# Patient Record
Sex: MALE | Race: WHITE | NOT HISPANIC OR LATINO | Employment: FULL TIME | URBAN - METROPOLITAN AREA
[De-identification: names, ages, dates, MRNs, and addresses within clinical notes are randomized per-mention and may not be internally consistent; named-entity substitution may affect disease eponyms.]

---

## 2017-06-15 ENCOUNTER — GENERIC CONVERSION - ENCOUNTER (OUTPATIENT)
Dept: OTHER | Facility: OTHER | Age: 57
End: 2017-06-15

## 2017-07-21 ENCOUNTER — TRANSCRIBE ORDERS (OUTPATIENT)
Dept: LAB | Facility: CLINIC | Age: 57
End: 2017-07-21

## 2017-07-21 ENCOUNTER — APPOINTMENT (OUTPATIENT)
Dept: LAB | Facility: CLINIC | Age: 57
End: 2017-07-21
Payer: COMMERCIAL

## 2017-07-21 ENCOUNTER — GENERIC CONVERSION - ENCOUNTER (OUTPATIENT)
Dept: OTHER | Facility: OTHER | Age: 57
End: 2017-07-21

## 2017-07-21 ENCOUNTER — ALLSCRIPTS OFFICE VISIT (OUTPATIENT)
Dept: OTHER | Facility: OTHER | Age: 57
End: 2017-07-21

## 2017-07-21 DIAGNOSIS — E66.01 MORBID (SEVERE) OBESITY DUE TO EXCESS CALORIES (HCC): ICD-10-CM

## 2017-07-21 DIAGNOSIS — I10 ESSENTIAL (PRIMARY) HYPERTENSION: ICD-10-CM

## 2017-07-21 DIAGNOSIS — R73.9 HYPERGLYCEMIA: ICD-10-CM

## 2017-07-21 LAB
ALBUMIN SERPL BCP-MCNC: 3.7 G/DL (ref 3.5–5)
ALP SERPL-CCNC: 67 U/L (ref 46–116)
ALT SERPL W P-5'-P-CCNC: 29 U/L (ref 12–78)
ANION GAP SERPL CALCULATED.3IONS-SCNC: 4 MMOL/L (ref 4–13)
AST SERPL W P-5'-P-CCNC: 12 U/L (ref 5–45)
BASOPHILS # BLD AUTO: 0.02 THOUSANDS/ΜL (ref 0–0.1)
BASOPHILS NFR BLD AUTO: 0 % (ref 0–1)
BILIRUB SERPL-MCNC: 1.32 MG/DL (ref 0.2–1)
BUN SERPL-MCNC: 15 MG/DL (ref 5–25)
CALCIUM SERPL-MCNC: 9.4 MG/DL (ref 8.3–10.1)
CHLORIDE SERPL-SCNC: 100 MMOL/L (ref 100–108)
CHOLEST SERPL-MCNC: 215 MG/DL (ref 50–200)
CO2 SERPL-SCNC: 34 MMOL/L (ref 21–32)
CREAT SERPL-MCNC: 0.93 MG/DL (ref 0.6–1.3)
EOSINOPHIL # BLD AUTO: 0.35 THOUSAND/ΜL (ref 0–0.61)
EOSINOPHIL NFR BLD AUTO: 4 % (ref 0–6)
ERYTHROCYTE [DISTWIDTH] IN BLOOD BY AUTOMATED COUNT: 13.2 % (ref 11.6–15.1)
EST. AVERAGE GLUCOSE BLD GHB EST-MCNC: 160 MG/DL
GFR SERPL CREATININE-BSD FRML MDRD: >60 ML/MIN/1.73SQ M
GLUCOSE P FAST SERPL-MCNC: 160 MG/DL (ref 65–99)
HBA1C MFR BLD: 7.2 % (ref 4.2–6.3)
HCT VFR BLD AUTO: 45.7 % (ref 36.5–49.3)
HDLC SERPL-MCNC: 31 MG/DL (ref 40–60)
HGB BLD-MCNC: 15.5 G/DL (ref 12–17)
LDLC SERPL CALC-MCNC: 138 MG/DL (ref 0–100)
LYMPHOCYTES # BLD AUTO: 2.02 THOUSANDS/ΜL (ref 0.6–4.47)
LYMPHOCYTES NFR BLD AUTO: 23 % (ref 14–44)
MCH RBC QN AUTO: 30.5 PG (ref 26.8–34.3)
MCHC RBC AUTO-ENTMCNC: 33.9 G/DL (ref 31.4–37.4)
MCV RBC AUTO: 90 FL (ref 82–98)
MONOCYTES # BLD AUTO: 0.39 THOUSAND/ΜL (ref 0.17–1.22)
MONOCYTES NFR BLD AUTO: 5 % (ref 4–12)
NEUTROPHILS # BLD AUTO: 5.92 THOUSANDS/ΜL (ref 1.85–7.62)
NEUTS SEG NFR BLD AUTO: 68 % (ref 43–75)
NRBC BLD AUTO-RTO: 0 /100 WBCS
PLATELET # BLD AUTO: 199 THOUSANDS/UL (ref 149–390)
PMV BLD AUTO: 11.8 FL (ref 8.9–12.7)
POTASSIUM SERPL-SCNC: 4.8 MMOL/L (ref 3.5–5.3)
PROT SERPL-MCNC: 7.3 G/DL (ref 6.4–8.2)
RBC # BLD AUTO: 5.09 MILLION/UL (ref 3.88–5.62)
SODIUM SERPL-SCNC: 138 MMOL/L (ref 136–145)
TRIGL SERPL-MCNC: 230 MG/DL
TSH SERPL DL<=0.05 MIU/L-ACNC: 1.81 UIU/ML (ref 0.36–3.74)
WBC # BLD AUTO: 8.75 THOUSAND/UL (ref 4.31–10.16)

## 2017-07-21 PROCEDURE — 84443 ASSAY THYROID STIM HORMONE: CPT

## 2017-07-21 PROCEDURE — 85025 COMPLETE CBC W/AUTO DIFF WBC: CPT

## 2017-07-21 PROCEDURE — 80061 LIPID PANEL: CPT

## 2017-07-21 PROCEDURE — 36415 COLL VENOUS BLD VENIPUNCTURE: CPT

## 2017-07-21 PROCEDURE — 80053 COMPREHEN METABOLIC PANEL: CPT

## 2017-07-21 PROCEDURE — 83036 HEMOGLOBIN GLYCOSYLATED A1C: CPT

## 2017-11-29 ENCOUNTER — ALLSCRIPTS OFFICE VISIT (OUTPATIENT)
Dept: OTHER | Facility: OTHER | Age: 57
End: 2017-11-29

## 2017-12-06 ENCOUNTER — ALLSCRIPTS OFFICE VISIT (OUTPATIENT)
Dept: OTHER | Facility: OTHER | Age: 57
End: 2017-12-06

## 2017-12-06 DIAGNOSIS — I10 ESSENTIAL (PRIMARY) HYPERTENSION: ICD-10-CM

## 2017-12-06 DIAGNOSIS — R73.9 HYPERGLYCEMIA: ICD-10-CM

## 2017-12-07 NOTE — PROGRESS NOTES
Assessment    1  Current every day smoker (305 1) (F17 200)   · FEW CIG /DAILY   2  Hypertension (401 9) (I10)   · 10/05   3  Obesity, morbid, BMI 40 0-49 9 (278 01) (E66 01)   4  Blood glucose elevated (790 29) (R73 9)   5  Dry skin (701 1) (L85 3)   6  Abnormal LFTs (790 6) (R79 89)    Plan  Blood glucose elevated    · (1) COMPREHENSIVE METABOLIC PANEL; Status:Active; Requested for:01Gwp4509;    · (1) HEMOGLOBIN A1C; Status:Active; Requested for:97Rcb6489;   Dry skin    · Ammonium Lactate 12 % External Cream; APPLY  AND RUB  IN A THIN FILM TOAFFECTED AREAS TWICE DAILY  (AM AND PM)  Flu vaccine need    · Stop: Fluzone Quadrivalent 0 5 ML Intramuscular Suspension PrefilledSyringe  Hypertension    · (1) LIPID PANEL, FASTING; Status:Active; Requested for:83Kwf3882;    · (1) TSH WITH FT4 REFLEX; Status:Active; Requested for:41Ydf9193;   SocHx: Current every day smoker    · You need to quit smoking ; Status:Complete;   Done: 43SDB5522    Discussion/Summary    Patient presents today for follow-up for several issues  Hypertension is much improved  He also has no further lower extremity swelling  He has done an excellent job and continues to lose weight  I would like her to have some labs redone to check up on his history of elevated cholesterol as well as elevated glucose  and anxiety have improved significantly  Continue with Lexapro daily and follow up with his   he does have 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  I prescribed him some Lac-Hydrin to apply as needed to his arms  He does continue to smoke a few cigarettes daily and I encouraged him to quit smoking as soon as possible  Chief Complaint  2M Follow up - HTNFlu shot      History of Present Illness  Patient presents today for follow-up for his chronic health issues  He has done quite well since his last visit  Depression anxiety much improved Lexapro   He does continue to speak routinely with his  which has been quite helpful as well  He denies any suicidal or homicidal ideation  has history of elevated glucose and is morbidly obese  He denies polyuria or polydipsia  his lower extremity swelling has improved with significant weight loss  He denies any current problems chest pain, shortness of breath or palpitations  Hypertension has been well controlled have dryness on bilateral arms  He notes that he has to utilize some very strong agents to get oil off of his arms from work sometimes which he thinks cause the problem  Review of Systems   Constitutional: recent weight loss, but-- no fever,-- not feeling poorly,-- no recent weight gain-- and-- not feeling tired  Cardiovascular: no chest pain-- and-- no palpitations  Respiratory: no shortness of breath,-- no cough,-- no wheezing-- and-- no shortness of breath during exertion  Gastrointestinal: no abdominal pain-- and-- no constipation  Genitourinary: no dysuria  Musculoskeletal: no arthralgias-- and-- no myalgias  Integumentary: dry skin, but-- no rashes--   The patient presents with complaints of no skin lesions  Neurological: no headache  Psychiatric: anxiety,-- depression-- and-- much improved, but-- not suicidal-- and-- no sleep disturbances  Endocrine: no muscle weakness  Hematologic/Lymphatic: no tendency for easy bleeding  Active Problems  1  Abnormal LFTs (790 6) (R79 89)   2  Basal cell carcinoma of nose (173 31) (C44 311)   3  Blood glucose elevated (790 29) (R73 9)   4  Carpal tunnel syndrome of right wrist (354 0) (G56 01)   5  Depression with anxiety (300 4) (F41 8)   6  Flu vaccine need (V04 81) (Z23)   7  Hypertension (401 9) (I10)   8  Obesity, morbid, BMI 40 0-49 9 (278 01) (E66 01)   9  Special screening examination for neoplasm of prostate (V76 44) (Z12 5)   10  Vasovagal syncope (780 2) (R55)    Past Medical History  1  History of Anxiety (300 00) (F41 9)   2  History of Hemangioma of liver (228 04) (D18 03)   3   History of depression (V11 8) (Z86 59) 4  History of hyperlipidemia (V12 29) (Z86 39)   5  History of Polyp of sigmoid colon (211 3) (D12 5)    Surgical History    1  History of Cholecystectomy Laparoscopic   2  History of Hernia Repair   3  History of Transcatheter Embolization For Tumor Destruction    Family History  Mother    1  Family history of Depression  Maternal Uncle    2  Family history of Hodgkin Disease    Social History     · Current every day smoker (305 1) (F17 200)   · Never Drank Alcohol    Current Meds   1  Escitalopram Oxalate 20 MG Oral Tablet; TAKE 1 TABLET DAILY; Therapy: 22FKH9289 to  Requested for: 03TPX4757 Recorded   2  HydroCHLOROthiazide 25 MG Oral Tablet; take 1 tablet by mouth every day; Therapy: 99PXR1904 to (Evaluate:28Jun2018)  Requested for: 44FPG5570; Last Rx:98Goz0338 Ordered   3  LORazepam 0 5 MG Oral Tablet; Take 1 tablet 3 times daily as needed; Therapy: 71OSX2493 to (Evaluate:10Aug2017); Last Rx:00Xqh5325 Ordered    Allergies  1  No Known Drug Allergies    Vitals  Vital Signs    Recorded: 18OTY7824 11:44AM   Heart Rate 68   Respiration 18   Systolic 935   Diastolic 78   Height 6 ft 2 in   Weight 348 lb    BMI Calculated 44 68   BSA Calculated 2 75   O2 Saturation 95, RA       Physical Exam   Constitutional  General appearance: Abnormal   morbidly obese  Eyes  Conjunctiva and lids: No swelling, erythema, or discharge  Pupils and irises: Equal, round and reactive to light  Ears, Nose, Mouth, and Throat  External inspection of ears and nose: Normal    Otoscopic examination: Tympanic membrance translucent with normal light reflex  Canals patent without erythema  Oropharynx: Normal with no erythema, edema, exudate or lesions  Pulmonary  Respiratory effort: No increased work of breathing or signs of respiratory distress  Auscultation of lungs: Clear to auscultation, equal breath sounds bilaterally, no wheezes, no rales, no rhonci     Cardiovascular  Auscultation of heart: Normal rate and rhythm, normal S1 and S2, without murmurs  Examination of extremities for edema and/or varicosities: Abnormal   bilateral ankle trace+ pitting edema  Carotid pulses: Normal    Abdomen  Abdomen: Abnormal   The abdomen was obese  Lymphatic  Palpation of lymph nodes in neck: No lymphadenopathy  Musculoskeletal  Gait and station: Normal    Skin  Skin and subcutaneous tissue: Abnormal  -- some stasis changes b/l LE    Psychiatric  Orientation to person, place and time: Normal    Mood and affect: Normal          Signatures   Electronically signed by : MICHAEL Hinton ; Dec  6 2017 12:15PM EST                       (Author)

## 2018-01-13 VITALS
SYSTOLIC BLOOD PRESSURE: 138 MMHG | HEIGHT: 74 IN | HEART RATE: 85 BPM | DIASTOLIC BLOOD PRESSURE: 90 MMHG | RESPIRATION RATE: 19 BRPM | BODY MASS INDEX: 40.43 KG/M2 | WEIGHT: 315 LBS

## 2018-01-15 NOTE — MISCELLANEOUS
Message  Return to work or school:   Wesley Vitale is under my professional care  He was seen in my office on 11 29 17   He is able to return to work on  11 30 17      Please excuse from absence prior to being seen by physician for acute illness     Piotr Yousif MD       Future Appointments    Signatures   Electronically signed by : Piotr Yousif MD; Nov 29 2017  3:10PM EST                       (Author)    Electronically signed by : Piotr Yousif MD; Nov 29 2017  3:11PM EST                       (Author)

## 2018-01-16 NOTE — RESULT NOTES
Verified Results  (1) HEMOGLOBIN A1C 72Bpg8881 10:10AM Aisha Eisenmenger Order Number: [id number]     Test Name Result Flag Reference   HEMOGLOBIN A1C 7 2 % H 4 2-6 3   EST  AVG  GLUCOSE 160 mg/dl       (1) COMPREHENSIVE METABOLIC PANEL 14CEO9388 17:55QQ Aisha Eisenmenger Order Number: [id number]     Test Name Result Flag Reference   SODIUM 138 mmol/L  136-145   POTASSIUM 4 8 mmol/L  3 5-5 3   CHLORIDE 100 mmol/L  100-108   CARBON DIOXIDE 34 mmol/L H 21-32   ANION GAP (CALC) 4 mmol/L  4-13   BLOOD UREA NITROGEN 15 mg/dL  5-25   CREATININE 0 93 mg/dL  0 60-1 30   Standardized to IDMS reference method   CALCIUM 9 4 mg/dL  8 3-10 1   BILI, TOTAL 1 32 mg/dL H 0 20-1 00   ALK PHOSPHATAS 67 U/L     ALT (SGPT) 29 U/L  12-78   AST(SGOT) 12 U/L  5-45   ALBUMIN 3 7 g/dL  3 5-5 0   TOTAL PROTEIN 7 3 g/dL  6 4-8 2   eGFR Non-African American      >60 0 ml/min/1 73sq m   [facility] Disease Education Program recommendations are as follows:  GFR calculation is accurate only with a steady state creatinine  Chronic Kidney disease less than 60 ml/min/1 73 sq  meters  Kidney failure less than 15 ml/min/1 73 sq  meters  GLUCOSE FASTING 160 mg/dL H 65-99     (1) LIPID PANEL, FASTING 21Jul2017 10:10AM Aisha Eisenmenger Order Number: [id number]     Test Name Result Flag Reference   CHOLESTEROL 215 mg/dL H    HDL,DIRECT 31 mg/dL L 40-60   Specimen collection should occur prior to Metamizole administration due to the potential for falsely depressed results     LDL CHOLESTEROL CALCULATED 138 mg/dL H 0-100   Triglyceride:         Normal              <150 mg/dl       Borderline High    150-199 mg/dl       High               200-499 mg/dl       Very High          >499 mg/dl  Cholesterol:         Desirable        <200 mg/dl      Borderline High  200-239 mg/dl      High             >239 mg/dl  HDL Cholesterol:        High    >59 mg/dL      Low     <41 mg/dL  LDL CALCULATED:    This screening LDL is a calculated result  It does not have the accuracy of the Direct Measured LDL in the monitoring of patients with hyperlipidemia and/or statin therapy  Direct Measure LDL (MLN350) must be ordered separately in these patients  TRIGLYCERIDES 230 mg/dL H <=150   Specimen collection should occur prior to N-Acetylcysteine or Metamizole administration due to the potential for falsely depressed results  (1) TSH WITH FT4 REFLEX 21Jul2017 10:10AM Felicita Jose Emike Order Number: [id number]     Test Name Result Flag Reference   TSH 1 810 uIU/mL  0 358-3 740   Patients undergoing fluorescein dye angiography may retain small amounts of fluorescein in the body for 48-72 hours post procedure  Samples containing fluorescein can produce falsely depressed TSH values  If the patient had this procedure,a specimen should be resubmitted post fluorescein clearance  (1) CBC/PLT/DIFF 21Jul2017 10:10AM Felicita Loaiza Order Number: [id number]     Test Name Result Flag Reference   WBC COUNT 8 75 Thousand/uL  4 31-10 16   RBC COUNT 5 09 Million/uL  3 88-5 62   HEMOGLOBIN 15 5 g/dL  12 0-17 0   HEMATOCRIT 45 7 %  36 5-49 3   MCV 90 fL  82-98   MCH 30 5 pg  26 8-34 3   MCHC 33 9 g/dL  31 4-37 4   RDW 13 2 %  11 6-15 1   MPV 11 8 fL  8 9-12 7   PLATELET COUNT 270 Thousands/uL  149-390   nRBC AUTOMATED 0 /100 WBCs     NEUTROPHILS RELATIVE PERCENT 68 %  43-75   LYMPHOCYTES RELATIVE PERCENT 23 %  14-44   MONOCYTES RELATIVE PERCENT 5 %  4-12   EOSINOPHILS RELATIVE PERCENT 4 %  0-6   BASOPHILS RELATIVE PERCENT 0 %  0-1   NEUTROPHILS ABSOLUTE COUNT 5 92 Thousands/? ??L  1 85-7 62   LYMPHOCYTES ABSOLUTE COUNT 2 02 Thousands/? ??L  0 60-4 47   MONOCYTES ABSOLUTE COUNT 0 39 Thousand/? ??L  0 17-1 22   EOSINOPHILS ABSOLUTE COUNT 0 35 Thousand/? ??L  0 00-0 61   BASOPHILS ABSOLUTE COUNT 0 02 Thousands/? ??L  0 00-0 10

## 2018-01-22 VITALS
WEIGHT: 315 LBS | HEIGHT: 74 IN | SYSTOLIC BLOOD PRESSURE: 136 MMHG | RESPIRATION RATE: 20 BRPM | TEMPERATURE: 97.7 F | BODY MASS INDEX: 40.43 KG/M2 | DIASTOLIC BLOOD PRESSURE: 78 MMHG | HEART RATE: 90 BPM

## 2018-01-23 VITALS
RESPIRATION RATE: 18 BRPM | DIASTOLIC BLOOD PRESSURE: 78 MMHG | OXYGEN SATURATION: 95 % | SYSTOLIC BLOOD PRESSURE: 120 MMHG | BODY MASS INDEX: 40.43 KG/M2 | HEART RATE: 68 BPM | HEIGHT: 74 IN | WEIGHT: 315 LBS

## 2018-07-27 DIAGNOSIS — I10 ESSENTIAL HYPERTENSION: Primary | ICD-10-CM

## 2018-07-31 RX ORDER — HYDROCHLOROTHIAZIDE 25 MG/1
TABLET ORAL
Qty: 90 TABLET | Refills: 3 | Status: SHIPPED | OUTPATIENT
Start: 2018-07-31 | End: 2019-10-08 | Stop reason: SDUPTHER

## 2019-10-08 DIAGNOSIS — I10 ESSENTIAL HYPERTENSION: ICD-10-CM

## 2019-10-09 RX ORDER — HYDROCHLOROTHIAZIDE 25 MG/1
TABLET ORAL
Qty: 90 TABLET | Refills: 3 | Status: SHIPPED | OUTPATIENT
Start: 2019-10-09 | End: 2020-05-18 | Stop reason: SDUPTHER

## 2020-01-20 ENCOUNTER — APPOINTMENT (OUTPATIENT)
Dept: LAB | Facility: CLINIC | Age: 60
End: 2020-01-20
Payer: COMMERCIAL

## 2020-01-20 ENCOUNTER — OFFICE VISIT (OUTPATIENT)
Dept: FAMILY MEDICINE CLINIC | Facility: CLINIC | Age: 60
End: 2020-01-20
Payer: COMMERCIAL

## 2020-01-20 VITALS
HEART RATE: 98 BPM | BODY MASS INDEX: 40.43 KG/M2 | WEIGHT: 315 LBS | TEMPERATURE: 100.4 F | HEIGHT: 74 IN | SYSTOLIC BLOOD PRESSURE: 110 MMHG | OXYGEN SATURATION: 94 % | DIASTOLIC BLOOD PRESSURE: 80 MMHG

## 2020-01-20 DIAGNOSIS — I10 ESSENTIAL HYPERTENSION: ICD-10-CM

## 2020-01-20 DIAGNOSIS — Z11.4 SCREENING FOR HIV (HUMAN IMMUNODEFICIENCY VIRUS): ICD-10-CM

## 2020-01-20 DIAGNOSIS — R79.89 ABNORMAL LFTS: ICD-10-CM

## 2020-01-20 DIAGNOSIS — R73.9 BLOOD GLUCOSE ELEVATED: ICD-10-CM

## 2020-01-20 DIAGNOSIS — E66.01 OBESITY, MORBID, BMI 40.0-49.9 (HCC): Primary | ICD-10-CM

## 2020-01-20 DIAGNOSIS — K52.9 GASTROENTERITIS: ICD-10-CM

## 2020-01-20 DIAGNOSIS — F33.1 MODERATE EPISODE OF RECURRENT MAJOR DEPRESSIVE DISORDER (HCC): ICD-10-CM

## 2020-01-20 DIAGNOSIS — E66.01 OBESITY, MORBID, BMI 40.0-49.9 (HCC): ICD-10-CM

## 2020-01-20 DIAGNOSIS — E66.01 OBESITY, CLASS III, BMI 40-49.9 (MORBID OBESITY) (HCC): ICD-10-CM

## 2020-01-20 PROBLEM — F41.8 DEPRESSION WITH ANXIETY: Status: ACTIVE | Noted: 2017-07-21

## 2020-01-20 PROBLEM — G56.01 CARPAL TUNNEL SYNDROME OF RIGHT WRIST: Status: ACTIVE | Noted: 2017-07-21

## 2020-01-20 LAB
ALBUMIN SERPL BCP-MCNC: 3.5 G/DL (ref 3.5–5)
ALP SERPL-CCNC: 61 U/L (ref 46–116)
ALT SERPL W P-5'-P-CCNC: 27 U/L (ref 12–78)
ANION GAP SERPL CALCULATED.3IONS-SCNC: 5 MMOL/L (ref 4–13)
AST SERPL W P-5'-P-CCNC: 9 U/L (ref 5–45)
BASOPHILS # BLD AUTO: 0.03 THOUSANDS/ΜL (ref 0–0.1)
BASOPHILS NFR BLD AUTO: 0 % (ref 0–1)
BILIRUB SERPL-MCNC: 2.03 MG/DL (ref 0.2–1)
BUN SERPL-MCNC: 14 MG/DL (ref 5–25)
CALCIUM SERPL-MCNC: 9.2 MG/DL (ref 8.3–10.1)
CHLORIDE SERPL-SCNC: 102 MMOL/L (ref 100–108)
CHOLEST SERPL-MCNC: 198 MG/DL (ref 50–200)
CO2 SERPL-SCNC: 32 MMOL/L (ref 21–32)
CREAT SERPL-MCNC: 1.06 MG/DL (ref 0.6–1.3)
CREAT UR-MCNC: 196 MG/DL
EOSINOPHIL # BLD AUTO: 0.11 THOUSAND/ΜL (ref 0–0.61)
EOSINOPHIL NFR BLD AUTO: 1 % (ref 0–6)
ERYTHROCYTE [DISTWIDTH] IN BLOOD BY AUTOMATED COUNT: 13.4 % (ref 11.6–15.1)
EST. AVERAGE GLUCOSE BLD GHB EST-MCNC: 200 MG/DL
GFR SERPL CREATININE-BSD FRML MDRD: 76 ML/MIN/1.73SQ M
GLUCOSE P FAST SERPL-MCNC: 208 MG/DL (ref 65–99)
HBA1C MFR BLD: 8.6 % (ref 4.2–6.3)
HCT VFR BLD AUTO: 46.9 % (ref 36.5–49.3)
HDLC SERPL-MCNC: 39 MG/DL
HGB BLD-MCNC: 15.3 G/DL (ref 12–17)
IMM GRANULOCYTES # BLD AUTO: 0.05 THOUSAND/UL (ref 0–0.2)
IMM GRANULOCYTES NFR BLD AUTO: 0 % (ref 0–2)
LDLC SERPL CALC-MCNC: 116 MG/DL (ref 0–100)
LYMPHOCYTES # BLD AUTO: 4.95 THOUSANDS/ΜL (ref 0.6–4.47)
LYMPHOCYTES NFR BLD AUTO: 32 % (ref 14–44)
MCH RBC QN AUTO: 30.2 PG (ref 26.8–34.3)
MCHC RBC AUTO-ENTMCNC: 32.6 G/DL (ref 31.4–37.4)
MCV RBC AUTO: 93 FL (ref 82–98)
MICROALBUMIN UR-MCNC: 25.1 MG/L (ref 0–20)
MICROALBUMIN/CREAT 24H UR: 13 MG/G CREATININE (ref 0–30)
MONOCYTES # BLD AUTO: 0.44 THOUSAND/ΜL (ref 0.17–1.22)
MONOCYTES NFR BLD AUTO: 3 % (ref 4–12)
NEUTROPHILS # BLD AUTO: 9.74 THOUSANDS/ΜL (ref 1.85–7.62)
NEUTS SEG NFR BLD AUTO: 64 % (ref 43–75)
NRBC BLD AUTO-RTO: 0 /100 WBCS
PLATELET # BLD AUTO: 228 THOUSANDS/UL (ref 149–390)
PMV BLD AUTO: 11.3 FL (ref 8.9–12.7)
POTASSIUM SERPL-SCNC: 4.1 MMOL/L (ref 3.5–5.3)
PROT SERPL-MCNC: 7.3 G/DL (ref 6.4–8.2)
RBC # BLD AUTO: 5.07 MILLION/UL (ref 3.88–5.62)
SODIUM SERPL-SCNC: 139 MMOL/L (ref 136–145)
TRIGL SERPL-MCNC: 217 MG/DL
WBC # BLD AUTO: 15.32 THOUSAND/UL (ref 4.31–10.16)

## 2020-01-20 PROCEDURE — 80053 COMPREHEN METABOLIC PANEL: CPT

## 2020-01-20 PROCEDURE — 99214 OFFICE O/P EST MOD 30 MIN: CPT | Performed by: FAMILY MEDICINE

## 2020-01-20 PROCEDURE — 82043 UR ALBUMIN QUANTITATIVE: CPT | Performed by: FAMILY MEDICINE

## 2020-01-20 PROCEDURE — 3008F BODY MASS INDEX DOCD: CPT | Performed by: FAMILY MEDICINE

## 2020-01-20 PROCEDURE — 3061F NEG MICROALBUMINURIA REV: CPT | Performed by: INTERNAL MEDICINE

## 2020-01-20 PROCEDURE — 80061 LIPID PANEL: CPT

## 2020-01-20 PROCEDURE — 82570 ASSAY OF URINE CREATININE: CPT | Performed by: FAMILY MEDICINE

## 2020-01-20 PROCEDURE — 3061F NEG MICROALBUMINURIA REV: CPT | Performed by: FAMILY MEDICINE

## 2020-01-20 PROCEDURE — 85025 COMPLETE CBC W/AUTO DIFF WBC: CPT

## 2020-01-20 PROCEDURE — 83036 HEMOGLOBIN GLYCOSYLATED A1C: CPT

## 2020-01-20 PROCEDURE — 3074F SYST BP LT 130 MM HG: CPT | Performed by: FAMILY MEDICINE

## 2020-01-20 PROCEDURE — 87389 HIV-1 AG W/HIV-1&-2 AB AG IA: CPT

## 2020-01-20 PROCEDURE — 3079F DIAST BP 80-89 MM HG: CPT | Performed by: FAMILY MEDICINE

## 2020-01-20 PROCEDURE — 36415 COLL VENOUS BLD VENIPUNCTURE: CPT

## 2020-01-20 RX ORDER — BUPROPION HYDROCHLORIDE 150 MG/1
150 TABLET ORAL EVERY MORNING
Qty: 30 TABLET | Refills: 5 | Status: SHIPPED | OUTPATIENT
Start: 2020-01-20 | End: 2020-07-14

## 2020-01-20 NOTE — LETTER
January 20, 2020     Patient: Madi Rees   YOB: 1960   Date of Visit: 1/20/2020       To Whom it May Concern:    Patricia Stinson is under my professional care  He was seen in my office on 1/20/2020  He may return to work on 1/22/2020  Please excuse his absences on 1/20/20 and 1/21/20  If you have any questions or concerns, please don't hesitate to call           Sincerely,          Prem Grullon MD        CC: No Recipients

## 2020-01-20 NOTE — PROGRESS NOTES
Assessment/Plan:       Problem List Items Addressed This Visit        Cardiovascular and Mediastinum    Hypertension    Relevant Orders    CBC and differential    Comprehensive metabolic panel       Other    Abnormal LFTs    Relevant Orders    Comprehensive metabolic panel    Lipid Panel with Direct LDL reflex    Blood glucose elevated    Relevant Orders    Hemoglobin A1C    Microalbumin / creatinine urine ratio    Moderate episode of recurrent major depressive disorder (HCC)    Relevant Medications    buPROPion (WELLBUTRIN XL) 150 mg 24 hr tablet    Other Relevant Orders    CBC and differential    Obesity, morbid, BMI 40 0-49 9 ([facility] [address] ) - Primary    Relevant Orders    CBC and differential    Comprehensive metabolic panel    Lipid Panel with Direct LDL reflex      Other Visit Diagnoses     Obesity, Class III, BMI 40-49 9 (morbid obesity) ([facility] [address] )        Gastroenteritis        This seems to be resolving  Monitor closely  Work note provided  Screening for HIV (human immunodeficiency virus)        Relevant Orders    [facility] HIV 1/2 AG-AB combo            SubjectivBMI Counseling: Body mass index is 44 81 kg/m²  The BMI is above normal  Nutrition recommendations include encouraging healthy choices of fruits and vegetables  Exercise recommendations include exercising 3-5 times per week  Depression Screening and Follow-up Plan: Patient's depression screening was positive with a PHQ-2 score of 4  Their PHQ-9 score was 12  Patient assessed for underlying major depression  Brief counseling provided and recommend additional follow-up/re-evaluation next office visit  Tobacco Cessation Counseling: Tobacco cessation counseling was provided  The patient is sincerely urged to quit consumption of tobacco  He is not ready to quit tobacco  Medication options and side effects of medication discussed  Patient refused medication  e:      Patient ID: Emma Martínez is a 61 y o  male      HPI patient presents today complaining of having an episode of vomiting last night after feeling extremely nauseous  He notes he did eat very spicy chili which may have contributed  He also had multiple sick contacts at work with a gastroenteritis like illness  He had some significant abdominal cramping him pain and was not able to go to work today  He is still having a bit of cramping but feels he is improving  He denies any diarrhea or further nausea or vomiting  He is having no fever or chills  He does screen positive for depression  He admits to having some chronic depressed mood  He had done well in the past with Lexapro  He does continue to smoke about 4 to 5 cigarettes per day but would be interested in quitting  He has a history of elevated glucose as well as an elevated bilirubin  He admits he does not likely like coming to our office and has that have blood work done in over 2 years  The following portions of the patient's history were reviewed and updated as appropriate: allergies, current medications, past family history, past medical history, past social history, past surgical history and problem list       Current Outpatient Medications:     hydrochlorothiazide (HYDRODIURIL) 25 mg tablet, TAKE 1 TABLET BY MOUTH EVERY DAY, Disp: 90 tablet, Rfl: 3    buPROPion (WELLBUTRIN XL) 150 mg 24 hr tablet, Take 1 tablet (150 mg total) by mouth every morning, Disp: 30 tablet, Rfl: 5     Review of Systems   Constitutional: Negative for appetite change, chills, fatigue, fever and unexpected weight change  HENT: Negative for trouble swallowing  Eyes: Negative for visual disturbance  Respiratory: Negative for cough, chest tightness, shortness of breath and wheezing  Cardiovascular: Negative for chest pain  Gastrointestinal: Negative for abdominal distention, abdominal pain, blood in stool, constipation, diarrhea, nausea and vomiting  Endocrine: Negative for polyuria     Genitourinary: Negative for difficulty urinating and flank pain  Musculoskeletal: Negative for arthralgias, gait problem and myalgias  Skin: Negative for rash  Neurological: Negative for dizziness and light-headedness  Hematological: Negative for adenopathy  Does not bruise/bleed easily  Psychiatric/Behavioral: Positive for dysphoric mood  Negative for sleep disturbance and suicidal ideas  The patient is nervous/anxious  Objective:      /80 (BP Location: Left arm, Patient Position: Sitting, Cuff Size: Large)   Pulse 98   Temp 100 4 °F (38 °C) (Tympanic)   Ht 6' 2" (1 88 m)   Wt (!) 158 kg (349 lb)   SpO2 94%   BMI 44 81 kg/m²          Physical Exam   Constitutional: He is oriented to person, place, and time  He appears well-developed and well-nourished  No distress  obese   HENT:   Head: Normocephalic  Eyes: Pupils are equal, round, and reactive to light  Right eye exhibits no discharge  Left eye exhibits no discharge  Neck: No tracheal deviation present  No thyromegaly present  Cardiovascular: Normal rate, regular rhythm and normal heart sounds  No murmur heard  Pulmonary/Chest: Effort normal  No respiratory distress  He has no wheezes  He has no rales  Abdominal: Soft  He exhibits no distension  There is no hepatosplenomegaly, splenomegaly or hepatomegaly  There is no tenderness  There is no rigidity, no rebound, no guarding, no CVA tenderness, no tenderness at McBurney's point and negative Albert's sign  Musculoskeletal: Normal range of motion  He exhibits no edema  Lymphadenopathy:     He has no cervical adenopathy  Neurological: He is alert and oriented to person, place, and time  No cranial nerve deficit  Skin: Skin is warm  He is not diaphoretic  No erythema  Psychiatric: He has a normal mood and affect   Judgment and thought content normal          Mary Borges MD

## 2020-01-22 ENCOUNTER — TELEPHONE (OUTPATIENT)
Dept: FAMILY MEDICINE CLINIC | Facility: CLINIC | Age: 60
End: 2020-01-22

## 2020-01-22 LAB — HIV 1+2 AB+HIV1 P24 AG SERPL QL IA: NORMAL

## 2020-01-22 NOTE — TELEPHONE ENCOUNTER
Notes recorded by Evangelista Vazquez RN on 1/22/2020 at 3:16 PM EST  I discussed this at length with pt  Anurag Shah had no idea he has diabetes and was shocked  Thought it was a death sentence  Discussed diet and avoiding sugared beverages as he mentioned that he drinks a lot of sweetened ice tea    May be interested in Diabetes Ed  He is coming back in to see [place] as well     ------    Notes recorded by Chanel Guzman MA on 1/22/2020 at 9:01 AM EST  LM for pt to call back re: results  ------    Notes recorded by Jc Sebastian MD on 1/20/2020 at 10:18 PM EST  Call patient regarding test  Donavan Funez has poorly controlled diabetes   His white blood cell count is elevated likely due to his recent stomach issues   Please make sure he has a follow-up in our office in the near future

## 2020-01-23 NOTE — TELEPHONE ENCOUNTER
Okay to refer him for diabetic education, but he also needs to come back in for a follow-up visit  We did discuss his elevated glucose at his visit

## 2020-01-24 DIAGNOSIS — E11.9 TYPE 2 DIABETES MELLITUS WITHOUT COMPLICATION, WITHOUT LONG-TERM CURRENT USE OF INSULIN (HCC): Primary | ICD-10-CM

## 2020-01-24 DIAGNOSIS — R73.9 BLOOD GLUCOSE ELEVATED: Primary | ICD-10-CM

## 2020-01-24 NOTE — PROGRESS NOTES
Diabetic Education referral put back in with the appropriate diagnosis of newly diagnosed type 2 diabetes

## 2020-01-24 NOTE — TELEPHONE ENCOUNTER
Referral has been placed also left a message for pt to call the office to make another ov with Melissa Marx

## 2020-02-05 ENCOUNTER — CLINICAL SUPPORT (OUTPATIENT)
Dept: FAMILY MEDICINE CLINIC | Facility: CLINIC | Age: 60
End: 2020-02-05

## 2020-02-05 DIAGNOSIS — E11.9 TYPE 2 DIABETES MELLITUS WITHOUT COMPLICATION, WITHOUT LONG-TERM CURRENT USE OF INSULIN (HCC): Primary | ICD-10-CM

## 2020-02-05 NOTE — PROGRESS NOTES
Gabriel Johnson, [place]    Reason for visit: Appointment with 61y o  year old     ASSESSMENT/PLAN                                                                                     1  Type 2 diabetes: goal A1c <7% based on 2019 ADA guidelines  Most recent A1c above goal but patient was not on medication  Would benefit from diabetes education  Patient has made lifestyle modifications   Most recent labs and diabetes goals discussed with patient in clinic today   MEDICATIONS: Will defer starting metformin to PCP   SMBG: Will defer starting glucometer to PCP; if patient will start checking BG, consider pharm referral for glucometer education   TLCs: discussion with patient on benefits of reducing CHO intake and meeting with [address] [place] [address]; patient would be interested to learn more about dietician services but reports he is unable to drive to [facility] be willing to drive to [facility]  Will reach out to dietician to have patient get scheduled with [facility] diabetes education center   Discussion with patient on need for annual eye exam, patient voiced understanding    2  Medication Reconciliation: Medication list reviewed with pt at today's visit  No discrepancies  Follow-Up: with PCP on 2/7 as scheduled  May consider f/u appt if patient is started on rx  Patient gave verbal okay to talk with Daughter regarding outcome from PCP and clinical pharmacist appt; will call daughter next week to provide update        SUBJECTIVE                                                                                                              1  Medication Adherence: Medication list reviewed with patient, reports the following discrepancies/problems:   Bupropion: previously had issues sleeping when started rx but denies problems over the past few days    Did not schedule diabetes education as he reports he was told classes were only in [facility] [place] during his work hours and he would have difficulty getting to appts on time    3  Lifestyle:    Diet Recall - has tried to improve diet since talking to Girma De Paz, would previously eat a significant amount of CHO during the day      B - Food: hard boiled egg + KIND bar     L - Food: walnuts, steamed chicken or roasted pork loin, low-sugar dressing     D - Food: protein, broccoli, CHO     Daily Beverages: water, previously would have a lot of orange juice and large [employer] coffee with lots of sugar     Snacks: almonds, Kind bar     Exercise: plans on trying to walk      OBJECTIVE                                                                                                      Pertinent Lab Data:    Lab Results   Component Value Date    HGBA1C 8 6 (H) 01/20/2020     Demographics  Interaction Method: Face to Face  Type of Intervention: New    Topic(s) Addressed  Diabetes    Intervention(s) Made      Non-Pharmacologic: Other    Comments: Diabetes education classes, lifestyle    Tool(s) Used  Not Applicable    Time Spent in Direct Patient Care Activities and Care Coordination: 46-60 Minutes    Recommendation(s) Accepted by the Patient/Caregiver:  All Accepted

## 2020-02-07 ENCOUNTER — OFFICE VISIT (OUTPATIENT)
Dept: FAMILY MEDICINE CLINIC | Facility: CLINIC | Age: 60
End: 2020-02-07
Payer: COMMERCIAL

## 2020-02-07 VITALS
HEIGHT: 74 IN | RESPIRATION RATE: 18 BRPM | OXYGEN SATURATION: 97 % | DIASTOLIC BLOOD PRESSURE: 88 MMHG | BODY MASS INDEX: 40.43 KG/M2 | SYSTOLIC BLOOD PRESSURE: 120 MMHG | WEIGHT: 315 LBS | HEART RATE: 72 BPM

## 2020-02-07 DIAGNOSIS — Z11.59 NEED FOR HEPATITIS C SCREENING TEST: ICD-10-CM

## 2020-02-07 DIAGNOSIS — E66.01 OBESITY, MORBID, BMI 40.0-49.9 (HCC): ICD-10-CM

## 2020-02-07 DIAGNOSIS — E66.01 OBESITY, CLASS III, BMI 40-49.9 (MORBID OBESITY) (HCC): ICD-10-CM

## 2020-02-07 DIAGNOSIS — Z00.00 ANNUAL PHYSICAL EXAM: Primary | ICD-10-CM

## 2020-02-07 DIAGNOSIS — F33.1 MODERATE EPISODE OF RECURRENT MAJOR DEPRESSIVE DISORDER (HCC): ICD-10-CM

## 2020-02-07 DIAGNOSIS — Z86.010 HX OF COLONIC POLYPS: ICD-10-CM

## 2020-02-07 DIAGNOSIS — E11.9 TYPE 2 DIABETES MELLITUS WITHOUT COMPLICATION, WITHOUT LONG-TERM CURRENT USE OF INSULIN (HCC): ICD-10-CM

## 2020-02-07 DIAGNOSIS — IMO0001 UNCONTROLLED DIABETES MELLITUS TYPE 2 WITHOUT COMPLICATIONS: ICD-10-CM

## 2020-02-07 DIAGNOSIS — I10 ESSENTIAL HYPERTENSION: ICD-10-CM

## 2020-02-07 DIAGNOSIS — F41.1 GENERALIZED ANXIETY DISORDER: ICD-10-CM

## 2020-02-07 DIAGNOSIS — R79.89 ABNORMAL LFTS: ICD-10-CM

## 2020-02-07 DIAGNOSIS — Z23 NEED FOR TDAP VACCINATION: ICD-10-CM

## 2020-02-07 PROBLEM — R73.9 BLOOD GLUCOSE ELEVATED: Status: RESOLVED | Noted: 2017-07-21 | Resolved: 2020-02-07

## 2020-02-07 PROCEDURE — 99396 PREV VISIT EST AGE 40-64: CPT | Performed by: FAMILY MEDICINE

## 2020-02-07 PROCEDURE — 3079F DIAST BP 80-89 MM HG: CPT | Performed by: FAMILY MEDICINE

## 2020-02-07 PROCEDURE — 3008F BODY MASS INDEX DOCD: CPT | Performed by: INTERNAL MEDICINE

## 2020-02-07 PROCEDURE — 3052F HG A1C>EQUAL 8.0%<EQUAL 9.0%: CPT | Performed by: FAMILY MEDICINE

## 2020-02-07 PROCEDURE — 90715 TDAP VACCINE 7 YRS/> IM: CPT

## 2020-02-07 PROCEDURE — 90471 IMMUNIZATION ADMIN: CPT

## 2020-02-07 PROCEDURE — 3074F SYST BP LT 130 MM HG: CPT | Performed by: FAMILY MEDICINE

## 2020-02-07 RX ORDER — LORAZEPAM 0.5 MG/1
0.5 TABLET ORAL EVERY 8 HOURS PRN
Qty: 15 TABLET | Refills: 0 | Status: SHIPPED | OUTPATIENT
Start: 2020-02-07 | End: 2022-04-15 | Stop reason: SDUPTHER

## 2020-02-07 NOTE — PATIENT INSTRUCTIONS
10% - bad control"> 10% - bad control,Hemoglobin A1c (HbA1c) greater than 10% indicating poor diabetic control,Haemoglobin A1c greater than 10% indicating poor diabetic control">   Diabetes Mellitus Type 2 in Adults, Ambulatory Care   GENERAL INFORMATION:   Diabetes mellitus type 2  is a disease that affects how your body uses glucose (sugar)  Insulin helps move sugar out of the blood so it can be used for energy  Normally, when the blood sugar level increases, the pancreas makes more insulin  Type 2 diabetes develops because either the body cannot make enough insulin, or it cannot use the insulin correctly  After many years, your pancreas may stop making insulin  Common symptoms include the following:   · More hunger or thirst than usual     · Frequent urination     · Weight loss without trying     · Blurred vision  Seek immediate care for the following symptoms:   · Severe abdominal pain, or pain that spreads to your back  You may also be vomiting  · Trouble staying awake or focusing    · Shaking or sweating    · Blurred or double vision    · Breath has a fruity, sweet smell    · Breathing is deep and labored, or rapid and shallow    · Heartbeat is fast and weak  Treatment for diabetes mellitus type 2  includes keeping your blood sugar at a normal level  You must eat the right foods, and exercise regularly  You may also need medicine if you cannot control your blood sugar level with nutrition and exercise  Manage diabetes mellitus type 2:   · Check your blood sugar level  You will be taught how to check a small drop of blood in a glucose monitor  Ask your healthcare provider when and how often to check during the day  Ask your healthcare provider what your blood sugar levels should be when you check them  · Keep track of carbohydrates (sugar and starchy foods)  Your blood sugar level can get too high if you eat too many carbohydrates   Your dietitian will help you plan meals and snacks that have the right amount of carbohydrates  · Eat low-fat foods  Some examples are skinless chicken and low-fat milk  · Eat less sodium (salt)  Some examples of high-sodium foods to limit are soy sauce, potato chips, and soup  Do not add salt to food you cook  Limit your use of table salt  · Eat high-fiber foods  Foods that are a good source of fiber include vegetables, whole grain bread, and beans  · Limit alcohol  Alcohol affects your blood sugar level and can make it harder to manage your diabetes  Women should limit alcohol to 1 drink a day  Men should limit alcohol to 2 drinks a day  A drink of alcohol is 12 ounces of beer, 5 ounces of wine, or 1½ ounces of liquor  · Get regular exercise  Exercise can help keep your blood sugar level steady, decrease your risk of heart disease, and help you lose weight  Exercise for at least 30 minutes, 5 days a week  Include muscle strengthening activities 2 days each week  Work with your healthcare provider to create an exercise plan  · Check your feet each day  for injuries or open sores  Ask your healthcare provider for activities you can do if you have an open sore  · Quit smoking  If you smoke, it is never too late to quit  Smoking can worsen the problems that may occur with diabetes  Ask your healthcare provider for information about how to stop smoking if you are having trouble quitting  · Ask about your weight:  Ask healthcare providers if you need to lose weight, and how much to lose  Ask them to help you with a weight loss program  Even a 10 to 15 pound weight loss can help you manage your blood sugar level  · Carry medical alert identification  Wear medical alert jewelry or carry a card that says you have diabetes  Ask your healthcare provider where to get these items  · Ask about vaccines  Diabetes puts you at risk of serious illness if you get the flu, pneumonia, or hepatitis   Ask your healthcare provider if you should get a flu, pneumonia, or hepatitis B vaccine, and when to get the vaccine  Follow up with your healthcare provider as directed:  Write down your questions so you remember to ask them during your visits  CARE AGREEMENT:   You have the right to help plan your care  Learn about your health condition and how it may be treated  Discuss treatment options with your caregivers to decide what care you want to receive  You always have the right to refuse treatment  The above information is an  only  It is not intended as medical advice for individual conditions or treatments  Talk to your doctor, nurse or pharmacist before following any medical regimen to see if it is safe and effective for you  © 2014 [address] is for End User's use only and may not be sold, redistributed or otherwise used for commercial purposes  All illustrations and images included in CareNotes® are the copyrighted property of [employer]  or Jeremy Edai  Hypertension  Blood pressure is well controlled with hydrochlorothiazide  He should probably be on an Ace or an Arb, but since we are adding metformin at this time, I am going to hold off on that switch for now  Generalized anxiety disorder  PD MP checked  There are no red flags  I gave him lorazepam to take on just a rare basis  Abnormal LFTs  Repeat CMP in 3 months  Obesity, morbid, BMI 40 0-49 9 ([facility] [address] )  Continue to work on weight loss    He is planning on following up with diabetic education

## 2020-02-07 NOTE — PROGRESS NOTES
FAMILY PRACTICE OFFICE VISIT       NAME: Fern Mclaughlin  AGE: 61 y o  SEX: male       : 1960        MRN: [id number]    DATE: 2020  TIME: 12:54 PM    Assessment and Plan     Problem List Items Addressed This Visit        Endocrine    Type 2 diabetes mellitus without complication, without long-term current use of insulin ([facility] )       Lab Results   Component Value Date    HGBA1C 8 6 (H) 2020   He has already lost a significant amount of weight which will help  I would like him to try metformin 1000 mg once daily, which we can titrate up to 1000 mg twice daily as needed  Continue to work on weight loss  Undergo diabetic education which will be extremely valuable for him  Referral has already been placed  Follow-up here in 3-4 months with lab work prior to his visit  Relevant Medications    metFORMIN (GLUCOPHAGE) 1000 MG tablet       Cardiovascular and Mediastinum    Hypertension     Blood pressure is well controlled with hydrochlorothiazide  He should probably be on an Ace or an Arb, but since we are adding metformin at this time, I am going to hold off on that switch for now  Other    Abnormal LFTs     Repeat CMP in 3 months  Moderate episode of recurrent major depressive disorder (HCC)    Relevant Medications    LORazepam (ATIVAN) 0 5 mg tablet    Obesity, morbid, BMI 40 0-49 9 ([facility] )     Continue to work on weight loss  He is planning on following up with diabetic education         Generalized anxiety disorder     PD MP checked  There are no red flags  I gave him lorazepam to take on just a rare basis           Relevant Medications    LORazepam (ATIVAN) 0 5 mg tablet      Other Visit Diagnoses     Annual physical exam    -  Primary    Obesity, Class III, BMI 40-49 9 (morbid obesity) ([facility] )        Relevant Orders    Hemoglobin A1C    Comprehensive metabolic panel    Uncontrolled diabetes mellitus type 2 without complications (HCC)        Relevant Medications metFORMIN (GLUCOPHAGE) 1000 MG tablet    Other Relevant Orders    Ambulatory referral to Ophthalmology    Hemoglobin A1C    Comprehensive metabolic panel    Hx of colonic polyps        He has been referred to GI    Relevant Orders    Ambulatory referral to Gastroenterology    Need for hepatitis C screening test        Relevant Orders    Hepatitis C antibody    Need for Tdap vaccination        Relevant Orders    TDAP VACCINE GREATER THAN OR EQUAL TO 6YO IM          Hypertension  Blood pressure is well controlled with hydrochlorothiazide  He should probably be on an Ace or an Arb, but since we are adding metformin at this time, I am going to hold off on that switch for now  Generalized anxiety disorder  PD MP checked  There are no red flags  I gave him lorazepam to take on just a rare basis  Abnormal LFTs  Repeat CMP in 3 months  Obesity, morbid, BMI 40 0-49 9 ([facility] )  Continue to work on weight loss  He is planning on following up with diabetic education    Type 2 diabetes mellitus without complication, without long-term current use of insulin ([facility] )    Lab Results   Component Value Date    HGBA1C 8 6 (H) 01/20/2020   He has already lost a significant amount of weight which will help  I would like him to try metformin 1000 mg once daily, which we can titrate up to 1000 mg twice daily as needed  Continue to work on weight loss  Undergo diabetic education which will be extremely valuable for him  Referral has already been placed  Follow-up here in 3-4 months with lab work prior to his visit  Patient Instructions     10% - bad control"> 10% - bad control,Hemoglobin A1c (HbA1c) greater than 10% indicating poor diabetic control,Haemoglobin A1c greater than 10% indicating poor diabetic control">   Diabetes Mellitus Type 2 in Adults, Ambulatory Care   GENERAL INFORMATION:   Diabetes mellitus type 2  is a disease that affects how your body uses glucose (sugar)   Insulin helps move sugar out of the blood so it can be used for energy  Normally, when the blood sugar level increases, the pancreas makes more insulin  Type 2 diabetes develops because either the body cannot make enough insulin, or it cannot use the insulin correctly  After many years, your pancreas may stop making insulin  Common symptoms include the following:   · More hunger or thirst than usual     · Frequent urination     · Weight loss without trying     · Blurred vision  Seek immediate care for the following symptoms:   · Severe abdominal pain, or pain that spreads to your back  You may also be vomiting  · Trouble staying awake or focusing    · Shaking or sweating    · Blurred or double vision    · Breath has a fruity, sweet smell    · Breathing is deep and labored, or rapid and shallow    · Heartbeat is fast and weak  Treatment for diabetes mellitus type 2  includes keeping your blood sugar at a normal level  You must eat the right foods, and exercise regularly  You may also need medicine if you cannot control your blood sugar level with nutrition and exercise  Manage diabetes mellitus type 2:   · Check your blood sugar level  You will be taught how to check a small drop of blood in a glucose monitor  Ask your healthcare provider when and how often to check during the day  Ask your healthcare provider what your blood sugar levels should be when you check them  · Keep track of carbohydrates (sugar and starchy foods)  Your blood sugar level can get too high if you eat too many carbohydrates  Your dietitian will help you plan meals and snacks that have the right amount of carbohydrates  · Eat low-fat foods  Some examples are skinless chicken and low-fat milk  · Eat less sodium (salt)  Some examples of high-sodium foods to limit are soy sauce, potato chips, and soup  Do not add salt to food you cook  Limit your use of table salt  · Eat high-fiber foods    Foods that are a good source of fiber include vegetables, whole grain bread, and beans     · Limit alcohol  Alcohol affects your blood sugar level and can make it harder to manage your diabetes  Women should limit alcohol to 1 drink a day  Men should limit alcohol to 2 drinks a day  A drink of alcohol is 12 ounces of beer, 5 ounces of wine, or 1½ ounces of liquor  · Get regular exercise  Exercise can help keep your blood sugar level steady, decrease your risk of heart disease, and help you lose weight  Exercise for at least 30 minutes, 5 days a week  Include muscle strengthening activities 2 days each week  Work with your healthcare provider to create an exercise plan  · Check your feet each day  for injuries or open sores  Ask your healthcare provider for activities you can do if you have an open sore  · Quit smoking  If you smoke, it is never too late to quit  Smoking can worsen the problems that may occur with diabetes  Ask your healthcare provider for information about how to stop smoking if you are having trouble quitting  · Ask about your weight:  Ask healthcare providers if you need to lose weight, and how much to lose  Ask them to help you with a weight loss program  Even a 10 to 15 pound weight loss can help you manage your blood sugar level  · Carry medical alert identification  Wear medical alert jewelry or carry a card that says you have diabetes  Ask your healthcare provider where to get these items  · Ask about vaccines  Diabetes puts you at risk of serious illness if you get the flu, pneumonia, or hepatitis  Ask your healthcare provider if you should get a flu, pneumonia, or hepatitis B vaccine, and when to get the vaccine  Follow up with your healthcare provider as directed:  Write down your questions so you remember to ask them during your visits  CARE AGREEMENT:   You have the right to help plan your care  Learn about your health condition and how it may be treated   Discuss treatment options with your caregivers to decide what care you want to receive  You always have the right to refuse treatment  The above information is an  only  It is not intended as medical advice for individual conditions or treatments  Talk to your doctor, nurse or pharmacist before following any medical regimen to see if it is safe and effective for you  © 2014 [address] is for End User's use only and may not be sold, redistributed or otherwise used for commercial purposes  All illustrations and images included in CareNotes® are the copyrighted property of [employer]  or Jeremy Wilkinson  Hypertension  Blood pressure is well controlled with hydrochlorothiazide  He should probably be on an Ace or an Arb, but since we are adding metformin at this time, I am going to hold off on that switch for now  Generalized anxiety disorder  PD MP checked  There are no red flags  I gave him lorazepam to take on just a rare basis  Abnormal LFTs  Repeat CMP in 3 months  Obesity, morbid, BMI 40 0-49 9 ([facility] )  Continue to work on weight loss  He is planning on following up with diabetic education               Chief Complaint     Chief Complaint   Patient presents with    Annual Exam       History of Present Illness   Brando Whipple is a 61y o -year-old male who presents today for annual physical   He also has recently been diagnosed with diabetes  He is very upset about this  He notes he has been really watching his diet and almost fasting to a degree and has lost about 20 lb since his visit a few weeks ago  He denies polyuria or polydipsia  He does have a history of depression and feels that Wellbutrin, while at initially kept him awake, has now been helpful  He feels his depressed mood is improving  He does still have breakthrough anxiety on occasion which can be quite severe any request something to take just on occasion    He has history of hypertension and currently denies chest pain, shortness of breath, palpitations or lightheadedness  Review of Systems   Review of Systems   Constitutional: Negative for appetite change, chills, fatigue, fever and unexpected weight change  HENT: Negative for trouble swallowing  Eyes: Negative for visual disturbance  Respiratory: Negative for cough, chest tightness, shortness of breath and wheezing  Cardiovascular: Negative for chest pain  Gastrointestinal: Negative for abdominal distention, abdominal pain, blood in stool, constipation and diarrhea  Endocrine: Negative for polyuria  Genitourinary: Negative for difficulty urinating and flank pain  Musculoskeletal: Negative for arthralgias and myalgias  Skin: Negative for rash  Neurological: Negative for dizziness and light-headedness  Hematological: Negative for adenopathy  Does not bruise/bleed easily  Psychiatric/Behavioral: Negative for sleep disturbance         Active Problem List     Patient Active Problem List   Diagnosis    Abnormal LFTs    Basal cell carcinoma of nose    Carpal tunnel syndrome of right wrist    Moderate episode of recurrent major depressive disorder ([facility] )    Hypertension    Obesity, morbid, BMI 40 0-49 9 ([facility] )    Vasovagal syncope    Type 2 diabetes mellitus without complication, without long-term current use of insulin (HCC)    Generalized anxiety disorder         Past Medical History:  Past Medical History:   Diagnosis Date    Anxiety     last asessed [id number]    Depression     last assessed [id number]    Hemangioma of liver     Of liver - emnolized 2012;  last assessed [id number]    Hyperlipidemia     Polyp of sigmoid colon 09/01/2012    x 1  9/12       Past Surgical History:  Past Surgical History:   Procedure Laterality Date    CHOLECYSTECTOMY LAPAROSCOPIC      EMBOLIZATION  02/01/2012    Large Hemangioma, 2/12    VENTRAL HERNIA REPAIR      [facility]       Family History:  Family History   Problem Relation Age of Onset    Depression Mother     Hodgkin's lymphoma Maternal Uncle        Social History:  Social History     Socioeconomic History    Marital status: Single     Spouse name: Not on file    Number of children: Not on file    Years of education: Not on file    Highest education level: Not on file   Occupational History    Not on file   Social Needs    Financial resource strain: Not on file    Food insecurity:     Worry: Not on file     Inability: Not on file    Transportation needs:     Medical: Not on file     Non-medical: Not on file   Tobacco Use    Smoking status: Current Every Day Smoker     Packs/day: 0 50     Types: Cigarettes    Smokeless tobacco: Never Used    Tobacco comment: 8-10 cigs per day   Substance and Sexual Activity    Alcohol use: No    Drug use: Never    Sexual activity: Yes     Partners: Female   Lifestyle    Physical activity:     Days per week: Not on file     Minutes per session: Not on file    Stress: Not on file   Relationships    Social connections:     Talks on phone: Not on file     Gets together: Not on file     Attends Confucianism service: Not on file     Active member of club or organization: Not on file     Attends meetings of clubs or organizations: Not on file     Relationship status: Not on file    Intimate partner violence:     Fear of current or ex partner: Not on file     Emotionally abused: Not on file     Physically abused: Not on file     Forced sexual activity: Not on file   Other Topics Concern    Not on file   Social History Narrative    Not on file       Objective     Vitals:    02/07/20 1200   BP: 120/88   Pulse: 72   Resp: 18   SpO2: 97%     Wt Readings from Last 3 Encounters:   02/07/20 (!) 149 kg (328 lb)   01/20/20 (!) 158 kg (349 lb)   12/06/17 (!) 158 kg (347 lb 15 9 oz)       Physical Exam   Constitutional: He is oriented to person, place, and time  He appears well-developed and well-nourished  No distress  obese   HENT:   Head: Normocephalic  Eyes: Pupils are equal, round, and reactive to light  Right eye exhibits no discharge  Left eye exhibits no discharge  Neck: No tracheal deviation present  No thyromegaly present  Cardiovascular: Normal rate, regular rhythm and normal heart sounds  Exam reveals no gallop and no friction rub  Pulses are no weak pulses  No murmur heard  Pulses:       Dorsalis pedis pulses are 2+ on the right side, and 2+ on the left side  Posterior tibial pulses are 2+ on the right side, and 2+ on the left side  Pulmonary/Chest: Effort normal  No respiratory distress  He has no wheezes  He has no rales  Abdominal: Soft  He exhibits no distension  There is no tenderness  Musculoskeletal: Normal range of motion  He exhibits no edema  Feet:   Right Foot:   Skin Integrity: Negative for ulcer, skin breakdown, erythema, warmth, callus or dry skin  Left Foot:   Skin Integrity: Negative for ulcer, skin breakdown, erythema, warmth, callus or dry skin  Lymphadenopathy:     He has no cervical adenopathy  Neurological: He is alert and oriented to person, place, and time  No cranial nerve deficit  Skin: Skin is warm  He is not diaphoretic  No erythema  Psychiatric: He has a normal mood and affect   Judgment and thought content normal        Pertinent Laboratory/Diagnostic Studies:  Lab Results   Component Value Date    GLUCOSE 125 08/22/2014    BUN 14 01/20/2020    CREATININE 1 06 01/20/2020    CALCIUM 9 2 01/20/2020     08/22/2014    K 4 1 01/20/2020    CO2 32 01/20/2020     01/20/2020     Lab Results   Component Value Date    ALT 27 01/20/2020    AST 9 01/20/2020    ALKPHOS 61 01/20/2020    BILITOT 0 97 08/22/2014       Lab Results   Component Value Date    WBC 15 32 (H) 01/20/2020    HGB 15 3 01/20/2020    HCT 46 9 01/20/2020    MCV 93 01/20/2020     01/20/2020       No results found for: TSH    Lab Results   Component Value Date    CHOL 217 08/22/2014     Lab Results   Component Value Date    TRIG 217 (H) 01/20/2020     Lab Results   Component Value Date    HDL 39 (L) 01/20/2020     Lab Results   Component Value Date    LDLCALC 116 (H) 01/20/2020     Lab Results   Component Value Date    HGBA1C 8 6 (H) 01/20/2020       Results for orders placed or performed in visit on 01/20/20   CBC and differential   Result Value Ref Range    WBC 15 32 (H) 4 31 - 10 16 Thousand/uL    RBC 5 07 3 88 - 5 62 Million/uL    Hemoglobin 15 3 12 0 - 17 0 g/dL    Hematocrit 46 9 36 5 - 49 3 %    MCV 93 82 - 98 fL    MCH 30 2 26 8 - 34 3 pg    MCHC 32 6 31 4 - 37 4 g/dL    RDW 13 4 11 6 - 15 1 %    MPV 11 3 8 9 - 12 7 fL    Platelets 044 976 - 409 Thousands/uL    nRBC 0 /100 WBCs    Neutrophils Relative 64 43 - 75 %    Immat GRANS % 0 0 - 2 %    Lymphocytes Relative 32 14 - 44 %    Monocytes Relative 3 (L) 4 - 12 %    Eosinophils Relative 1 0 - 6 %    Basophils Relative 0 0 - 1 %    Neutrophils Absolute 9 74 (H) 1 85 - 7 62 Thousands/µL    Immature Grans Absolute 0 05 0 00 - 0 20 Thousand/uL    Lymphocytes Absolute 4 95 (H) 0 60 - 4 47 Thousands/µL    Monocytes Absolute 0 44 0 17 - 1 22 Thousand/µL    Eosinophils Absolute 0 11 0 00 - 0 61 Thousand/µL    Basophils Absolute 0 03 0 00 - 0 10 Thousands/µL   Comprehensive metabolic panel   Result Value Ref Range    Sodium 139 136 - 145 mmol/L    Potassium 4 1 3 5 - 5 3 mmol/L    Chloride 102 100 - 108 mmol/L    CO2 32 21 - 32 mmol/L    ANION GAP 5 4 - 13 mmol/L    BUN 14 5 - 25 mg/dL    Creatinine 1 06 0 60 - 1 30 mg/dL    Glucose, Fasting 208 (H) 65 - 99 mg/dL    Calcium 9 2 8 3 - 10 1 mg/dL    AST 9 5 - 45 U/L    ALT 27 12 - 78 U/L    Alkaline Phosphatase 61 46 - 116 U/L    Total Protein 7 3 6 4 - 8 2 g/dL    Albumin 3 5 3 5 - 5 0 g/dL    Total Bilirubin 2 03 (H) 0 20 - 1 00 mg/dL    eGFR 76 ml/min/1 73sq m   Lipid Panel with Direct LDL reflex   Result Value Ref Range    Cholesterol 198 50 - 200 mg/dL    Triglycerides 217 (H) <=150 mg/dL    HDL, Direct 39 (L) >=40 mg/dL    LDL Calculated 116 (H) 0 - 100 mg/dL   Hemoglobin A1C Result Value Ref Range    Hemoglobin A1C 8 6 (H) 4 2 - 6 3 %     mg/dl   [facility] HIV 1/2 AG-AB combo   Result Value Ref Range    HIV-1/HIV-2 Ab Non-Reactive Non-Reactive       Orders Placed This Encounter   Procedures    TDAP VACCINE GREATER THAN OR EQUAL TO 8YO IM    Hepatitis C antibody    Hemoglobin A1C    Comprehensive metabolic panel    Ambulatory referral to Ophthalmology    Ambulatory referral to Gastroenterology       ALLERGIES:  No Known Allergies    Current Medications     Current Outpatient Medications   Medication Sig Dispense Refill    buPROPion (WELLBUTRIN XL) 150 mg 24 hr tablet Take 1 tablet (150 mg total) by mouth every morning 30 tablet 5    hydrochlorothiazide (HYDRODIURIL) 25 mg tablet TAKE 1 TABLET BY MOUTH EVERY DAY 90 tablet 3    LORazepam (ATIVAN) 0 5 mg tablet Take 1 tablet (0 5 mg total) by mouth every 8 (eight) hours as needed for anxiety 15 tablet 0    metFORMIN (GLUCOPHAGE) 1000 MG tablet Take 1 tablet (1,000 mg total) by mouth daily with breakfast 90 tablet 3     No current facility-administered medications for this visit            Health Maintenance     Health Maintenance   Topic Date Due    Hepatitis C Screening  1960    Pneumococcal Vaccine: Pediatrics (0 to 5 Years) and At-Risk Patients (6 to 59 Years) (1 of 1 - PPSV23) 05/17/1966    DM Eye Exam  05/17/1970    DTaP,Tdap,and Td Vaccines (1 - Tdap) 05/17/1971    Annual Physical  05/17/1978    Hepatitis B Vaccine (1 of 3 - Risk 3-dose series) 05/17/1979    CRC Screening: Colonoscopy  10/04/2015    Influenza Vaccine  02/16/2020 (Originally 7/1/2019)    HEMOGLOBIN A1C  07/20/2020    BMI: Followup Plan  01/20/2021    URINE MICROALBUMIN  01/20/2021    BMI: Adult  02/07/2021    Diabetic Foot Exam  02/07/2021    Pneumococcal Vaccine: 65+ Years (1 of 2 - PCV13) 05/17/2025    HIV Screening  Completed    HIB Vaccine  Aged Out    IPV Vaccine  Aged Out    Hepatitis A Vaccine  Aged Jonathon Thorpe Meningococcal ACWY Vaccine  Aged Out    HPV Vaccine  Aged Out     Immunization History   Administered Date(s) Administered    Influenza TIV (IM) 11/15/2011       Kelsey Panchal MD      Diabetic Foot Exam    Patient's shoes and socks removed  Right Foot/Ankle   Right Foot Inspection  Skin Exam: skin normal and skin intact no dry skin, no warmth, no callus, no erythema, no maceration, no abnormal color, no pre-ulcer, no ulcer and no callus                          Toe Exam: ROM and strength within normal limits  Sensory   Vibration: intact  Proprioception: intact   Monofilament testing: intact  Vascular    The right DP pulse is 2+  The right PT pulse is 2+  Left Foot/Ankle  Left Foot Inspection  Skin Exam: skin normal and skin intactno dry skin, no warmth, no erythema, no maceration, normal color, no pre-ulcer, no ulcer and no callus                         Toe Exam: ROM and strength within normal limits                   Sensory   Vibration: intact  Proprioception: intact  Monofilament: intact  Vascular    The left DP pulse is 2+  The left PT pulse is 2+     Assign Risk Category:  No deformity present; ; No weak pulses       Risk: 0

## 2020-02-07 NOTE — ASSESSMENT & PLAN NOTE
Blood pressure is well controlled with hydrochlorothiazide  He should probably be on an Ace or an Arb, but since we are adding metformin at this time, I am going to hold off on that switch for now

## 2020-02-11 ENCOUNTER — DOCUMENTATION (OUTPATIENT)
Dept: FAMILY MEDICINE CLINIC | Facility: CLINIC | Age: 60
End: 2020-02-11

## 2020-02-11 NOTE — PROGRESS NOTES
Elizabeth, PharmD, BCACP      The following is per review of patient's pertinent medical/medication history and phone call with patient's daughter:    Per patient's request, daughter contacted to discuss recent medication changes    Patient's insurance coverage: Payor: BLUE CROSS / Plan: [employer] PLAN 361 / Product Type: Blue Fee for Service /     Medications reviewed with daughter  Daughter reported patient was hesitant on meeting with [facility]'s diabetes educator but agrees to discuss benefits with him  Daughter agrees to encourage patient to contact clinical pharmacist or PCP if further diabetes questions or metformin tolerability issues  As patient was started on metformin, will contact patient in ~2 weeks to assess metformin tolerability  PCP: no further action required    Demographics  Interaction Method: Phone  Type of Intervention: Follow-Up    Topic(s) Addressed  Diabetes    Intervention(s) Made      Non-Pharmacologic: Other    Tool(s) Used  Not Applicable    Time Spent in Direct Patient Care Activities and Care Coordination: 0-15 Minutes    Recommendation(s) Accepted by the Patient/Caregiver:  All Accepted

## 2020-02-25 ENCOUNTER — TELEPHONE (OUTPATIENT)
Dept: FAMILY MEDICINE CLINIC | Facility: CLINIC | Age: 60
End: 2020-02-25

## 2020-02-25 ENCOUNTER — DOCUMENTATION (OUTPATIENT)
Dept: FAMILY MEDICINE CLINIC | Facility: CLINIC | Age: 60
End: 2020-02-25

## 2020-02-25 NOTE — PROGRESS NOTES
Elizabeth, PharmD, Ryland Gentile    The following is per review of patient's pertinent medical/medication history and phone call with patient:     Patient returned phone call to pharmacist      Patient reports he has been taking metformin 1000mg daily, denies diarrhea, and has significantly reduced CHO intake since last appt  Has been walking every day and stopped eating after 7PM  Had a craving for a slice of pizza but decided to hold off on the pizza after seeing it had ~80 grams per serving  Has also been spending time at grocery store reading food labels and making sure he is choosing foods with lower CHO options and has been trying to stick to ~1500 calories per day  Has also switched from high sugar meals    Has lost ~26 pounds since last appt  Was interested in completing diabetes education through [facility] if covered by insurance but was told by insurance he would have to submit paperwork to be approval  However, may have been thinking about colonscopy  Will ask diabetes  to contact patient to schedule classes  Follow-up: with PCP as scheduled in 5/2020, agrees to have labs drawn prior to appt  Contact clinical pharmacist if any further questions/concerns    Demographics  Interaction Method: Phone  Type of Intervention: Follow-Up    Topic(s) Addressed  Diabetes    Intervention(s) Made      Non-Pharmacologic: Other    Tool(s) Used  Not Applicable    Time Spent in Direct Patient Care Activities and Care Coordination: 16-30 Minutes    Recommendation(s) Accepted by the Patient/Caregiver:  All Accepted

## 2020-02-25 NOTE — PROGRESS NOTES
Matheus Apodaca,   I called and left another message for pt to call and schedule when he was able to!

## 2020-02-25 NOTE — TELEPHONE ENCOUNTER
Call placed to f/u on metformin tolerability but unable to reach patient  VM left requesting a return phone call       PCP: no further action required, just [facility]

## 2020-04-30 LAB
HBA1C MFR BLD HPLC: 6.8 %
HCV AB SER-ACNC: NEGATIVE

## 2020-05-18 ENCOUNTER — TELEMEDICINE (OUTPATIENT)
Dept: FAMILY MEDICINE CLINIC | Facility: CLINIC | Age: 60
End: 2020-05-18
Payer: COMMERCIAL

## 2020-05-18 VITALS — BODY MASS INDEX: 38.26 KG/M2 | WEIGHT: 298 LBS

## 2020-05-18 DIAGNOSIS — E11.9 TYPE 2 DIABETES MELLITUS WITHOUT COMPLICATION, WITHOUT LONG-TERM CURRENT USE OF INSULIN (HCC): Primary | ICD-10-CM

## 2020-05-18 DIAGNOSIS — F41.1 GENERALIZED ANXIETY DISORDER: ICD-10-CM

## 2020-05-18 DIAGNOSIS — E66.01 CLASS 2 SEVERE OBESITY DUE TO EXCESS CALORIES WITH SERIOUS COMORBIDITY AND BODY MASS INDEX (BMI) OF 38.0 TO 38.9 IN ADULT (HCC): ICD-10-CM

## 2020-05-18 DIAGNOSIS — I10 ESSENTIAL HYPERTENSION: ICD-10-CM

## 2020-05-18 DIAGNOSIS — F33.1 MODERATE EPISODE OF RECURRENT MAJOR DEPRESSIVE DISORDER (HCC): ICD-10-CM

## 2020-05-18 PROBLEM — E66.812 CLASS 2 SEVERE OBESITY DUE TO EXCESS CALORIES WITH SERIOUS COMORBIDITY AND BODY MASS INDEX (BMI) OF 38.0 TO 38.9 IN ADULT (HCC): Status: ACTIVE | Noted: 2017-07-21

## 2020-05-18 PROCEDURE — 99214 OFFICE O/P EST MOD 30 MIN: CPT | Performed by: FAMILY MEDICINE

## 2020-05-18 RX ORDER — HYDROCHLOROTHIAZIDE 25 MG/1
25 TABLET ORAL DAILY
Qty: 90 TABLET | Refills: 3 | Status: SHIPPED | OUTPATIENT
Start: 2020-05-18 | End: 2021-06-01

## 2020-06-01 ENCOUNTER — TELEPHONE (OUTPATIENT)
Dept: GASTROENTEROLOGY | Facility: MEDICAL CENTER | Age: 60
End: 2020-06-01

## 2020-06-01 ENCOUNTER — TELEMEDICINE (OUTPATIENT)
Dept: GASTROENTEROLOGY | Facility: MEDICAL CENTER | Age: 60
End: 2020-06-01
Payer: COMMERCIAL

## 2020-06-01 DIAGNOSIS — E11.9 TYPE 2 DIABETES MELLITUS WITHOUT COMPLICATION, WITHOUT LONG-TERM CURRENT USE OF INSULIN (HCC): ICD-10-CM

## 2020-06-01 DIAGNOSIS — Z86.010 HX OF COLONIC POLYPS: Primary | ICD-10-CM

## 2020-06-01 PROBLEM — Z86.0100 HX OF COLONIC POLYPS: Status: ACTIVE | Noted: 2020-06-01

## 2020-06-01 PROCEDURE — 99203 OFFICE O/P NEW LOW 30 MIN: CPT | Performed by: INTERNAL MEDICINE

## 2020-06-08 ENCOUNTER — TELEPHONE (OUTPATIENT)
Dept: GASTROENTEROLOGY | Facility: MEDICAL CENTER | Age: 60
End: 2020-06-08

## 2020-07-13 DIAGNOSIS — F33.1 MODERATE EPISODE OF RECURRENT MAJOR DEPRESSIVE DISORDER (HCC): ICD-10-CM

## 2020-07-14 RX ORDER — BUPROPION HYDROCHLORIDE 150 MG/1
TABLET ORAL
Qty: 90 TABLET | Refills: 1 | Status: SHIPPED | OUTPATIENT
Start: 2020-07-14 | End: 2021-01-11

## 2021-01-10 DIAGNOSIS — E11.9 TYPE 2 DIABETES MELLITUS WITHOUT COMPLICATION, WITHOUT LONG-TERM CURRENT USE OF INSULIN (HCC): Primary | ICD-10-CM

## 2021-01-10 DIAGNOSIS — F33.1 MODERATE EPISODE OF RECURRENT MAJOR DEPRESSIVE DISORDER (HCC): ICD-10-CM

## 2021-01-11 RX ORDER — BUPROPION HYDROCHLORIDE 150 MG/1
TABLET ORAL
Qty: 90 TABLET | Refills: 3 | Status: SHIPPED | OUTPATIENT
Start: 2021-01-11 | End: 2022-01-16

## 2021-03-22 ENCOUNTER — TELEPHONE (OUTPATIENT)
Dept: FAMILY MEDICINE CLINIC | Facility: CLINIC | Age: 61
End: 2021-03-22

## 2021-03-22 NOTE — TELEPHONE ENCOUNTER
Called and l/m for pt to callback and schedule a physical/Dm check  Last physically seen was in February 2020

## 2021-05-20 ENCOUNTER — VBI (OUTPATIENT)
Dept: ADMINISTRATIVE | Facility: OTHER | Age: 61
End: 2021-05-20

## 2021-05-30 DIAGNOSIS — I10 ESSENTIAL HYPERTENSION: ICD-10-CM

## 2021-06-01 RX ORDER — HYDROCHLOROTHIAZIDE 25 MG/1
TABLET ORAL
Qty: 90 TABLET | Refills: 3 | Status: SHIPPED | OUTPATIENT
Start: 2021-06-01 | End: 2022-05-31

## 2021-07-12 ENCOUNTER — OFFICE VISIT (OUTPATIENT)
Dept: FAMILY MEDICINE CLINIC | Facility: CLINIC | Age: 61
End: 2021-07-12
Payer: COMMERCIAL

## 2021-07-12 VITALS
HEART RATE: 72 BPM | HEIGHT: 72 IN | OXYGEN SATURATION: 97 % | DIASTOLIC BLOOD PRESSURE: 78 MMHG | SYSTOLIC BLOOD PRESSURE: 130 MMHG | RESPIRATION RATE: 20 BRPM | BODY MASS INDEX: 37.93 KG/M2 | TEMPERATURE: 98.5 F | WEIGHT: 280 LBS

## 2021-07-12 DIAGNOSIS — Z86.010 HX OF COLONIC POLYPS: ICD-10-CM

## 2021-07-12 DIAGNOSIS — F33.1 MODERATE EPISODE OF RECURRENT MAJOR DEPRESSIVE DISORDER (HCC): ICD-10-CM

## 2021-07-12 DIAGNOSIS — Z12.11 SCREENING FOR COLON CANCER: ICD-10-CM

## 2021-07-12 DIAGNOSIS — E66.01 CLASS 2 SEVERE OBESITY DUE TO EXCESS CALORIES WITH SERIOUS COMORBIDITY AND BODY MASS INDEX (BMI) OF 37.0 TO 37.9 IN ADULT (HCC): ICD-10-CM

## 2021-07-12 DIAGNOSIS — E11.9 TYPE 2 DIABETES MELLITUS WITHOUT COMPLICATION, WITHOUT LONG-TERM CURRENT USE OF INSULIN (HCC): Primary | ICD-10-CM

## 2021-07-12 DIAGNOSIS — Z12.5 PROSTATE CANCER SCREENING: ICD-10-CM

## 2021-07-12 DIAGNOSIS — I10 ESSENTIAL HYPERTENSION: ICD-10-CM

## 2021-07-12 DIAGNOSIS — F41.1 GENERALIZED ANXIETY DISORDER: ICD-10-CM

## 2021-07-12 LAB — SL AMB POCT HEMOGLOBIN AIC: 5.3 (ref ?–6.5)

## 2021-07-12 PROCEDURE — 83036 HEMOGLOBIN GLYCOSYLATED A1C: CPT | Performed by: FAMILY MEDICINE

## 2021-07-12 PROCEDURE — 99214 OFFICE O/P EST MOD 30 MIN: CPT | Performed by: FAMILY MEDICINE

## 2021-07-12 PROCEDURE — 3044F HG A1C LEVEL LT 7.0%: CPT | Performed by: FAMILY MEDICINE

## 2021-07-12 PROCEDURE — 3725F SCREEN DEPRESSION PERFORMED: CPT | Performed by: FAMILY MEDICINE

## 2021-07-12 PROCEDURE — 3008F BODY MASS INDEX DOCD: CPT | Performed by: FAMILY MEDICINE

## 2021-07-12 PROCEDURE — 4004F PT TOBACCO SCREEN RCVD TLK: CPT | Performed by: FAMILY MEDICINE

## 2021-07-12 NOTE — ASSESSMENT & PLAN NOTE
He is due for colonoscopy  He had some issues getting colonoscopy set up previously as he would like to know specific cost   I did explain that this is sometimes difficult to no from a providers perspective    He seems more willing to get the colonoscopy done at this time and I reached out to GI

## 2021-07-12 NOTE — PROGRESS NOTES
Assessment/Plan:  BMI Counseling: Body mass index is 37 97 kg/m²  The BMI is above normal  Nutrition recommendations include encouraging healthy choices of fruits and vegetables  Exercise recommendations include moderate physical activity 150 minutes/week  Problem List Items Addressed This Visit        Endocrine    Type 2 diabetes mellitus without complication, without long-term current use of insulin ([facility] [address] )       Lab Results   Component Value Date    HGBA1C 5 3 07/12/2021   Check labs as outlined  A1c was excellent today  Relevant Orders    POCT hemoglobin A1c (Completed)    CBC and differential    Comprehensive metabolic panel    Lipid Panel with Direct LDL reflex    Microalbumin / creatinine urine ratio       Cardiovascular and Mediastinum    Essential hypertension     Blood pressure is very well controlled  Continue current regimen  Relevant Orders    CBC and differential    Comprehensive metabolic panel       Other    Moderate episode of recurrent major depressive disorder (HCC)     During mood seems stable at this time  Continue Wellbutrin  Class 2 severe obesity due to excess calories with serious comorbidity and body mass index (BMI) of 37 0 to 37 9 in adult [facility])     He continues to lose weight with diet and exercise changes  Generalized anxiety disorder     Stable at this time  Continue bupropion  He has lorazepam for as needed use         Hx of colonic polyps     He is due for colonoscopy  He had some issues getting colonoscopy set up previously as he would like to know specific cost   I did explain that this is sometimes difficult to no from a providers perspective    He seems more willing to get the colonoscopy done at this time and I reached out to GI           Other Visit Diagnoses     Screening for colon cancer    -  Primary    Relevant Orders    Ambulatory referral to Gastroenterology    Prostate cancer screening        Relevant Orders    PSA, Total Screen            Subjective:      Patient ID: Gisel Aleman is a 64 y o  male  HPI patient presents today for follow-up for chronic health issues  He has type 2 diabetes  He has lost 70 lb over the past several years and is doing extremely well  He tolerates metformin  He has history of hypertension and denies chest pain, shortness of breath, palpitations or lightheadedness  The following portions of the patient's history were reviewed and updated as appropriate: allergies, current medications, past family history, past medical history, past social history, past surgical history and problem list       Current Outpatient Medications:     buPROPion (WELLBUTRIN XL) 150 mg 24 hr tablet, TAKE 1 TABLET BY MOUTH EVERY MORNING, Disp: 90 tablet, Rfl: 3    hydrochlorothiazide (HYDRODIURIL) 25 mg tablet, TAKE 1 TABLET BY MOUTH EVERY DAY, Disp: 90 tablet, Rfl: 3    LORazepam (ATIVAN) 0 5 mg tablet, Take 1 tablet (0 5 mg total) by mouth every 8 (eight) hours as needed for anxiety, Disp: 15 tablet, Rfl: 0    metFORMIN (GLUCOPHAGE) 1000 MG tablet, TAKE 1 TABLET BY MOUTH EVERY DAY WITH BREAKFAST, Disp: 90 tablet, Rfl: 3    polyethylene glycol (GOLYTELY) 4000 mL solution, Take 4,000 mL by mouth once for 1 dose, Disp: 4000 mL, Rfl: 0     Review of Systems   Constitutional: Negative for appetite change, chills, fatigue, fever and unexpected weight change  HENT: Negative for trouble swallowing  Eyes: Negative for visual disturbance  Respiratory: Negative for cough, chest tightness, shortness of breath and wheezing  Cardiovascular: Negative for chest pain, palpitations and leg swelling  Gastrointestinal: Negative for abdominal distention, abdominal pain, blood in stool, constipation and diarrhea  Endocrine: Negative for polyuria  Genitourinary: Negative for difficulty urinating and flank pain  Musculoskeletal: Negative for arthralgias and myalgias  Skin: Negative for rash     Neurological: Negative for dizziness and light-headedness  Hematological: Negative for adenopathy  Does not bruise/bleed easily  Psychiatric/Behavioral: Negative for dysphoric mood and sleep disturbance  The patient is not nervous/anxious  Objective:      /78 (BP Location: Left arm, Patient Position: Sitting, Cuff Size: Adult)   Pulse 72   Temp 98 5 °F (36 9 °C) (Tympanic)   Resp 20   Ht 6' (1 829 m)   Wt 127 kg (280 lb)   SpO2 97%   BMI 37 97 kg/m²          Physical Exam  Vitals reviewed  Constitutional:       General: He is not in acute distress  Appearance: He is well-developed  He is obese  He is not diaphoretic  HENT:      Head: Normocephalic  Eyes:      General:         Right eye: No discharge  Left eye: No discharge  Pupils: Pupils are equal, round, and reactive to light  Neck:      Thyroid: No thyromegaly  Trachea: No tracheal deviation  Cardiovascular:      Rate and Rhythm: Normal rate and regular rhythm  Pulses: no weak pulses          Dorsalis pedis pulses are 2+ on the right side and 2+ on the left side  Posterior tibial pulses are 2+ on the right side and 2+ on the left side  Heart sounds: Normal heart sounds  No murmur heard  Pulmonary:      Effort: Pulmonary effort is normal  No respiratory distress  Breath sounds: No wheezing or rales  Abdominal:      General: There is no distension  Palpations: Abdomen is soft  Tenderness: There is no abdominal tenderness  Musculoskeletal:         General: Normal range of motion  Right lower leg: No edema  Left lower leg: No edema  Feet:      Right foot:      Skin integrity: No ulcer, skin breakdown, erythema, warmth, callus or dry skin  Left foot:      Skin integrity: No ulcer, skin breakdown, erythema, warmth, callus or dry skin  Lymphadenopathy:      Cervical: No cervical adenopathy  Skin:     General: Skin is warm  Findings: No erythema     Neurological: Mental Status: He is alert and oriented to person, place, and time  Cranial Nerves: No cranial nerve deficit  Psychiatric:         Thought Content: Thought content normal          Judgment: Judgment normal            Oneida Hernandez MD    Diabetic Foot Exam    Patient's shoes and socks removed  Right Foot/Ankle   Right Foot Inspection  Skin Exam: skin normal and skin intact no dry skin, no warmth, no callus, no erythema, no maceration, no abnormal color, no pre-ulcer, no ulcer and no callus                            Sensory   Vibration: intact  Proprioception: intact   Monofilament testing: intact  Vascular    The right DP pulse is 2+  The right PT pulse is 2+  Left Foot/Ankle  Left Foot Inspection  Skin Exam: skin normal and skin intactno dry skin, no warmth, no erythema, no maceration, normal color, no pre-ulcer, no ulcer and no callus                                         Sensory   Vibration: intact  Proprioception: intact  Monofilament: intact  Vascular    The left DP pulse is 2+  The left PT pulse is 2+  Assign Risk Category:  No deformity present; No loss of protective sensation;  No weak pulses       Risk: 0

## 2021-07-12 NOTE — ASSESSMENT & PLAN NOTE
Lab Results   Component Value Date    HGBA1C 5 3 07/12/2021   Check labs as outlined  A1c was excellent today

## 2021-07-13 ENCOUNTER — PREP FOR PROCEDURE (OUTPATIENT)
Dept: GASTROENTEROLOGY | Facility: CLINIC | Age: 61
End: 2021-07-13

## 2021-07-13 DIAGNOSIS — Z86.010 HX OF COLONIC POLYPS: Primary | ICD-10-CM

## 2021-07-19 ENCOUNTER — TELEPHONE (OUTPATIENT)
Dept: GASTROENTEROLOGY | Facility: CLINIC | Age: 61
End: 2021-07-19

## 2021-07-19 NOTE — TELEPHONE ENCOUNTER
----- Message from Mildred Stack MD sent at 7/13/2021  7:45 AM EDT -----  Of course  Tracy Lindsey- can you please reach out to schedule thanks  ----- Message -----  From: Zulma Apple MD  Sent: 7/12/2021   5:04 PM EDT  To: MD Franklin Gomez,    This patient is now willing to undergo colonoscopy  He was concerned about some insurance issues previously but believes he is now ready  I was hoping your staff could reach out to him at some point to get him back on schedule        Thank you,    Trinidad Rendon

## 2021-08-27 LAB
LEFT EYE DIABETIC RETINOPATHY: NORMAL
RIGHT EYE DIABETIC RETINOPATHY: NORMAL
SEVERITY (EYE EXAM): NORMAL

## 2022-01-01 DIAGNOSIS — E11.9 TYPE 2 DIABETES MELLITUS WITHOUT COMPLICATION, WITHOUT LONG-TERM CURRENT USE OF INSULIN (HCC): ICD-10-CM

## 2022-01-05 ENCOUNTER — RA CDI HCC (OUTPATIENT)
Dept: OTHER | Facility: HOSPITAL | Age: 62
End: 2022-01-05

## 2022-01-05 NOTE — PROGRESS NOTES
[facility]  coding opportunities          Number of diagnosis code(s) already on the problem list added to FYI flag: 3                     Patients insurance company: [employer] (Medicare Advantage and Commercial)     Visit status: Patient canceled the appointment        [facility]  coding opportunities          Number of diagnosis code(s) already on the problem list added to [employer] flag: 3                     Patients insurance company: [employer] [address] [place] [address] ([place])           With the beginning of the new year, this is a reminder to address all [facility]  (risk adjustment) codes for 2022 as patient scores reset to zero EMMETT   1) E11 9 Type 2 diabetes mellitus without complication, without long-term current use of insulin (HCC)  2) E66 01 Class 2 severe obesity due to excess calories with serious comorbidity ([facility] [address] )  3) F33 1 Moderate episode of recurrent major depressive disorder ([facility] [address] )

## 2022-01-13 ENCOUNTER — TELEPHONE (OUTPATIENT)
Dept: FAMILY MEDICINE CLINIC | Facility: CLINIC | Age: 62
End: 2022-01-13

## 2022-01-13 NOTE — TELEPHONE ENCOUNTER
Called and l/m to callback Re: scheduling a physical  Last seen 7/2021 is due for a follow up and does not have a follow up scheduled

## 2022-01-16 DIAGNOSIS — F33.1 MODERATE EPISODE OF RECURRENT MAJOR DEPRESSIVE DISORDER (HCC): ICD-10-CM

## 2022-01-16 RX ORDER — BUPROPION HYDROCHLORIDE 150 MG/1
TABLET ORAL
Qty: 90 TABLET | Refills: 0 | Status: SHIPPED | OUTPATIENT
Start: 2022-01-16 | End: 2022-04-15 | Stop reason: ALTCHOICE

## 2022-01-17 NOTE — TELEPHONE ENCOUNTER
Pt called stating he is having an issue with his imsuriance and will schedule an appt as soon as he gets his insurance problem fixed

## 2022-01-20 NOTE — TELEPHONE ENCOUNTER
Pt was called and he stated not to keep calling him because he has other things he needs to take care of   He will call when he has time to  He has been getting calls everyday he stated and he will not schedule an appointment today because right now he hill snot have any time

## 2022-04-08 ENCOUNTER — RA CDI HCC (OUTPATIENT)
Dept: OTHER | Facility: HOSPITAL | Age: 62
End: 2022-04-08

## 2022-04-08 NOTE — PROGRESS NOTES
[facility]  [facility]          Chart Reviewed number of suggestions sent to Provider: 3         With the beginning of the new year, this is a reminder to address all [facility] [address]  (risk adjustment) codes for 2022 as patient scores reset to zero EMMETT   1) E11 9 Type 2 diabetes mellitus without complication, without long-term current use of insulin (HCC)  2) E66 01 Class 2 severe obesity due to excess calories with serious comorbidity ([facility] [address] )  3) F33 1 Moderate episode of recurrent major depressive disorder ([facility] [address] )    Patients Insurance        Commercial Insurance: [address] [place] [address]

## 2022-04-15 ENCOUNTER — OFFICE VISIT (OUTPATIENT)
Dept: FAMILY MEDICINE CLINIC | Facility: CLINIC | Age: 62
End: 2022-04-15
Payer: COMMERCIAL

## 2022-04-15 VITALS
BODY MASS INDEX: 39.55 KG/M2 | OXYGEN SATURATION: 96 % | HEART RATE: 70 BPM | RESPIRATION RATE: 18 BRPM | SYSTOLIC BLOOD PRESSURE: 124 MMHG | WEIGHT: 292 LBS | HEIGHT: 72 IN | DIASTOLIC BLOOD PRESSURE: 80 MMHG

## 2022-04-15 DIAGNOSIS — E66.01 CLASS 2 SEVERE OBESITY DUE TO EXCESS CALORIES WITH SERIOUS COMORBIDITY AND BODY MASS INDEX (BMI) OF 39.0 TO 39.9 IN ADULT (HCC): ICD-10-CM

## 2022-04-15 DIAGNOSIS — Z85.828 HISTORY OF BASAL CELL CARCINOMA (BCC) EXCISION: ICD-10-CM

## 2022-04-15 DIAGNOSIS — F41.1 GENERALIZED ANXIETY DISORDER: ICD-10-CM

## 2022-04-15 DIAGNOSIS — Z98.890 HISTORY OF BASAL CELL CARCINOMA (BCC) EXCISION: ICD-10-CM

## 2022-04-15 DIAGNOSIS — I10 ESSENTIAL HYPERTENSION: ICD-10-CM

## 2022-04-15 DIAGNOSIS — F33.1 MODERATE EPISODE OF RECURRENT MAJOR DEPRESSIVE DISORDER (HCC): ICD-10-CM

## 2022-04-15 DIAGNOSIS — Z86.010 HX OF COLONIC POLYPS: ICD-10-CM

## 2022-04-15 DIAGNOSIS — Z00.00 ANNUAL PHYSICAL EXAM: Primary | ICD-10-CM

## 2022-04-15 DIAGNOSIS — Z12.5 PROSTATE CANCER SCREENING: ICD-10-CM

## 2022-04-15 DIAGNOSIS — E11.9 TYPE 2 DIABETES MELLITUS WITHOUT COMPLICATION, WITHOUT LONG-TERM CURRENT USE OF INSULIN (HCC): ICD-10-CM

## 2022-04-15 DIAGNOSIS — F17.200 CURRENT EVERY DAY SMOKER: ICD-10-CM

## 2022-04-15 PROBLEM — G56.01 CARPAL TUNNEL SYNDROME OF RIGHT WRIST: Status: RESOLVED | Noted: 2017-07-21 | Resolved: 2022-04-15

## 2022-04-15 LAB — SL AMB POCT HEMOGLOBIN AIC: 5.3 (ref ?–6.5)

## 2022-04-15 PROCEDURE — 3079F DIAST BP 80-89 MM HG: CPT | Performed by: FAMILY MEDICINE

## 2022-04-15 PROCEDURE — 3725F SCREEN DEPRESSION PERFORMED: CPT | Performed by: FAMILY MEDICINE

## 2022-04-15 PROCEDURE — 83036 HEMOGLOBIN GLYCOSYLATED A1C: CPT | Performed by: FAMILY MEDICINE

## 2022-04-15 PROCEDURE — 3008F BODY MASS INDEX DOCD: CPT | Performed by: FAMILY MEDICINE

## 2022-04-15 PROCEDURE — 3044F HG A1C LEVEL LT 7.0%: CPT | Performed by: FAMILY MEDICINE

## 2022-04-15 PROCEDURE — 4004F PT TOBACCO SCREEN RCVD TLK: CPT | Performed by: FAMILY MEDICINE

## 2022-04-15 PROCEDURE — 99396 PREV VISIT EST AGE 40-64: CPT | Performed by: FAMILY MEDICINE

## 2022-04-15 PROCEDURE — 3074F SYST BP LT 130 MM HG: CPT | Performed by: FAMILY MEDICINE

## 2022-04-15 RX ORDER — LORAZEPAM 0.5 MG/1
0.5 TABLET ORAL EVERY 8 HOURS PRN
Qty: 15 TABLET | Refills: 0 | Status: SHIPPED | OUTPATIENT
Start: 2022-04-15

## 2022-04-15 NOTE — PATIENT INSTRUCTIONS
Wellness Visit for Adults   AMBULATORY CARE:   A wellness visit  is when you see your healthcare provider to get screened for health problems  Your healthcare provider will also give you advice on how to stay healthy  Write down your questions so you remember to ask them  Ask your healthcare provider how often you should have a wellness visit  What happens at a wellness visit:  Your healthcare provider will ask about your health, and your family history of health problems  This includes high blood pressure, heart disease, and cancer  He or she will ask if you have symptoms that concern you, if you smoke, and about your mood  You may also be asked about your intake of medicines, supplements, food, and alcohol  Any of the following may be done:  · Your weight  will be checked  Your height may also be checked so your body mass index (BMI) can be calculated  Your BMI shows if you are at a healthy weight  · Your blood pressure  and heart rate will be checked  Your temperature may also be checked  · Blood and urine tests  may be done  Blood tests may be done to check your cholesterol levels  Abnormal cholesterol levels increase your risk for heart disease and stroke  You may also need a blood or urine test to check for diabetes if you are at increased risk  Urine tests may be done to look for signs of an infection or kidney disease  · A physical exam  includes checking your heartbeat and lungs with a stethoscope  Your healthcare provider may also check your skin to look for sun damage  · Screening tests  may be recommended  A screening test is done to check for diseases that may not cause symptoms  The screening tests you may need depend on your age, gender, family history, and lifestyle habits  For example, colorectal screening may be recommended if you are 48years old or older  Screening tests you need if you are a woman:   · A Pap smear  is used to screen for cervical cancer   Pap smears are usually done every 3 to 5 years depending on your age  You may need them more often if you have had abnormal Pap smear test results in the past  Ask your healthcare provider how often you should have a Pap smear  · A mammogram  is an x-ray of your breasts to screen for breast cancer  Experts recommend mammograms every 2 years starting at age 48 years  You may need a mammogram at age 52 years or younger if you have an increased risk for breast cancer  Talk to your healthcare provider about when you should start having mammograms and how often you need them  Vaccines you may need:   · Get an influenza vaccine  every year  The influenza vaccine protects you from the flu  Several types of viruses cause the flu  The viruses change over time, so new vaccines are made each year  · Get a tetanus-diphtheria (Td) booster vaccine  every 10 years  This vaccine protects you against tetanus and diphtheria  Tetanus is a severe infection that may cause painful muscle spasms and lockjaw  Diphtheria is a severe bacterial infection that causes a thick covering in the back of your mouth and throat  · Get a human papillomavirus (HPV) vaccine  if you are female and aged 23 to 32 or male 23 to 24 and never received it  This vaccine protects you from HPV infection  HPV is the most common infection spread by sexual contact  HPV may also cause vaginal, penile, and anal cancers  · Get a pneumococcal vaccine  if you are aged 72 years or older  The pneumococcal vaccine is an injection given to protect you from pneumococcal disease  Pneumococcal disease is an infection caused by pneumococcal bacteria  The infection may cause pneumonia, meningitis, or an ear infection  · Get a shingles vaccine  if you are 60 or older, even if you have had shingles before  The shingles vaccine is an injection to protect you from the varicella-zoster virus  This is the same virus that causes chickenpox   Shingles is a painful rash that develops in people who had chickenpox or have been exposed to the virus  How to eat healthy:  My Plate is a model for planning healthy meals  It shows the types and amounts of foods that should go on your plate  Fruits and vegetables make up about half of your plate, and grains and protein make up the other half  A serving of dairy is included on the side of your plate  The amount of calories and serving sizes you need depends on your age, gender, weight, and height  Examples of healthy foods are listed below:  · Eat a variety of vegetables  such as dark green, red, and orange vegetables  You can also include canned vegetables low in sodium (salt) and frozen vegetables without added butter or sauces  · Eat a variety of fresh fruits , canned fruit in 100% juice, frozen fruit, and dried fruit  · Include whole grains  At least half of the grains you eat should be whole grains  Examples include whole-wheat bread, wheat pasta, brown rice, and whole-grain cereals such as oatmeal     · Eat a variety of protein foods such as seafood (fish and shellfish), lean meat, and poultry without skin (turkey and chicken)  Examples of lean meats include pork leg, shoulder, or tenderloin, and beef round, sirloin, tenderloin, and extra lean ground beef  Other protein foods include eggs and egg substitutes, beans, peas, soy products, nuts, and seeds  · Choose low-fat dairy products such as skim or 1% milk or low-fat yogurt, cheese, and cottage cheese  · Limit unhealthy fats  such as butter, hard margarine, and shortening  Exercise:  Exercise at least 30 minutes per day on most days of the week  Some examples of exercise include walking, biking, dancing, and swimming  You can also fit in more physical activity by taking the stairs instead of the elevator or parking farther away from stores  Include muscle strengthening activities 2 days each week  Regular exercise provides many health benefits   It helps you manage your weight, and decreases your risk for type 2 diabetes, heart disease, stroke, and high blood pressure  Exercise can also help improve your mood  Ask your healthcare provider about the best exercise plan for you  General health and safety guidelines:   · Do not smoke  Nicotine and other chemicals in cigarettes and cigars can cause lung damage  Ask your healthcare provider for information if you currently smoke and need help to quit  E-cigarettes or smokeless tobacco still contain nicotine  Talk to your healthcare provider before you use these products  · Limit alcohol  A drink of alcohol is 12 ounces of beer, 5 ounces of wine, or 1½ ounces of liquor  · Lose weight, if needed  Being overweight increases your risk of certain health conditions  These include heart disease, high blood pressure, type 2 diabetes, and certain types of cancer  · Protect your skin  Do not sunbathe or use tanning beds  Use sunscreen with a SPF 15 or higher  Apply sunscreen at least 15 minutes before you go outside  Reapply sunscreen every 2 hours  Wear protective clothing, hats, and sunglasses when you are outside  · Drive safely  Always wear your seatbelt  Make sure everyone in your car wears a seatbelt  A seatbelt can save your life if you are in an accident  Do not use your cell phone when you are driving  This could distract you and cause an accident  Pull over if you need to make a call or send a text message  · Practice safe sex  Use latex condoms if are sexually active and have more than one partner  Your healthcare provider may recommend screening tests for sexually transmitted infections (STIs)  · Wear helmets, lifejackets, and protective gear  Always wear a helmet when you ride a bike or motorcycle, go skiing, or play sports that could cause a head injury  Wear protective equipment when you play sports  Wear a lifejacket when you are on a boat or doing water sports      © Copyright [employer] 2022 Information is for End User's use only and may not be sold, redistributed or otherwise used for commercial purposes  All illustrations and images included in CareNotes® are the copyrighted property of [employer] M [employer]  or Milena Palacios  The above information is an  only  It is not intended as medical advice for individual conditions or treatments  Talk to your doctor, nurse or pharmacist before following any medical regimen to see if it is safe and effective for you  Obesity   AMBULATORY CARE:   Obesity  means your body mass index (BMI) is greater than 30  Your healthcare provider will use your height and weight to measure your BMI  The risks of obesity include  many health problems, including injuries or physical disability  · Diabetes (high blood sugar level)    · High blood pressure or high cholesterol    · Heart disease    · Stroke    · Gallbladder or liver disease    · Cancer of the colon, breast, prostate, liver, or kidney    · Sleep apnea    · Arthritis or gout    Screening  is done to check for health conditions before you have signs or symptoms  If you are 28to 79years old, your blood sugar level may be checked every 3 years for signs of prediabetes or diabetes  Your healthcare provider will check your blood pressure at each visit  High blood pressure can lead to a stroke or other problems  Your provider may check for signs of heart disease, cancer, or other health problems  Seek care immediately if:   · You have a severe headache, confusion, or difficulty speaking  · You have weakness on one side of your body  · You have chest pain, sweating, or shortness of breath  Call your doctor if:   · You have symptoms of gallbladder or liver disease, such as pain in your upper abdomen  · You have knee or hip pain and discomfort while walking  · You have symptoms of diabetes, such as intense hunger and thirst, and frequent urination      · You have symptoms of sleep apnea, such as snoring or daytime sleepiness  · You have questions or concerns about your condition or care  Treatment for obesity  focuses on helping you lose weight to improve your health  Even a small decrease in BMI can reduce the risk for many health problems  Your healthcare provider will help you set a weight-loss goal   · Lifestyle changes  are the first step in treating obesity  These include making healthy food choices and getting regular physical activity  Your healthcare provider may suggest a weight-loss program that involves coaching, education, and therapy  · Medicine  may help you lose weight when it is used with a healthy foods and physical activity  · Surgery  can help you lose weight if you are very obese and have other health problems  There are several types of weight-loss surgery  Ask your healthcare provider for more information  Tips for safe weight loss:   · Set small, realistic goals  An example of a small goal is to walk for 20 minutes 5 days a week  Anther goal is to lose 5% of your body weight  · Tell friends, family members, and coworkers about your goals  and ask for their support  Ask a friend to lose weight with you, or join a weight-loss support group  · Identify foods or triggers that may cause you to overeat , and find ways to avoid them  Remove tempting high-calorie foods from your home and workplace  Place a bowl of fresh fruit on your kitchen counter  If stress causes you to eat, then find other ways to cope with stress  A counselor or therapist may be able to help you  · Keep a diary to track what you eat and drink  Also write down how many minutes of physical activity you do each day  Weigh yourself once a week and record it in your diary  Eating changes: You will need to eat 500 to 1,000 fewer calories each day than you currently eat to lose 1 to 2 pounds a week  The following changes will help you cut calories:  · Eat smaller portions    Use small plates, no larger than 9 inches in diameter  Fill your plate half full of fruits and vegetables  Measure your food using measuring cups until you know what a serving size looks like  · Eat 3 meals and 1 or 2 snacks each day  Plan your meals in advance  Francheska Cortez and eat at home most of the time  Eat slowly  Do not skip meals  Skipping meals can lead to overeating later in the day  This can make it harder for you to lose weight  Talk with a dietitian to help you make a meal plan and schedule that is right for you  · Eat fruits and vegetables at every meal   They are low in calories and high in fiber, which makes you feel full  Do not add butter, margarine, or cream sauce to vegetables  Use herbs to season steamed vegetables  · Eat less fat and fewer fried foods  Eat more baked or grilled chicken and fish  These protein sources are lower in calories and fat than red meat  Limit fast food  Dress your salads with olive oil and vinegar instead of bottled dressing  · Limit the amount of sugar you eat  Do not drink sugary beverages  Limit alcohol  Activity changes:  Physical activity is good for your body in many ways  It helps you burn calories and build strong muscles  It decreases stress and depression, and improves your mood  It can also help you sleep better  Talk to your healthcare provider before you begin an exercise program   · Exercise for at least 30 minutes 5 days a week  Start slowly  Set aside time each day for physical activity that you enjoy and that is convenient for you  It is best to do both weight training and an activity that increases your heart rate, such as walking, bicycling, or swimming  · Find ways to be more active  Do yard work and housecleaning  Walk up the stairs instead of using elevators  Spend your leisure time going to events that require walking, such as outdoor festivals or fairs  This extra physical activity can help you lose weight and keep it off         Follow up with your doctor as directed: You may need to meet with a dietitian  Write down your questions so you remember to ask them during your visits  © Copyright [address] 2022 Information is for End User's use only and may not be sold, redistributed or otherwise used for commercial purposes  All illustrations and images included in CareNotes® are the copyrighted property of [employer] M [employer] Inc  or [facility] Danny Gonzales   The above information is an  only  It is not intended as medical advice for individual conditions or treatments  Talk to your doctor, nurse or pharmacist before following any medical regimen to see if it is safe and effective for you

## 2022-04-15 NOTE — ASSESSMENT & PLAN NOTE
Lab Results   Component Value Date    HGBA1C 5 3 07/12/2021   He gets a gold star today for his A1c of 5 3  We will continue with dietary modifications to keep his diabetes under control  He does remain on metformin as well

## 2022-04-15 NOTE — PROGRESS NOTES
ADULT ANNUAL [facility]    NAME: Sarah Bravo  AGE: 64 y o  SEX: male  : 1960     DATE: 4/15/2022     Assessment and Plan:     Problem List Items Addressed This Visit        Endocrine    Type 2 diabetes mellitus without complication, without long-term current use of insulin ([facility] )       Lab Results   Component Value Date    HGBA1C 5 3 2021   He gets a gold star today for his A1c of 5 3  We will continue with dietary modifications to keep his diabetes under control  He does remain on metformin as well  Relevant Orders    POCT hemoglobin A1c    Lipid Panel with Direct LDL reflex    Microalbumin / creatinine urine ratio    Comprehensive metabolic panel    CBC and differential    Lipid Panel with Direct LDL reflex    Hemoglobin A1C       Cardiovascular and Mediastinum    Essential hypertension     Blood pressure remains very well controlled  He remains on low-dose hydrochlorothiazide  Relevant Orders    CBC and differential    Comprehensive metabolic panel    Lipid Panel with Direct LDL reflex    Comprehensive metabolic panel    CBC and differential    Lipid Panel with Direct LDL reflex    Hemoglobin A1C       Other    History of basal cell carcinoma (BCC) excision     He should follow-up with Dermatology  He has a relatively benign looking lesion on his nose on the left but I would like this evaluated         Relevant Orders    Ambulatory Referral to Dermatology    Moderate episode of recurrent major depressive disorder ([facility] )     PHQ-9 of 2 today    Hold on medication at this time but I believe he might benefit from Lexapro         Relevant Medications    LORazepam (ATIVAN) 0 5 mg tablet    Other Relevant Orders    CBC and differential    Microalbumin / creatinine urine ratio    Class 2 severe obesity due to excess calories with serious comorbidity and body mass index (BMI) of 39 0 to 39 9 in adult [facility]) Relevant Orders    CBC and differential    TSH, 3rd generation with Free T4 reflex    Generalized anxiety disorder     He has some occasional episodes of significant anxiety with anger  We decided to try lorazepam at this point  I did mention I think a long-term medications such as Lexapro would be more effective  He is not having good relief with bupropion so we decided to stop that at this time  Unfortunately, it did not help him quit smoking either         Relevant Medications    LORazepam (ATIVAN) 0 5 mg tablet    Other Relevant Orders    CBC and differential    TSH, 3rd generation with Free T4 reflex    Hx of colonic polyps    Current every day smoker    Relevant Orders    CBC and differential      Other Visit Diagnoses     Annual physical exam    -  Primary    Prostate cancer screening        Relevant Orders    PSA, Total Screen          Immunizations and preventive care screenings were discussed with patient today  Appropriate education was printed on patient's after visit summary  Counseling:  Dental Health: discussed importance of regular tooth brushing, flossing, and dental visits  · Exercise: the importance of regular exercise/physical activity was discussed  Recommend exercise 3-5 times per week for at least 30 minutes  BMI Counseling: Body mass index is 39 6 kg/m²  The BMI is above normal  Nutrition recommendations include encouraging healthy choices of fruits and vegetables  Exercise recommendations include moderate physical activity 150 minutes/week  Rationale for BMI follow-up plan is due to patient being overweight or obese  Tobacco Cessation Counseling: Tobacco cessation counseling was provided  The patient is sincerely urged to quit consumption of tobacco  He is not ready to quit tobacco  Medication options discussed  No follow-ups on file       Chief Complaint:     Chief Complaint   Patient presents with    Annual Exam      History of Present Illness:     Adult Annual Physical   Patient here for a comprehensive physical exam  The patient reports That he is doing pretty well overall  diabetic control remains excellent with just metformin  He does have some ongoing issues with some anxiety and he is quick to anger  He would like something just to take on occasion  He does not feel bupropion has been helpful for mood or smoking cessation  He does continue smoke about half pack a day  Diet and Physical Activity  · Diet/Nutrition: well balanced diet  · Exercise: moderate cardiovascular exercise  Depression Screening  PHQ-2/9 Depression Screening    Little interest or pleasure in doing things: 1 - several days  Feeling down, depressed, or hopeless: 1 - several days  Trouble falling or staying asleep, or sleeping too much: 0 - not at all  Feeling tired or having little energy: 0 - not at all  Poor appetite or overeatin - not at all  Feeling bad about yourself - or that you are a failure or have let yourself or your family down: 0 - not at all  Trouble concentrating on things, such as reading the newspaper or watching television: 0 - not at all  Moving or speaking so slowly that other people could have noticed  Or the opposite - being so fidgety or restless that you have been moving around a lot more than usual: 0 - not at all  Thoughts that you would be better off dead, or of hurting yourself in some way: 0 - not at all  PHQ-9 Score: 2   PHQ-9 Interpretation: No or Minimal depression        General Health  · Sleep: sleeps well  · Hearing: normal - bilateral   · Vision: goes for regular eye exams  · Dental: regular dental visits   Health  · Symptoms include: none     Review of Systems:     Review of Systems   Constitutional: Negative for appetite change, chills, fatigue, fever and unexpected weight change  HENT: Negative for trouble swallowing  Eyes: Negative for visual disturbance     Respiratory: Negative for cough, chest tightness, shortness of breath and wheezing  Cardiovascular: Negative for chest pain, palpitations and leg swelling  Gastrointestinal: Negative for abdominal distention, abdominal pain, blood in stool, constipation and diarrhea  Endocrine: Negative for polyuria  Genitourinary: Negative for difficulty urinating and flank pain  Musculoskeletal: Negative for arthralgias and myalgias  Skin: Negative for rash  Neurological: Negative for dizziness and light-headedness  Hematological: Negative for adenopathy  Does not bruise/bleed easily  Psychiatric/Behavioral: Positive for dysphoric mood  Negative for sleep disturbance  The patient is not nervous/anxious         Past Medical History:     Past Medical History:   Diagnosis Date    Anxiety     last asessed [id number]    Depression     last assessed 29Jan2013    Hemangioma of liver     Of liver - emnolized 2012;  last assessed 19Aug2014    Hyperlipidemia     Polyp of sigmoid colon 09/01/2012    x 1  9/12      Past Surgical History:     Past Surgical History:   Procedure Laterality Date    CHOLECYSTECTOMY LAPAROSCOPIC      EMBOLIZATION  02/01/2012    Large Hemangioma, 2/12    VENTRAL HERNIA REPAIR      [facility] 00      Family History:     Family History   Problem Relation Age of Onset    Depression Mother     Hodgkin's lymphoma Maternal Uncle       Social History:     Social History     Socioeconomic History    Marital status: Single     Spouse name: None    Number of children: None    Years of education: None    Highest education level: None   Occupational History    None   Tobacco Use    Smoking status: Current Every Day Smoker     Packs/day: 0 50     Types: Cigarettes    Smokeless tobacco: Never Used    Tobacco comment: 8-10 cigs per day   Substance and Sexual Activity    Alcohol use: No    Drug use: Never    Sexual activity: Yes     Partners: Female   Other Topics Concern    None   Social History Narrative    None     Social Determinants of Health     Financial Resource Strain: Not on file   Food Insecurity: Not on file   Transportation Needs: Not on file   Physical Activity: Not on file   Stress: Not on file   Social Connections: Not on file   Intimate Partner Violence: Not on file   Housing Stability: Not on file      Current Medications:     Current Outpatient Medications   Medication Sig Dispense Refill    hydrochlorothiazide (HYDRODIURIL) 25 mg tablet TAKE 1 TABLET BY MOUTH EVERY DAY 90 tablet 3    LORazepam (ATIVAN) 0 5 mg tablet Take 1 tablet (0 5 mg total) by mouth every 8 (eight) hours as needed for anxiety 15 tablet 0    metFORMIN (GLUCOPHAGE) 1000 MG tablet TAKE 1 TABLET BY MOUTH EVERY DAY WITH BREAKFAST 90 tablet 3    Multiple Vitamin (MULTI VITAMIN MENS PO) Take by mouth in the morning       No current facility-administered medications for this visit  Allergies:     No Known Allergies   Physical Exam:     /80   Pulse 70   Ht 6' (1 829 m)   Wt 132 kg (292 lb)   BMI 39 60 kg/m²     Physical Exam  Constitutional:       General: He is not in acute distress  Appearance: Normal appearance  He is well-developed  He is obese  He is not diaphoretic  HENT:      Head: Normocephalic  Right Ear: External ear normal       Left Ear: External ear normal       Nose: Nose normal    Eyes:      General:         Right eye: No discharge  Left eye: No discharge  Conjunctiva/sclera: Conjunctivae normal       Pupils: Pupils are equal, round, and reactive to light  Neck:      Thyroid: No thyromegaly  Trachea: No tracheal deviation  Cardiovascular:      Rate and Rhythm: Normal rate and regular rhythm  Heart sounds: Normal heart sounds  No murmur heard  No friction rub  Pulmonary:      Effort: Pulmonary effort is normal  No respiratory distress  Breath sounds: Normal breath sounds  No wheezing  Chest:      Chest wall: No tenderness  Abdominal:      General: There is no distension  Palpations: There is no mass  Tenderness: There is no abdominal tenderness  There is no guarding or rebound  Hernia: No hernia is present  Musculoskeletal:         General: No swelling or deformity  Cervical back: Normal range of motion  Right lower leg: No edema  Left lower leg: No edema  Skin:     Findings: No erythema or rash  Neurological:      General: No focal deficit present  Mental Status: He is alert  Cranial Nerves: No cranial nerve deficit  Coordination: Coordination normal    Psychiatric:         Thought Content:  Thought content normal           Roxanne Baptiste MD  [address]

## 2022-04-15 NOTE — ASSESSMENT & PLAN NOTE
He has some occasional episodes of significant anxiety with anger  We decided to try lorazepam at this point  I did mention I think a long-term medications such as Lexapro would be more effective  He is not having good relief with bupropion so we decided to stop that at this time    Unfortunately, it did not help him quit smoking either

## 2022-04-15 NOTE — PROGRESS NOTES
Diabetic Foot Exam    Patient's shoes and socks removed  Right Foot/Ankle   Right Foot Inspection  Skin Exam: skin normal and skin intact  No dry skin, no warmth, no callus, no erythema, no maceration, no abnormal color, no pre-ulcer, no ulcer and no callus  Sensory   Vibration: intact  Proprioception: intact  Monofilament testing: intact    Vascular  The right DP pulse is 2+  The right PT pulse is 2+  Left Foot/Ankle  Left Foot Inspection  Skin Exam: skin normal and skin intact  No dry skin, no warmth, no erythema, no maceration, normal color, no pre-ulcer, no ulcer and no callus  Sensory   Vibration: intact  Proprioception: intact  Monofilament testing: intact    Vascular  The left DP pulse is 2+  The left PT pulse is 2+       Assign Risk Category  No deformity present  No loss of protective sensation  No weak pulses  Risk: 0

## 2022-04-18 ENCOUNTER — TELEPHONE (OUTPATIENT)
Dept: ADMINISTRATIVE | Facility: OTHER | Age: 62
End: 2022-04-18

## 2022-04-18 NOTE — TELEPHONE ENCOUNTER
Upon review of the In Basket request and the patient's chart, initial outreach has been made via fax, please see Contacts section for details       Thank you  Benita Parry

## 2022-04-18 NOTE — TELEPHONE ENCOUNTER
Upon review of the In Basket request we were able to locate, review, and update the patient chart as requested for Diabetic Eye Exam     Any additional questions or concerns should be emailed to the Practice Liaisons via [email or web address] email, please do not reply via In Basket      Thank you  Omar Liu

## 2022-04-18 NOTE — LETTER
Diabetic Eye Exam Form    Date Requested: 22  Patient: Andres Palmer  Patient : 1960   Referring Provider: Ryan Velasquez MD    DIABETIC Eye Exam Date _______________________________    Type of Exam MUST be documented for Diabetic Eye Exams  Please CHECK ONE  Retinal Exam       Dilated Retinal Exam       OCT       Optomap-Iris Exam      Fundus Photography     Left Eye - Please check Retinopathy AND Type or No Retinopathy      Exam did show retinopathy    Exam did not show retinopathy         Mild     Proliferative           Moderate    Severe            None         Right Eye - Please check Retinopathy AND Type or No Retinopathy     Exam did show retinopathy    Exam did not show retinopathy         Mild     Proliferative        Moderate    Severe        None       Comments __________________________________________________________    Practice Providing Exam ______________________________________________    Exam Performed By (print name) _______________________________________      Provider Signature ___________________________________________________    These reports are needed for  compliance  Please fax this completed form and a copy of the Diabetic Eye Exam report to our office located at [place] [address] as soon as possible via [phone] [facility]: Phone [phone]  We thank you for your assistance in treating our mutual patient

## 2022-04-18 NOTE — TELEPHONE ENCOUNTER
----- Message from Sav Yo sent at 4/15/2022  1:07 PM EDT -----  Regarding: Eye exam/ [facility]  04/15/22 1:08 PM    Hello, our patient Nehal Regan has had Diabetic Eye Exam completed/performed  Please assist in updating the patient chart by making an External outreach to     Alexia Green MD  Ophthalmology   Phone: [phone];  Fax: [phone]      Thank you,  Ying Avendano MA  [facility] PRIMARY [facility]

## 2022-06-02 DIAGNOSIS — I10 ESSENTIAL HYPERTENSION: ICD-10-CM

## 2022-06-02 RX ORDER — HYDROCHLOROTHIAZIDE 25 MG/1
TABLET ORAL
Qty: 90 TABLET | Refills: 0 | Status: SHIPPED | OUTPATIENT
Start: 2022-06-02 | End: 2022-06-27

## 2022-06-03 NOTE — TELEPHONE ENCOUNTER
Called lmom for pt to go get his blood work done at his local lab and I did advise that the orders were already in his chart

## 2022-06-08 ENCOUNTER — VBI (OUTPATIENT)
Dept: ADMINISTRATIVE | Facility: OTHER | Age: 62
End: 2022-06-08

## 2022-06-27 DIAGNOSIS — I10 ESSENTIAL HYPERTENSION: ICD-10-CM

## 2022-06-27 RX ORDER — HYDROCHLOROTHIAZIDE 25 MG/1
TABLET ORAL
Qty: 30 TABLET | Refills: 0 | Status: SHIPPED | OUTPATIENT
Start: 2022-06-27

## 2022-07-04 DIAGNOSIS — I10 ESSENTIAL HYPERTENSION: ICD-10-CM

## 2022-07-04 RX ORDER — HYDROCHLOROTHIAZIDE 25 MG/1
TABLET ORAL
Qty: 30 TABLET | Refills: 0 | OUTPATIENT
Start: 2022-07-04

## 2022-08-24 LAB
LEFT EYE DIABETIC RETINOPATHY: NORMAL
RIGHT EYE DIABETIC RETINOPATHY: NORMAL

## 2022-09-14 ENCOUNTER — VBI (OUTPATIENT)
Dept: ADMINISTRATIVE | Facility: OTHER | Age: 62
End: 2022-09-14

## 2022-09-27 DIAGNOSIS — I10 ESSENTIAL HYPERTENSION: ICD-10-CM

## 2022-09-27 RX ORDER — HYDROCHLOROTHIAZIDE 25 MG/1
25 TABLET ORAL DAILY
Qty: 90 TABLET | Refills: 3 | Status: SHIPPED | OUTPATIENT
Start: 2022-09-27

## 2022-11-13 DIAGNOSIS — E11.9 TYPE 2 DIABETES MELLITUS WITHOUT COMPLICATION, WITHOUT LONG-TERM CURRENT USE OF INSULIN (HCC): ICD-10-CM

## 2023-02-20 ENCOUNTER — RA CDI HCC (OUTPATIENT)
Dept: OTHER | Facility: HOSPITAL | Age: 63
End: 2023-02-20

## 2023-02-20 NOTE — PROGRESS NOTES
[facility]  coding opportunities       Chart reviewed, no opportunity found: CHART REVIEWED, NO OPPORTUNITY FOUND      This is a reminder to address ALL HCC (risk adjustment) codes as found on active problem list for 2023 as patient scores reset to zero EMMETT      Patients Insurance        Commercial Insurance: [address] [place] [address]

## 2023-02-26 NOTE — ASSESSMENT & PLAN NOTE
Lab Results   Component Value Date    HGBA1C 8 6 (H) 01/20/2020   He has already lost a significant amount of weight which will help  I would like him to try metformin 1000 mg once daily, which we can titrate up to 1000 mg twice daily as needed  Continue to work on weight loss  Undergo diabetic education which will be extremely valuable for him  Referral has already been placed  Follow-up here in 3-4 months with lab work prior to his visit  (4) Less than 3 years old

## 2023-02-27 ENCOUNTER — OFFICE VISIT (OUTPATIENT)
Dept: FAMILY MEDICINE CLINIC | Facility: CLINIC | Age: 63
End: 2023-02-27

## 2023-02-27 VITALS
SYSTOLIC BLOOD PRESSURE: 132 MMHG | WEIGHT: 297 LBS | DIASTOLIC BLOOD PRESSURE: 82 MMHG | OXYGEN SATURATION: 99 % | TEMPERATURE: 97.9 F | BODY MASS INDEX: 40.28 KG/M2 | HEART RATE: 72 BPM

## 2023-02-27 DIAGNOSIS — F17.200 CURRENT EVERY DAY SMOKER: ICD-10-CM

## 2023-02-27 DIAGNOSIS — F33.1 MODERATE EPISODE OF RECURRENT MAJOR DEPRESSIVE DISORDER (HCC): ICD-10-CM

## 2023-02-27 DIAGNOSIS — R22.0 CHEEK MASS: ICD-10-CM

## 2023-02-27 DIAGNOSIS — E66.01 CLASS 3 SEVERE OBESITY DUE TO EXCESS CALORIES WITH SERIOUS COMORBIDITY AND BODY MASS INDEX (BMI) OF 40.0 TO 44.9 IN ADULT (HCC): ICD-10-CM

## 2023-02-27 DIAGNOSIS — D72.829 LEUKOCYTOSIS, UNSPECIFIED TYPE: ICD-10-CM

## 2023-02-27 DIAGNOSIS — E11.9 TYPE 2 DIABETES MELLITUS WITHOUT COMPLICATION, WITHOUT LONG-TERM CURRENT USE OF INSULIN (HCC): Primary | ICD-10-CM

## 2023-02-27 DIAGNOSIS — I10 ESSENTIAL HYPERTENSION: ICD-10-CM

## 2023-02-27 DIAGNOSIS — F41.1 GENERALIZED ANXIETY DISORDER: ICD-10-CM

## 2023-02-27 PROBLEM — E66.813 CLASS 3 SEVERE OBESITY DUE TO EXCESS CALORIES WITH SERIOUS COMORBIDITY AND BODY MASS INDEX (BMI) OF 40.0 TO 44.9 IN ADULT (HCC): Status: ACTIVE | Noted: 2017-07-21

## 2023-02-27 LAB — SL AMB POCT HEMOGLOBIN AIC: 5.3 (ref ?–6.5)

## 2023-02-27 NOTE — ASSESSMENT & PLAN NOTE
Based on my thorough discussion with the patient today  He is under 20 pack years as he has been a half a pack a day smoker for about 20 years and only smoked intermittently prior to that  He was, of course, counseled on quitting smoking

## 2023-02-27 NOTE — ASSESSMENT & PLAN NOTE
He feels he is doing well without any need for medication at this time    He very rarely takes lorazepam

## 2023-02-27 NOTE — ASSESSMENT & PLAN NOTE
There is certainly a situational component to his depressed mood  He recently lost a very close friend  Is resistant to medication at this time but certainly may benefit from an SSRI  Bupropion was not very effective in the past as he did have some breakthrough anxiety and it did not really help him quit smoking

## 2023-02-27 NOTE — PROGRESS NOTES
Assessment/Plan:       Problem List Items Addressed This Visit        Endocrine    Type 2 diabetes mellitus without complication, without long-term current use of insulin ([facility] ) - Primary    Relevant Orders    POCT hemoglobin A1c (Completed)    Comprehensive metabolic panel    CBC and differential    Comprehensive metabolic panel    Hemoglobin A1C    Microalbumin / creatinine urine ratio       Cardiovascular and Mediastinum    Essential hypertension    Relevant Orders    Comprehensive metabolic panel    CBC and differential    Lipid Panel with Direct LDL reflex    CBC and differential    Comprehensive metabolic panel    Lipid Panel with Direct LDL reflex       Other    Moderate episode of recurrent major depressive disorder (HCC)     There is certainly a situational component to his depressed mood  He recently lost a very close friend  Is resistant to medication at this time but certainly may benefit from an SSRI  Bupropion was not very effective in the past as he did have some breakthrough anxiety and it did not really help him quit smoking  Relevant Orders    CBC and differential    Class 3 severe obesity due to excess calories with serious comorbidity and body mass index (BMI) of 40 0 to 44 9 in [facility])     Continue with routine exercise  Generalized anxiety disorder     He feels he is doing well without any need for medication at this time  He very rarely takes lorazepam          Relevant Orders    CBC and differential    Comprehensive metabolic panel    Current every day smoker     Based on my thorough discussion with the patient today  He is under 20 pack years as he has been a half a pack a day smoker for about 20 years and only smoked intermittently prior to that  He was, of course, counseled on quitting smoking           Relevant Orders    CBC and differential    Cheek mass    Relevant Orders    Ambulatory Referral to Otolaryngology    Leukocytosis     Labs back in June showed a elevated white blood cell count  Repeat CBC  Relevant Orders    CBC and differential    CBC and differential    Comprehensive metabolic panel         Subjective:      Patient ID: Rebecca Mlyes is a 58 y o  male  HPI patient today for follow-up for chronic health issues  He has no acute complaints  He does note a mass on his left cheek has been present for many years seems to have enlarged  He notes he has had this "drained" in the past for some pus  He has history of chronic anxiety  He very rarely takes lorazepam   He feels he has "always been this way"  He did lose a close friend recently and does feel somewhat depressed but feels this is transient based on her loss  The following portions of the patient's history were reviewed and updated as appropriate: allergies, current medications, past family history, past medical history, past social history, past surgical history and problem list       Current Outpatient Medications:   •  hydrochlorothiazide (HYDRODIURIL) 25 mg tablet, Take 1 tablet (25 mg total) by mouth daily, Disp: 90 tablet, Rfl: 3  •  LORazepam (ATIVAN) 0 5 mg tablet, Take 1 tablet (0 5 mg total) by mouth every 8 (eight) hours as needed for anxiety, Disp: 15 tablet, Rfl: 0  •  metFORMIN (GLUCOPHAGE) 1000 MG tablet, TAKE 1 TABLET BY MOUTH EVERY DAY WITH BREAKFAST, Disp: 90 tablet, Rfl: 3  •  Multiple Vitamin (MULTI VITAMIN MENS PO), Take by mouth in the morning, Disp: , Rfl:      Review of Systems   Constitutional: Negative for fatigue  HENT: Negative for trouble swallowing  Respiratory: Negative for chest tightness, shortness of breath and wheezing  Cardiovascular: Negative for chest pain, palpitations and leg swelling  Gastrointestinal: Negative for abdominal pain, constipation and diarrhea  Endocrine: Negative for polyuria  Genitourinary: Negative for difficulty urinating and flank pain  Musculoskeletal: Negative for arthralgias and myalgias     Skin: Negative for rash  Psychiatric/Behavioral: Negative for sleep disturbance  Objective:      /82 (BP Location: Left arm, Patient Position: Sitting, Cuff Size: Large)   Pulse 72   Temp 97 9 °F (36 6 °C) (Tympanic)   Wt 135 kg (297 lb)   SpO2 99%   BMI 40 28 kg/m²          Physical Exam  Vitals reviewed  Constitutional:       Appearance: He is well-developed  He is obese  HENT:      Head: Normocephalic  Cardiovascular:      Rate and Rhythm: Regular rhythm  Heart sounds: Normal heart sounds  No murmur heard  Pulmonary:      Effort: No respiratory distress  Breath sounds: No wheezing or rales  Abdominal:      General: There is no distension  Tenderness: There is no abdominal tenderness  Skin:     Findings: No erythema or rash  Neurological:      Mental Status: He is alert and oriented to person, place, and time             Yovani Zimmerman MD

## 2023-10-18 DIAGNOSIS — E11.9 TYPE 2 DIABETES MELLITUS WITHOUT COMPLICATION, WITHOUT LONG-TERM CURRENT USE OF INSULIN (HCC): ICD-10-CM

## 2023-11-10 NOTE — ASSESSMENT & PLAN NOTE
----- Message from Carin Sage sent at 11/9/2023 12:45 PM EST -----  Subject: Medication Problem    Medication: Semaglutide,0.25 or 0.5MG/DOS, 2 MG/3ML SOPN  Dosage: .5 mg / 1 per week   Ordering Provider: geoffrey    Question/Problem: Patient stated medication is on back order and they are   unsure when the medication will be avail. Patient would like to know if   there are any samples avail in office? Patient want to know should she try   another pharmacy?       Pharmacy: [address] [phone]    ---------------------------------------------------------------------------  --------------  Allyssa MARTIN  [phone]; OK to leave message on voicemail  ---------------------------------------------------------------------------  --------------    SCRIPT ANSWERS  Relationship to Patient: Self He should follow-up with Dermatology    He has a relatively benign looking lesion on his nose on the left but I would like this evaluated

## 2024-01-09 ENCOUNTER — TELEPHONE (OUTPATIENT)
Dept: PSYCHIATRY | Facility: CLINIC | Age: 64
End: 2024-01-09

## 2024-01-09 ENCOUNTER — OFFICE VISIT (OUTPATIENT)
Dept: FAMILY MEDICINE CLINIC | Facility: CLINIC | Age: 64
End: 2024-01-09
Payer: COMMERCIAL

## 2024-01-09 VITALS
HEART RATE: 66 BPM | HEIGHT: 72 IN | DIASTOLIC BLOOD PRESSURE: 84 MMHG | WEIGHT: 274.6 LBS | OXYGEN SATURATION: 98 % | BODY MASS INDEX: 37.19 KG/M2 | SYSTOLIC BLOOD PRESSURE: 132 MMHG

## 2024-01-09 DIAGNOSIS — E11.9 TYPE 2 DIABETES MELLITUS WITHOUT COMPLICATION, WITHOUT LONG-TERM CURRENT USE OF INSULIN (HCC): ICD-10-CM

## 2024-01-09 DIAGNOSIS — Z00.00 ANNUAL PHYSICAL EXAM: Primary | ICD-10-CM

## 2024-01-09 DIAGNOSIS — F33.1 MODERATE EPISODE OF RECURRENT MAJOR DEPRESSIVE DISORDER (HCC): ICD-10-CM

## 2024-01-09 LAB — SL AMB POCT HEMOGLOBIN AIC: 5.2 (ref ?–6.5)

## 2024-01-09 PROCEDURE — 83036 HEMOGLOBIN GLYCOSYLATED A1C: CPT | Performed by: FAMILY MEDICINE

## 2024-01-09 PROCEDURE — 99396 PREV VISIT EST AGE 40-64: CPT | Performed by: FAMILY MEDICINE

## 2024-01-09 RX ORDER — ESCITALOPRAM OXALATE 10 MG/1
10 TABLET ORAL DAILY
Qty: 30 TABLET | Refills: 1 | Status: SHIPPED | OUTPATIENT
Start: 2024-01-09 | End: 2024-07-07

## 2024-01-09 NOTE — PROGRESS NOTES
ADULT ANNUAL PHYSICAL  [facility] PRIMARY CARE    NAME: Brando Martinez  AGE: 63 y.o. SEX: male  : 1960     DATE: 2024     Assessment and Plan:     Problem List Items Addressed This Visit          Endocrine    Type 2 diabetes mellitus without complication, without long-term current use of insulin (HCC)     Remains controlled has pending labs, worried about copay   Lab Results   Component Value Date    HGBA1C 5.2 2024            Relevant Orders    POCT hemoglobin A1c (Completed)       Other    Moderate episode of recurrent major depressive disorder (HCC)     Start lexapro and refer to psychiatry, unfortunately he has gone through a lot recently, lost multiple partners, lost job all within last year, has daugher but she lives out of the area, patient feels as if he struggles with modern world/technology/computers and this limits what he can do for work and socially.  States he would never harm himself.    Also having financial issues, affording necessaties and medical care, will refer to social work as well.         Relevant Medications    escitalopram (LEXAPRO) 10 mg tablet    Other Relevant Orders    Ambulatory Referral to Social Work Care Management Program    Ambulatory referral to Psych Services     Other Visit Diagnoses       Annual physical exam    -  Primary          Declines screening tests today, colonscopy and PSA, will discuss in future.    Immunizations and preventive care screenings were discussed with patient today. Appropriate education was printed on patient's after visit summary.    Discussed risks and benefits of prostate cancer screening. We discussed the controversial history of PSA screening for prostate cancer in the [place] as well as the risk of over detection and over treatment of prostate cancer by way of PSA screening.  The patient understands that PSA blood testing is an imperfect way to screen for prostate cancer  and that elevated PSA levels in the blood may also be caused by infection, inflammation, prostatic trauma or manipulation, urological procedures, or by benign prostatic enlargement.    The role of the digital rectal examination in prostate cancer screening was also discussed and I discussed with him that there is large interobserver variability in the findings of digital rectal examination.    Counseling:  Alcohol/drug use: discussed moderation in alcohol intake, the recommendations for healthy alcohol use, and avoidance of illicit drug use.  Dental Health: discussed importance of regular tooth brushing, flossing, and dental visits.  Injury prevention: discussed safety/seat belts, safety helmets, smoke detectors, carbon dioxide detectors, and smoking near bedding or upholstery.  Sexual health: discussed sexually transmitted diseases, partner selection, use of condoms, avoidance of unintended pregnancy, and contraceptive alternatives.  Exercise: the importance of regular exercise/physical activity was discussed. Recommend exercise 3-5 times per week for at least 30 minutes.          Return in about 6 weeks (around 2024).     Chief Complaint:     Chief Complaint   Patient presents with    Physical Exam    Anxiety      History of Present Illness:     Adult Annual Physical   Patient here for a comprehensive physical exam. The patient reports problems -   .    Diet and Physical Activity  Diet/Nutrition: well balanced diet.   Exercise: walking.      Depression Screening  PHQ-2/9 Depression Screening    Little interest or pleasure in doing things: 1 - several days  Feeling down, depressed, or hopeless: 2 - more than half the days  Trouble falling or staying asleep, or sleeping too much: 2 - more than half the days  Feeling tired or having little energy: 2 - more than half the days  Poor appetite or overeatin - not at all  Feeling bad about yourself - or that you are a failure or have let yourself or your family  down: 3 - nearly every day  Trouble concentrating on things, such as reading the newspaper or watching television: 3 - nearly every day  Moving or speaking so slowly that other people could have noticed. Or the opposite - being so fidgety or restless that you have been moving around a lot more than usual: 0 - not at all  Thoughts that you would be better off dead, or of hurting yourself in some way: 3 - nearly every day  PHQ-9 Score: 16  PHQ-9 Interpretation: Moderately severe depression       General Health  Sleep: sleeps poorly.   Hearing: normal - bilateral.  Vision: no vision problems.      Review of Systems:     Review of Systems   Constitutional:  Negative for appetite change.   Respiratory:  Negative for apnea and chest tightness.    Gastrointestinal:  Negative for abdominal distention and abdominal pain.   Musculoskeletal:  Negative for arthralgias and back pain.   Psychiatric/Behavioral:  Positive for agitation, decreased concentration and sleep disturbance. Negative for self-injury and suicidal ideas. The patient is nervous/anxious.       Past Medical History:     Past Medical History:   Diagnosis Date    Anxiety     last asessed 06Nov2012    Depression     last assessed 29Jan2013    Hemangioma of liver     Of liver - emnolized 2012;  last assessed 19Aug2014    Hyperlipidemia     Polyp of sigmoid colon 09/01/2012    x 1  9/12      Past Surgical History:     Past Surgical History:   Procedure Laterality Date    CHOLECYSTECTOMY LAPAROSCOPIC      EMBOLIZATION  02/01/2012    Large Hemangioma, 2/12    VENTRAL HERNIA REPAIR      Ventral 00      Family History:     Family History   Problem Relation Age of Onset    Depression Mother     Hodgkin's lymphoma Maternal Uncle       Social History:     Social History     Socioeconomic History    Marital status: Single     Spouse name: None    Number of children: None    Years of education: None    Highest education level: None   Occupational History    None   Tobacco Use     Smoking status: Every Day     Current packs/day: 0.50     Average packs/day: 0.5 packs/day for 20.0 years (10.0 ttl pk-yrs)     Types: Cigarettes     Start date: 2004    Smokeless tobacco: Never    Tobacco comments:     8-10 cigs per day   Vaping Use    Vaping status: Never Used   Substance and Sexual Activity    Alcohol use: No    Drug use: Never    Sexual activity: Yes     Partners: Female   Other Topics Concern    None   Social History Narrative    None     Social Determinants of Health     Financial Resource Strain: Not on file   Food Insecurity: Not on file   Transportation Needs: Not on file   Physical Activity: Not on file   Stress: Not on file   Social Connections: Not on file   Intimate Partner Violence: Not on file   Housing Stability: Not on file      Current Medications:     Current Outpatient Medications   Medication Sig Dispense Refill    escitalopram (LEXAPRO) 10 mg tablet Take 1 tablet (10 mg total) by mouth daily 30 tablet 1    hydrochlorothiazide (HYDRODIURIL) 25 mg tablet Take 1 tablet (25 mg total) by mouth daily 90 tablet 3    LORazepam (ATIVAN) 0.5 mg tablet Take 1 tablet (0.5 mg total) by mouth every 8 (eight) hours as needed for anxiety 15 tablet 0    metFORMIN (GLUCOPHAGE) 1000 MG tablet TAKE 1 TABLET BY MOUTH EVERY DAY WITH BREAKFAST 90 tablet 3    Multiple Vitamin (MULTI VITAMIN MENS PO) Take by mouth in the morning       No current facility-administered medications for this visit.      Allergies:     No Known Allergies   Physical Exam:     /84 (BP Location: Left arm, Patient Position: Sitting, Cuff Size: Large)   Pulse 66   Ht 6' (1.829 m)   Wt 125 kg (274 lb 9.6 oz)   SpO2 98%   BMI 37.24 kg/m²     Physical Exam  Constitutional:       Appearance: Normal appearance.   Cardiovascular:      Rate and Rhythm: Normal rate and regular rhythm.      Pulses: Normal pulses. no weak pulses          Dorsalis pedis pulses are 2+ on the right side and 2+ on the left side.        Posterior  tibial pulses are 2+ on the right side and 2+ on the left side.      Heart sounds: Normal heart sounds.   Pulmonary:      Effort: Pulmonary effort is normal.      Breath sounds: Normal breath sounds.   Feet:      Right foot:      Skin integrity: No ulcer, skin breakdown, erythema, warmth, callus or dry skin.      Left foot:      Skin integrity: No ulcer, skin breakdown, erythema, warmth, callus or dry skin.   Skin:     Comments: Lipoma on forehead   Neurological:      General: No focal deficit present.      Mental Status: He is alert and oriented to person, place, and time.          Ken Vaughan MD  [facility] PRIMARY CARE  Diabetic Foot Exam    Patient's shoes and socks removed.    Right Foot/Ankle   Right Foot Inspection  Skin Exam: skin normal and skin intact. No dry skin, no warmth, no callus, no erythema, no maceration, no abnormal color, no pre-ulcer, no ulcer and no callus.     Toe Exam: ROM and strength within normal limits.     Sensory   Monofilament testing: intact    Vascular  Capillary refills: < 3 seconds  The right DP pulse is 2+. The right PT pulse is 2+.     Left Foot/Ankle  Left Foot Inspection  Skin Exam: skin normal and skin intact. No dry skin, no warmth, no erythema, no maceration, normal color, no pre-ulcer, no ulcer and no callus.     Toe Exam: ROM and strength within normal limits.     Sensory   Monofilament testing: intact    Vascular  Capillary refills: < 3 seconds  The left DP pulse is 2+. The left PT pulse is 2+.     Assign Risk Category  No deformity present  No loss of protective sensation  No weak pulses  Risk: 0

## 2024-01-09 NOTE — TELEPHONE ENCOUNTER
Called pt in regards to referral rcvd, verify needs of services and inform of current wait list. No answer, lvm for pt to call back.

## 2024-01-09 NOTE — PATIENT INSTRUCTIONS
1. Moderate episode of recurrent major depressive disorder (HCC)  -     Ambulatory Referral to Social Work Care Management Program; Future  -     Ambulatory referral to Psych Services; Future    2. Annual physical exam    3. Type 2 diabetes mellitus without complication, without long-term current use of insulin (HCC)  -     POCT hemoglobin A1c

## 2024-01-09 NOTE — ASSESSMENT & PLAN NOTE
Remains controlled has pending labs, worried about copay   Lab Results   Component Value Date    HGBA1C 5.2 01/09/2024

## 2024-01-09 NOTE — ASSESSMENT & PLAN NOTE
Start lexapro and refer to psychiatry, unfortunately he has gone through a lot recently, lost multiple partners, lost job all within last year, has daugher but she lives out of the area, patient feels as if he struggles with modern world/technology/computers and this limits what he can do for work and socially.  States he would never harm himself.    Also having financial issues, affording necessaties and medical care, will refer to social work as well.

## 2024-01-10 ENCOUNTER — PATIENT OUTREACH (OUTPATIENT)
Dept: FAMILY MEDICINE CLINIC | Facility: CLINIC | Age: 64
End: 2024-01-10
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
## 2024-01-12 ENCOUNTER — PATIENT OUTREACH (OUTPATIENT)
Dept: FAMILY MEDICINE CLINIC | Facility: CLINIC | Age: 64
End: 2024-01-12

## 2024-01-12 NOTE — PROGRESS NOTES
SERA SCOTT left message with Bren Choi Psy.D regarding patient interested in starting therapy and needing sliding fee scale self pay rate. SERA SCOTT will continue to remain available for psychosocial support as needed.

## 2024-01-15 ENCOUNTER — TELEPHONE (OUTPATIENT)
Dept: FAMILY MEDICINE CLINIC | Facility: CLINIC | Age: 64
End: 2024-01-15

## 2024-01-15 ENCOUNTER — PATIENT OUTREACH (OUTPATIENT)
Dept: FAMILY MEDICINE CLINIC | Facility: CLINIC | Age: 64
End: 2024-01-15

## 2024-01-15 NOTE — PROGRESS NOTES
SERA SCOTT placed follow up call to the [facility] and spoke with Emile who will have someone call SERA SCOTT back with an update.     Later, SERA SCOTT received update that they spoke with the patient on 1/11. He had spoken with . He had passive SI. He does not have a plan or intent to kill self. He is considered a low risk. They discussed support and community resources. They spoke to him again and he was receptive to help.     SERA SCOTT will continue to remain available for psychosocial support as needed.

## 2024-01-16 ENCOUNTER — PATIENT OUTREACH (OUTPATIENT)
Dept: FAMILY MEDICINE CLINIC | Facility: CLINIC | Age: 64
End: 2024-01-16

## 2024-01-16 NOTE — PROGRESS NOTES
SERA SCOTT placed second call to Bren Choi Psy.D regarding patient interested in therapy at self-pay rate.     SERA SCOTT then placed follow up call to the patient, Brando and dana simmons.    SERA SCOTT will continue to remain available for psychosocial support as needed.

## 2024-01-19 ENCOUNTER — PATIENT OUTREACH (OUTPATIENT)
Dept: FAMILY MEDICINE CLINIC | Facility: CLINIC | Age: 64
End: 2024-01-19

## 2024-01-19 NOTE — PROGRESS NOTES
SERA SCOTT received returned call from Bren Choi Psy.D stating she has availability for self-pay client. SERA SCOTT placed additional call and left message with patient's name and contact information.     SERA SCOTT will continue to remain available for psychosocial support as needed.

## 2024-01-26 ENCOUNTER — PATIENT OUTREACH (OUTPATIENT)
Dept: FAMILY MEDICINE CLINIC | Facility: CLINIC | Age: 64
End: 2024-01-26

## 2024-01-26 NOTE — LETTER
01/26/24    Dear Brando Martinez,    I am a Care Manager with [address]  [facility] [address] [phone].  We have made several attempts to call you by phone.  It is important that you contact us back at [phone] so that we can assist with your care needs.     Sincerely,         Marlys Brooks  Social Work Care Manager

## 2024-01-26 NOTE — PROGRESS NOTES
SERA SCOTT placed additional call to the patient, Brando and left message. SERA SCOTT sent Unable to Reach letter and will remain available for psychosocial support as needed.

## 2024-02-13 ENCOUNTER — PATIENT OUTREACH (OUTPATIENT)
Dept: FAMILY MEDICINE CLINIC | Facility: CLINIC | Age: 64
End: 2024-02-13

## 2024-02-13 NOTE — PROGRESS NOTES
SERA CM returned missed call from the patient, Brando and left message.    SERA CM will continue to remain available for psychosocial support as needed.

## 2024-02-14 ENCOUNTER — RA CDI HCC (OUTPATIENT)
Dept: OTHER | Facility: HOSPITAL | Age: 64
End: 2024-02-14

## 2024-02-14 NOTE — PROGRESS NOTES
HCC coding opportunities          Chart Reviewed number of suggestions sent to Provider: 1   F33.1    This is a reminder to address (resolve/update/assess) ALL HCC (risk adjustment) codes as found on active problem list for 2024 as patient scores reset to zero EMMETT.  Also, just a reminder to please review and assess all other chronic conditions for 2024  Patients Insurance        Commercial Insurance: [employer] Commercial [employer]

## 2024-02-21 ENCOUNTER — OFFICE VISIT (OUTPATIENT)
Dept: FAMILY MEDICINE CLINIC | Facility: CLINIC | Age: 64
End: 2024-02-21
Payer: COMMERCIAL

## 2024-02-21 VITALS
OXYGEN SATURATION: 97 % | RESPIRATION RATE: 18 BRPM | TEMPERATURE: 98.6 F | HEART RATE: 70 BPM | WEIGHT: 285 LBS | SYSTOLIC BLOOD PRESSURE: 140 MMHG | DIASTOLIC BLOOD PRESSURE: 82 MMHG | BODY MASS INDEX: 38.65 KG/M2

## 2024-02-21 DIAGNOSIS — F41.9 ANXIETY: Primary | ICD-10-CM

## 2024-02-21 DIAGNOSIS — F33.1 MODERATE EPISODE OF RECURRENT MAJOR DEPRESSIVE DISORDER (HCC): ICD-10-CM

## 2024-02-21 PROCEDURE — 99214 OFFICE O/P EST MOD 30 MIN: CPT | Performed by: FAMILY MEDICINE

## 2024-02-21 RX ORDER — HYDROXYZINE HYDROCHLORIDE 25 MG/1
25 TABLET, FILM COATED ORAL DAILY PRN
Qty: 30 TABLET | Refills: 1 | Status: SHIPPED | OUTPATIENT
Start: 2024-02-21

## 2024-02-21 RX ORDER — HYDROXYZINE HYDROCHLORIDE 25 MG/1
25 TABLET, FILM COATED ORAL DAILY PRN
Start: 2024-02-21 | End: 2024-02-21

## 2024-02-21 RX ORDER — HYDROXYZINE HYDROCHLORIDE 25 MG/1
25 TABLET, FILM COATED ORAL
Qty: 30 TABLET | Refills: 1 | Status: SHIPPED | OUTPATIENT
Start: 2024-02-21 | End: 2024-02-21

## 2024-02-21 NOTE — PROGRESS NOTES
Assessment/Plan:       Problem List Items Addressed This Visit          Other    Moderate episode of recurrent major depressive disorder (HCC)    Relevant Medications    hydrOXYzine HCL (ATARAX) 25 mg tablet     Other Visit Diagnoses       Anxiety    -  Primary    Relevant Medications    hydrOXYzine HCL (ATARAX) 25 mg tablet            1. Moderate episode of recurrent major depressive disorder (HCC)  Declines any medicine that he would need every day, will use hydroxzyine as needed, continue talking to , did try to start therapy but not affordable, recommended 3 month follow up patient elects to do 6 months.  No SI states he would never do that as he wants to be there for his daughter.    - hydrOXYzine HCL (ATARAX) 25 mg tablet; Take 1 tablet (25 mg total) by mouth daily as needed for anxiety  Dispense: 30 tablet; Refill: 1    2. Anxiety    - hydrOXYzine HCL (ATARAX) 25 mg tablet; Take 1 tablet (25 mg total) by mouth daily as needed for anxiety  Dispense: 30 tablet; Refill: 1    Subjective:      Patient ID: Brando Martinez is a 63 y.o. male.    HPI    63 year old presenting in follow up, couldn't afford psychology, has been talking to  which he does find helpful    Having side effects of lexapro, has stopped.    Feels as if it made him jittery otherwise didn't help, has used bupropion in past and had side effects with this as well.    Since last visit his ex, daughters mother passed away unexpectedly during hip replacement.    Having trouble coping.     The following portions of the patient's history were reviewed and updated as appropriate: allergies, current medications, past family history, past medical history, past social history, past surgical history and problem list.      Current Outpatient Medications:     hydrochlorothiazide (HYDRODIURIL) 25 mg tablet, Take 1 tablet (25 mg total) by mouth daily, Disp: 90 tablet, Rfl: 3    hydrOXYzine HCL (ATARAX) 25 mg tablet, Take 1 tablet (25 mg total) by  mouth daily as needed for anxiety, Disp: 30 tablet, Rfl: 1    LORazepam (ATIVAN) 0.5 mg tablet, Take 1 tablet (0.5 mg total) by mouth every 8 (eight) hours as needed for anxiety, Disp: 15 tablet, Rfl: 0    metFORMIN (GLUCOPHAGE) 1000 MG tablet, TAKE 1 TABLET BY MOUTH EVERY DAY WITH BREAKFAST, Disp: 90 tablet, Rfl: 3    Multiple Vitamin (MULTI VITAMIN MENS PO), Take by mouth in the morning, Disp: , Rfl:      Review of Systems   Constitutional:  Positive for appetite change. Negative for activity change.   Respiratory:  Negative for apnea and chest tightness.    Cardiovascular:  Negative for chest pain and palpitations.   Gastrointestinal:  Negative for abdominal distention and abdominal pain.   Psychiatric/Behavioral:  Positive for sleep disturbance. Negative for self-injury and suicidal ideas. The patient is nervous/anxious and is hyperactive.          Objective:      /82 (BP Location: Left arm, Patient Position: Sitting, Cuff Size: Large)   Pulse 70   Temp 98.6 °F (37 °C) (Tympanic)   Resp 18   Wt 129 kg (285 lb)   SpO2 97%   BMI 38.65 kg/m²          Physical Exam  Constitutional:       Appearance: Normal appearance.   Cardiovascular:      Rate and Rhythm: Normal rate and regular rhythm.   Pulmonary:      Effort: Pulmonary effort is normal.      Breath sounds: Normal breath sounds.   Neurological:      Mental Status: He is alert.   Psychiatric:         Mood and Affect: Mood is anxious.         Speech: Speech normal.         Cognition and Memory: Cognition normal.           Ken Vaughan MD

## 2024-02-21 NOTE — PATIENT INSTRUCTIONS
1. Anxiety  -     hydrOXYzine HCL (ATARAX) 25 mg tablet; Take 1 tablet (25 mg total) by mouth daily as needed for anxiety    2. Moderate episode of recurrent major depressive disorder (HCC)  -     hydrOXYzine HCL (ATARAX) 25 mg tablet; Take 1 tablet (25 mg total) by mouth daily as needed for anxiety

## 2024-02-26 ENCOUNTER — PATIENT OUTREACH (OUTPATIENT)
Dept: FAMILY MEDICINE CLINIC | Facility: CLINIC | Age: 64
End: 2024-02-26

## 2024-02-26 NOTE — PROGRESS NOTES
SERA SCOTT noted in chart that patient reported inability to afford mental health services. Denied SI at last office visit.    SERA CM placed additional call to the patient, Brando and left message. SERA CM will close referral due to lost communication. SERA  had provided sliding fee scale therapists and return to work programs. SERA CM will remain available for psychosocial support as needed.

## 2024-11-25 ENCOUNTER — APPOINTMENT (EMERGENCY)
Dept: RADIOLOGY | Facility: HOSPITAL | Age: 64
DRG: 201 | End: 2024-11-25
Payer: COMMERCIAL

## 2024-11-25 ENCOUNTER — OFFICE VISIT (OUTPATIENT)
Dept: FAMILY MEDICINE CLINIC | Facility: CLINIC | Age: 64
End: 2024-11-25
Payer: COMMERCIAL

## 2024-11-25 ENCOUNTER — APPOINTMENT (INPATIENT)
Dept: NON INVASIVE DIAGNOSTICS | Facility: HOSPITAL | Age: 64
DRG: 201 | End: 2024-11-25
Payer: COMMERCIAL

## 2024-11-25 ENCOUNTER — HOSPITAL ENCOUNTER (INPATIENT)
Facility: HOSPITAL | Age: 64
LOS: 3 days | Discharge: HOME | DRG: 201 | End: 2024-11-28
Attending: EMERGENCY MEDICINE | Admitting: INTERNAL MEDICINE
Payer: COMMERCIAL

## 2024-11-25 VITALS
SYSTOLIC BLOOD PRESSURE: 110 MMHG | DIASTOLIC BLOOD PRESSURE: 60 MMHG | TEMPERATURE: 97.3 F | RESPIRATION RATE: 18 BRPM | WEIGHT: 315 LBS | HEIGHT: 72 IN | HEART RATE: 130 BPM | OXYGEN SATURATION: 98 % | BODY MASS INDEX: 42.66 KG/M2

## 2024-11-25 DIAGNOSIS — I48.91 NEW ONSET ATRIAL FIBRILLATION (HCC): ICD-10-CM

## 2024-11-25 DIAGNOSIS — E11.9 TYPE 2 DIABETES MELLITUS WITHOUT COMPLICATION, WITHOUT LONG-TERM CURRENT USE OF INSULIN (HCC): ICD-10-CM

## 2024-11-25 DIAGNOSIS — D72.829 LEUKOCYTOSIS, UNSPECIFIED TYPE: ICD-10-CM

## 2024-11-25 DIAGNOSIS — R06.02 SHORTNESS OF BREATH: Primary | ICD-10-CM

## 2024-11-25 DIAGNOSIS — I48.91 ATRIAL FIBRILLATION (HCC): Primary | ICD-10-CM

## 2024-11-25 DIAGNOSIS — I48.21 PERMANENT ATRIAL FIBRILLATION (HCC): ICD-10-CM

## 2024-11-25 PROBLEM — E66.01 MORBID OBESITY WITH BMI OF 40.0-44.9, ADULT (HCC): Status: ACTIVE | Noted: 2024-11-25

## 2024-11-25 LAB
2HR DELTA HS TROPONIN: -1 NG/L
ALBUMIN SERPL BCG-MCNC: 4.7 G/DL (ref 3.5–5)
ALP SERPL-CCNC: 71 U/L (ref 34–104)
ALT SERPL W P-5'-P-CCNC: 28 U/L (ref 7–52)
ANION GAP SERPL CALCULATED.3IONS-SCNC: 7 MMOL/L (ref 4–13)
ANISOCYTOSIS BLD QL SMEAR: PRESENT
AORTIC ROOT: 4 CM
APICAL FOUR CHAMBER EJECTION FRACTION: 63 %
APTT PPP: 30 SECONDS (ref 23–34)
ASCENDING AORTA: 4.6 CM
AST SERPL W P-5'-P-CCNC: 19 U/L (ref 13–39)
ATRIAL RATE: 202 BPM
BASOPHILS # BLD MANUAL: 0 THOUSAND/UL (ref 0–0.1)
BASOPHILS NFR MAR MANUAL: 0 % (ref 0–1)
BILIRUB SERPL-MCNC: 2.02 MG/DL (ref 0.2–1)
BNP SERPL-MCNC: 289 PG/ML (ref 0–100)
BSA FOR ECHO PROCEDURE: 2.62 M2
BUN SERPL-MCNC: 18 MG/DL (ref 5–25)
CALCIUM SERPL-MCNC: 10.3 MG/DL (ref 8.4–10.2)
CARDIAC TROPONIN I PNL SERPL HS: 7 NG/L (ref ?–50)
CARDIAC TROPONIN I PNL SERPL HS: 8 NG/L (ref ?–50)
CHLORIDE SERPL-SCNC: 101 MMOL/L (ref 96–108)
CO2 SERPL-SCNC: 30 MMOL/L (ref 21–32)
CREAT SERPL-MCNC: 0.76 MG/DL (ref 0.6–1.3)
DOP CALC LVOT AREA: 3.46 CM2
DOP CALC LVOT DIAMETER: 2.1 CM
EOSINOPHIL # BLD MANUAL: 0.3 THOUSAND/UL (ref 0–0.4)
EOSINOPHIL NFR BLD MANUAL: 1 % (ref 0–6)
ERYTHROCYTE [DISTWIDTH] IN BLOOD BY AUTOMATED COUNT: 15.2 % (ref 11.6–15.1)
FRACTIONAL SHORTENING: 34 (ref 28–44)
GFR SERPL CREATININE-BSD FRML MDRD: 96 ML/MIN/1.73SQ M
GLUCOSE SERPL-MCNC: 108 MG/DL (ref 65–140)
GLUCOSE SERPL-MCNC: 116 MG/DL (ref 65–140)
GLUCOSE SERPL-MCNC: 127 MG/DL (ref 65–140)
HCT VFR BLD AUTO: 45 % (ref 36.5–49.3)
HGB BLD-MCNC: 14.1 G/DL (ref 12–17)
INR PPP: 1.07 (ref 0.85–1.19)
INTERVENTRICULAR SEPTUM IN DIASTOLE (PARASTERNAL SHORT AXIS VIEW): 1.5 CM
INTERVENTRICULAR SEPTUM: 1.5 CM (ref 0.6–1.1)
LAAS-AP2: 37.2 CM2
LAAS-AP4: 41.6 CM2
LEFT ATRIUM SIZE: 5.6 CM
LEFT ATRIUM VOLUME (MOD BIPLANE): 165 ML
LEFT ATRIUM VOLUME INDEX (MOD BIPLANE): 63 ML/M2
LEFT INTERNAL DIMENSION IN SYSTOLE: 3.7 CM (ref 2.1–4)
LEFT VENTRICLE DIASTOLIC VOLUME (MOD BIPLANE): 108 ML
LEFT VENTRICLE DIASTOLIC VOLUME INDEX (MOD BIPLANE): 41.2 ML/M2
LEFT VENTRICLE SYSTOLIC VOLUME (MOD BIPLANE): 41 ML
LEFT VENTRICLE SYSTOLIC VOLUME INDEX (MOD BIPLANE): 15.6 ML/M2
LEFT VENTRICULAR INTERNAL DIMENSION IN DIASTOLE: 5.6 CM (ref 3.5–6)
LEFT VENTRICULAR POSTERIOR WALL IN END DIASTOLE: 1.6 CM
LEFT VENTRICULAR STROKE VOLUME: 94 ML
LV EF: 62 %
LVSV (TEICH): 94 ML
LYMPHOCYTES # BLD AUTO: 20.27 THOUSAND/UL (ref 0.6–4.47)
LYMPHOCYTES # BLD AUTO: 67 % (ref 14–44)
MCH RBC QN AUTO: 29.6 PG (ref 26.8–34.3)
MCHC RBC AUTO-ENTMCNC: 31.3 G/DL (ref 31.4–37.4)
MCV RBC AUTO: 94 FL (ref 82–98)
MONOCYTES # BLD AUTO: 0.91 THOUSAND/UL (ref 0–1.22)
MONOCYTES NFR BLD: 3 % (ref 4–12)
MV E'TISSUE VEL-SEP: 10 CM/S
NEUTROPHILS # BLD MANUAL: 8.78 THOUSAND/UL (ref 1.85–7.62)
NEUTS SEG NFR BLD AUTO: 29 % (ref 43–75)
PLATELET # BLD AUTO: 193 THOUSANDS/UL (ref 149–390)
PLATELET BLD QL SMEAR: ADEQUATE
PMV BLD AUTO: 10.9 FL (ref 8.9–12.7)
POTASSIUM SERPL-SCNC: 4.2 MMOL/L (ref 3.5–5.3)
PROCALCITONIN SERPL-MCNC: <0.05 NG/ML
PROT SERPL-MCNC: 7.6 G/DL (ref 6.4–8.4)
PROTHROMBIN TIME: 14.1 SECONDS (ref 12.3–15)
QRS AXIS: 109 DEGREES
QRSD INTERVAL: 82 MS
QT INTERVAL: 240 MS
QTC INTERVAL: 403 MS
RBC # BLD AUTO: 4.77 MILLION/UL (ref 3.88–5.62)
RIGHT ATRIUM AREA SYSTOLE A4C: 29.4 CM2
RIGHT VENTRICLE ID DIMENSION: 4.8 CM
SL AMB POCT HEMOGLOBIN AIC: 5.3 (ref ?–6.5)
SL CV LEFT ATRIUM LENGTH A2C: 7.5 CM
SL CV LV EF: 55
SL CV PED ECHO LEFT VENTRICLE DIASTOLIC VOLUME (MOD BIPLANE) 2D: 152 ML
SL CV PED ECHO LEFT VENTRICLE SYSTOLIC VOLUME (MOD BIPLANE) 2D: 59 ML
SODIUM SERPL-SCNC: 138 MMOL/L (ref 135–147)
T WAVE AXIS: -39 DEGREES
TRICUSPID ANNULAR PLANE SYSTOLIC EXCURSION: 2.6 CM
TSH SERPL DL<=0.05 MIU/L-ACNC: 4.28 UIU/ML (ref 0.45–4.5)
VENTRICULAR RATE: 170 BPM
WBC # BLD AUTO: 30.26 THOUSAND/UL (ref 4.31–10.16)

## 2024-11-25 PROCEDURE — 71045 X-RAY EXAM CHEST 1 VIEW: CPT

## 2024-11-25 PROCEDURE — 36415 COLL VENOUS BLD VENIPUNCTURE: CPT | Performed by: EMERGENCY MEDICINE

## 2024-11-25 PROCEDURE — 99223 1ST HOSP IP/OBS HIGH 75: CPT | Performed by: INTERNAL MEDICINE

## 2024-11-25 PROCEDURE — 93005 ELECTROCARDIOGRAM TRACING: CPT

## 2024-11-25 PROCEDURE — 85730 THROMBOPLASTIN TIME PARTIAL: CPT | Performed by: EMERGENCY MEDICINE

## 2024-11-25 PROCEDURE — 83880 ASSAY OF NATRIURETIC PEPTIDE: CPT | Performed by: EMERGENCY MEDICINE

## 2024-11-25 PROCEDURE — 83036 HEMOGLOBIN GLYCOSYLATED A1C: CPT | Performed by: INTERNAL MEDICINE

## 2024-11-25 PROCEDURE — 93306 TTE W/DOPPLER COMPLETE: CPT

## 2024-11-25 PROCEDURE — 96365 THER/PROPH/DIAG IV INF INIT: CPT

## 2024-11-25 PROCEDURE — 93010 ELECTROCARDIOGRAM REPORT: CPT | Performed by: INTERNAL MEDICINE

## 2024-11-25 PROCEDURE — 84145 PROCALCITONIN (PCT): CPT | Performed by: INTERNAL MEDICINE

## 2024-11-25 PROCEDURE — 80053 COMPREHEN METABOLIC PANEL: CPT | Performed by: EMERGENCY MEDICINE

## 2024-11-25 PROCEDURE — 93306 TTE W/DOPPLER COMPLETE: CPT | Performed by: INTERNAL MEDICINE

## 2024-11-25 PROCEDURE — 93000 ELECTROCARDIOGRAM COMPLETE: CPT | Performed by: INTERNAL MEDICINE

## 2024-11-25 PROCEDURE — 99285 EMERGENCY DEPT VISIT HI MDM: CPT | Performed by: EMERGENCY MEDICINE

## 2024-11-25 PROCEDURE — 85007 BL SMEAR W/DIFF WBC COUNT: CPT | Performed by: EMERGENCY MEDICINE

## 2024-11-25 PROCEDURE — 85027 COMPLETE CBC AUTOMATED: CPT | Performed by: EMERGENCY MEDICINE

## 2024-11-25 PROCEDURE — 99204 OFFICE O/P NEW MOD 45 MIN: CPT | Performed by: INTERNAL MEDICINE

## 2024-11-25 PROCEDURE — 99285 EMERGENCY DEPT VISIT HI MDM: CPT

## 2024-11-25 PROCEDURE — 82948 REAGENT STRIP/BLOOD GLUCOSE: CPT

## 2024-11-25 PROCEDURE — 84484 ASSAY OF TROPONIN QUANT: CPT | Performed by: EMERGENCY MEDICINE

## 2024-11-25 PROCEDURE — 84443 ASSAY THYROID STIM HORMONE: CPT | Performed by: EMERGENCY MEDICINE

## 2024-11-25 PROCEDURE — 96376 TX/PRO/DX INJ SAME DRUG ADON: CPT

## 2024-11-25 PROCEDURE — 85610 PROTHROMBIN TIME: CPT | Performed by: EMERGENCY MEDICINE

## 2024-11-25 RX ORDER — ONDANSETRON 2 MG/ML
4 INJECTION INTRAMUSCULAR; INTRAVENOUS EVERY 4 HOURS PRN
Status: DISCONTINUED | OUTPATIENT
Start: 2024-11-25 | End: 2024-11-28 | Stop reason: HOSPADM

## 2024-11-25 RX ORDER — ACETAMINOPHEN 325 MG/1
650 TABLET ORAL EVERY 4 HOURS PRN
Status: DISCONTINUED | OUTPATIENT
Start: 2024-11-25 | End: 2024-11-28 | Stop reason: HOSPADM

## 2024-11-25 RX ORDER — ONDANSETRON 2 MG/ML
4 INJECTION INTRAMUSCULAR; INTRAVENOUS EVERY 6 HOURS PRN
Status: DISCONTINUED | OUTPATIENT
Start: 2024-11-25 | End: 2024-11-25

## 2024-11-25 RX ORDER — METOPROLOL TARTRATE 25 MG/1
25 TABLET, FILM COATED ORAL EVERY 12 HOURS SCHEDULED
Status: DISCONTINUED | OUTPATIENT
Start: 2024-11-25 | End: 2024-11-25

## 2024-11-25 RX ORDER — INSULIN LISPRO 100 [IU]/ML
1-6 INJECTION, SOLUTION INTRAVENOUS; SUBCUTANEOUS
Status: DISCONTINUED | OUTPATIENT
Start: 2024-11-25 | End: 2024-11-28 | Stop reason: HOSPADM

## 2024-11-25 RX ORDER — DILTIAZEM HYDROCHLORIDE 5 MG/ML
15 INJECTION INTRAVENOUS ONCE
Status: COMPLETED | OUTPATIENT
Start: 2024-11-25 | End: 2024-11-25

## 2024-11-25 RX ORDER — METOPROLOL TARTRATE 25 MG/1
25 TABLET, FILM COATED ORAL EVERY 6 HOURS
Status: DISCONTINUED | OUTPATIENT
Start: 2024-11-25 | End: 2024-11-26

## 2024-11-25 RX ORDER — NICOTINE 21 MG/24HR
1 PATCH, TRANSDERMAL 24 HOURS TRANSDERMAL DAILY
Status: DISCONTINUED | OUTPATIENT
Start: 2024-11-25 | End: 2024-11-28 | Stop reason: HOSPADM

## 2024-11-25 RX ORDER — FUROSEMIDE 10 MG/ML
40 INJECTION INTRAMUSCULAR; INTRAVENOUS ONCE
Status: COMPLETED | OUTPATIENT
Start: 2024-11-25 | End: 2024-11-25

## 2024-11-25 RX ORDER — ENOXAPARIN SODIUM 100 MG/ML
40 INJECTION SUBCUTANEOUS DAILY
Status: DISCONTINUED | OUTPATIENT
Start: 2024-11-25 | End: 2024-11-25

## 2024-11-25 RX ORDER — LORAZEPAM 0.5 MG/1
0.5 TABLET ORAL EVERY 8 HOURS PRN
Status: DISCONTINUED | OUTPATIENT
Start: 2024-11-25 | End: 2024-11-28 | Stop reason: HOSPADM

## 2024-11-25 RX ORDER — ENOXAPARIN SODIUM 100 MG/ML
40 INJECTION SUBCUTANEOUS EVERY 12 HOURS SCHEDULED
Status: DISCONTINUED | OUTPATIENT
Start: 2024-11-25 | End: 2024-11-25

## 2024-11-25 RX ADMIN — DILTIAZEM HYDROCHLORIDE 2.5 MG/HR: 5 INJECTION, SOLUTION INTRAVENOUS at 13:33

## 2024-11-25 RX ADMIN — METOPROLOL TARTRATE 25 MG: 25 TABLET, FILM COATED ORAL at 17:23

## 2024-11-25 RX ADMIN — DILTIAZEM HYDROCHLORIDE 15 MG: 5 INJECTION INTRAVENOUS at 13:06

## 2024-11-25 RX ADMIN — APIXABAN 5 MG: 5 TABLET, FILM COATED ORAL at 17:23

## 2024-11-25 RX ADMIN — FUROSEMIDE 40 MG: 10 INJECTION, SOLUTION INTRAVENOUS at 17:33

## 2024-11-25 RX ADMIN — METOPROLOL TARTRATE 25 MG: 25 TABLET, FILM COATED ORAL at 22:30

## 2024-11-25 RX ADMIN — ACETAMINOPHEN 650 MG: 325 TABLET ORAL at 19:57

## 2024-11-25 NOTE — ASSESSMENT & PLAN NOTE
Not on antihypertensive at home  Presently on cardizem gtt & Lopressor 25 mg q12h with elevated BP reading

## 2024-11-25 NOTE — ASSESSMENT & PLAN NOTE
History of diabetes hypertension and anxiety sent over from PCP office for new onset atrial fibrillation  Patient reports over the past week has had orthopnea palpitations and chest discomfort  Patient did lose 140 pounds resulting in better control of his diabetes mellitus but recently gained back about 40 pounds  Given diltiazem in ED with diltiazem infusion currently at 2.5 mg/h  Starting metoprolol tartrate  Check echocardiogram.  Consult cardiology.

## 2024-11-25 NOTE — ASSESSMENT & PLAN NOTE
Lab Results   Component Value Date    HGBA1C 5.3 11/25/2024     Recent Labs     11/25/24  1522   POCGLU 108     Patient reports much better A1c control after losing 140 pounds  Prior to admission on metformin.  Be held in favor of sliding-scale insulin during hospitalization

## 2024-11-25 NOTE — CONSULTS
Consultation - Cardiology   Name: Brando Martinez 64 y.o. male I MRN: [id number]  Unit/Bed#: E4 -01 I Date of Admission: 11/25/2024   Date of Service: 11/25/2024 I Hospital Day: 0   Inpatient consult to Cardiology  Consult performed by: Christy Rosa PA-C  Consult ordered by: Freddy Rowe DO      Physician Requesting Evaluation: Freddy Rowe DO   Reason for Evaluation / Principal Problem: New onset A-fib  Assessment & Plan  New onset atrial fibrillation (HCC)  On Cardizem gtt  Increase Lopressor to 25 mg q6h with hold parameters  SLIM started Lovenox. I will switch to Eliquis 5 mg BID and submit for price check  NPO at midnight for WALTER/CV 11/26/24  Telemetry  Essential hypertension  Not on antihypertensive at home  Presently on cardizem gtt & Lopressor 25 mg q12h with elevated BP reading  Current every day smoker  Encouraged cessation  Morbid obesity with BMI of 40.0-44.9, adult (HCC)  Encouraged continued healthy weight loss    History of Present Illness   Brando Martinez is a 64 y.o. male who presented to [facility] ED from home c/o 1 week of JACOBO. Started after yard work last weekend. Thought it was allergies from dust. Dry cough x 2 days. Sxs persisted despite trying OTC cold & flu medications. Pt reports 4-5 nights of orthopnea. Palpitations yesterday at home after climbing a few flights of steps with grocery bags. Improved with rest. No chest pain, pressure or heaviness.     Review of Systems   Constitutional:  Positive for fatigue. Negative for appetite change.   Respiratory:  Positive for cough and shortness of breath. Negative for chest tightness.         + orthopnea   Cardiovascular:  Positive for palpitations. Negative for chest pain.        + peripheral edema x 1 week bl le   Gastrointestinal:  Negative for abdominal pain and nausea.   Neurological:  Negative for dizziness.        Mild postural lightheadedness only momentary , chronic problem     Pertinent PMH: small trachea, HTN, T2DM, obesity  Surgical  history: cholecystectomy, colonoscopy  FH: MGF - stroke  Social history: Retired . Smoker. No alcohol or drug use. Lives alone. Enjoys biking and walking in the park by his house. Enjoys fishing for trout locally and sometimes up in [facility].    Not on any Rx medications at home prior to admission.    Objective :  Temp:  [96.9 °F (36.1 °C)-98.2 °F (36.8 °C)] 97.5 °F (36.4 °C)  HR:  [] 81  BP: (110-175)/() 173/104  Resp:  [18-22] 18  SpO2:  [92 %-98 %] 94 %  O2 Device: None (Room air)    Vitals:    11/25/24 1426 11/25/24 1510   Weight: (!) 148 kg (326 lb 4.5 oz) (!) 148 kg (326 lb)     Physical Exam  Vitals and nursing note reviewed.   Constitutional:       General: He is not in acute distress.  HENT:      Head: Normocephalic and atraumatic.      Nose: No congestion.      Mouth/Throat:      Mouth: Mucous membranes are moist.   Eyes:      Conjunctiva/sclera: Conjunctivae normal.   Neck:      Comments: Thick neck cannot appreciate any JVD  Cardiovascular:      Rate and Rhythm: Normal rate. Rhythm irregularly irregular.      Heart sounds: S1 normal and S2 normal. No murmur heard.  Pulmonary:      Effort: Pulmonary effort is normal.      Breath sounds: No wheezing, rhonchi or rales.   Abdominal:      Palpations: Abdomen is soft.      Comments: + central obesity   Musculoskeletal:      Comments: Trace edema bl le in the ankles   Skin:     General: Skin is warm and dry.   Neurological:      Mental Status: He is alert and oriented to person, place, and time.   Psychiatric:         Behavior: Behavior normal.     Lab Results: I have reviewed the following results:  CBC, CMP from today,   Trops 8- 7 - pending  TSH WNL  Imaging: CXR today with poor penetration from obesity no cardiomegaly either bl pleural effusions or atelectasis bl lung bases  Other studies: EKG today with Afib RVR  bpm, rightward axis. No infarct or ischemia.  Echo today with normal LV size & fxn, LVEF 55%. Unable to  assess diastolic fxn due to A-fib. RV size & fxn nl. Severe LAE. AV sclerosis. Mild MR. Dilated IVC.  Telemetry- Afib with average HR 82 bpm in the last 24 hours. As fast as 153 bpm.    Christy Rosa PA-C

## 2024-11-25 NOTE — ASSESSMENT & PLAN NOTE
May be stress-induced.  Check procalcitonin.  CXR with bibasilar opacities which may be layered effusion  No source of infection found.    Results from last 7 days   Lab Units 11/25/24  1301   WBC Thousand/uL 30.26*

## 2024-11-25 NOTE — ASSESSMENT & PLAN NOTE
On Cardizem gtt  Increase Lopressor to 25 mg q6h with hold parameters  SLIM started Lovenox. I will switch to Eliquis 5 mg BID and submit for price check  NPO at midnight for WALTER/CV 11/26/24  Telemetry

## 2024-11-25 NOTE — ASSESSMENT & PLAN NOTE
Previously on HCTZ also for lower extremity edema  No longer taking.  Starting metoprolol for atrial fibrillation

## 2024-11-25 NOTE — H&P
H&P - Hospitalist   Name: Brando Martinez 64 y.o. male I MRN: [id number]  Unit/Bed#: ED-27 I Date of Admission: 11/25/2024   Date of Service: 11/25/2024 I Hospital Day: 0     Assessment & Plan  New onset atrial fibrillation (HCC)  History of diabetes hypertension and anxiety sent over from PCP office for new onset atrial fibrillation  Patient reports over the past week has had orthopnea palpitations and chest discomfort  Patient did lose 140 pounds resulting in better control of his diabetes mellitus but recently gained back about 40 pounds  Given diltiazem in ED with diltiazem infusion currently at 2.5 mg/h  Starting metoprolol tartrate  Check echocardiogram.  Consult cardiology.  Essential hypertension  Previously on HCTZ also for lower extremity edema  No longer taking.  Starting metoprolol for atrial fibrillation  Type 2 diabetes mellitus without complication, without long-term current use of insulin ([facility])  Lab Results   Component Value Date    HGBA1C 5.3 11/25/2024     Recent Labs     11/25/24  1522   POCGLU 108     Patient reports much better A1c control after losing 140 pounds  Prior to admission on metformin.  Be held in favor of sliding-scale insulin during hospitalization  Generalized anxiety disorder  Will place on lorazepam as needed  Leukocytosis  May be stress-induced.  Check procalcitonin.  CXR with bibasilar opacities which may be layered effusion  No source of infection found.    Results from last 7 days   Lab Units 11/25/24  1301   WBC Thousand/uL 30.26*     Morbid obesity with BMI of 40.0-44.9, adult ([facility])  Body mass index is 44.21 kg/m².    VTE Pharmacologic Prophylaxis:   Moderate Risk (Score 3-4) - Pharmacological DVT Prophylaxis Ordered: enoxaparin (Lovenox).  Code Status: Level 1 - Full Code   Discussion with family:     Anticipated Length of Stay: Patient will be admitted on an inpatient basis with an anticipated length of stay of greater than 2 midnights secondary to atrial  fibrillation.    Chief Complaint:     Shortness of Breath (Pt reports shortness of breath x1 week. Pt denies CP. Reports symptoms worsened with exertion and laying flat. PCP sent pt to ED for new onset afib. )    History of Present Illness  Brando Martinez is a 64 y.o. male with a PMH of obesity diabetes mellitus hypertension who presents with shortness of breath.  Patient reports over the past week has had orthopnea and worsening dyspnea on exertion.  Initially he thought his symptoms was from blowing the leaves but symptoms persisted during the week.  He has to sleep in a recliner.  He went to see his PCP and was noted to have new onset atrial fibrillation and was sent here to the hospital.  He was noted to have leukocytosis but denies any fevers chills nausea vomiting or dysuria.    Review of Systems   Constitutional:  Negative for chills and fever.   HENT:  Negative for facial swelling.    Eyes:  Negative for visual disturbance.   Respiratory:  Positive for shortness of breath. Negative for wheezing.    Cardiovascular:  Positive for chest pain and palpitations.   Gastrointestinal:  Negative for diarrhea, nausea and vomiting.   Genitourinary:  Negative for dysuria, hematuria and urgency.   Musculoskeletal:  Negative for myalgias.   Skin:  Negative for rash.   Neurological:  Negative for seizures, speech difficulty and numbness.   Psychiatric/Behavioral:  The patient is nervous/anxious.    All other systems reviewed and are negative.      Past Medical and Surgical History:   Past Medical History:   Diagnosis Date    Anxiety     last asessed 06Nov2012    Depression     last assessed 29Jan2013    Hemangioma of liver     Of liver - emnolized 2012;  last assessed [id number]    History of basal cell cancer     basal cell skin ca on nose    Hyperlipidemia     Polyp of sigmoid colon 09/01/2012    x 1  9/12     Past Surgical History:   Procedure Laterality Date    CHOLECYSTECTOMY LAPAROSCOPIC      EMBOLIZATION  02/01/2012     Large Hemangioma, 2/12    VENTRAL HERNIA REPAIR      Ventral 00     Meds/Allergies:  Allergies: No Known Allergies  Prior to Admission Medications   Prescriptions Last Dose Informant Patient Reported? Taking?   LORazepam (ATIVAN) 0.5 mg tablet Not Taking Self No No   Sig: Take 1 tablet (0.5 mg total) by mouth every 8 (eight) hours as needed for anxiety   Patient not taking: Reported on 11/25/2024   Multiple Vitamin (MULTI VITAMIN MENS PO) 11/25/2024 Self Yes Yes   Sig: Take by mouth in the morning   hydrOXYzine HCL (ATARAX) 25 mg tablet Not Taking  No No   Sig: Take 1 tablet (25 mg total) by mouth daily as needed for anxiety   Patient not taking: Reported on 11/25/2024   metFORMIN (GLUCOPHAGE) 1000 MG tablet 11/25/2024 Self No Yes   Sig: TAKE 1 TABLET BY MOUTH EVERY DAY WITH BREAKFAST      Facility-Administered Medications: None     Social History:     Social History     Socioeconomic History    Marital status: Single     Spouse name: Not on file    Number of children: Not on file    Years of education: Not on file    Highest education level: Not on file   Occupational History    Not on file   Tobacco Use    Smoking status: Every Day     Current packs/day: 0.50     Average packs/day: 0.5 packs/day for 20.9 years (10.4 ttl pk-yrs)     Types: Cigarettes     Start date: 2004    Smokeless tobacco: Never    Tobacco comments:     8-10 cigs per day   Vaping Use    Vaping status: Never Used   Substance and Sexual Activity    Alcohol use: No    Drug use: Never    Sexual activity: Yes     Partners: Female   Other Topics Concern    Not on file   Social History Narrative    Not on file     Social Drivers of Health     Financial Resource Strain: Not on file   Food Insecurity: Not on file   Transportation Needs: Not on file   Physical Activity: Not on file   Stress: Not on file   Social Connections: Not on file   Intimate Partner Violence: Not on file   Housing Stability: Not on file     Patient Pre-hospital Living Situation:  Home  Patient Pre-hospital Level of Mobility:   Patient Pre-hospital Diet Restrictions:     Objective   Vitals:   Blood Pressure: 157/77 (11/25/24 1400)  Pulse: 86 (11/25/24 1400)  Temperature: 98.2 °F (36.8 °C) (11/25/24 1250)  Respirations: 20 (11/25/24 1400)  Weight - Scale: (!) 148 kg (326 lb 1 oz) (11/25/24 1250)  SpO2: 94 % (11/25/24 1400)    Physical Exam  Vitals reviewed.   Constitutional:       General: He is not in acute distress.     Appearance: He is obese.   HENT:      Head: Atraumatic.   Eyes:      General: No scleral icterus.  Cardiovascular:      Rate and Rhythm: Rhythm irregular.      Heart sounds: Normal heart sounds.   Pulmonary:      Breath sounds: No wheezing.   Abdominal:      General: Bowel sounds are normal.      Palpations: Abdomen is soft.      Tenderness: There is no abdominal tenderness.   Musculoskeletal:         General: No swelling or tenderness.   Skin:     General: Skin is warm and dry.   Neurological:      General: No focal deficit present.      Mental Status: He is alert and oriented to person, place, and time.      Motor: No weakness.   Psychiatric:         Mood and Affect: Mood normal.         Additional Data:   Lab Results: I have reviewed the following results:  Results from last 7 days   Lab Units 11/25/24  1301   WBC Thousand/uL 30.26*   HEMOGLOBIN g/dL 14.1   HEMATOCRIT % 45.0   PLATELETS Thousands/uL 193     Results from last 7 days   Lab Units 11/25/24  1301   SODIUM mmol/L 138   POTASSIUM mmol/L 4.2   CHLORIDE mmol/L 101   CO2 mmol/L 30   ANION GAP mmol/L 7   BUN mg/dL 18   CREATININE mg/dL 0.76   CALCIUM mg/dL 10.3*   ALBUMIN g/dL 4.7   TOTAL BILIRUBIN mg/dL 2.02*   ALK PHOS U/L 71   ALT U/L 28   AST U/L 19   EGFR ml/min/1.73sq m 96   GLUCOSE RANDOM mg/dL 127     Results from last 7 days   Lab Units 11/25/24  1301   INR  1.07         Results from last 7 days   Lab Units 11/25/24  1301   HS TNI 0HR ng/L 8                                  Lines/Drains  Invasive Devices        Peripheral Intravenous Line  Duration             Peripheral IV 11/25/24 Left Antecubital <1 day                    Imaging:   Personally reviewed the following image studies in PACS and associated radiology reports:  XR chest 1 view portable  Result Date: 11/25/2024  Impression: There is hazy bibasilar opacity which may represent a combination of layered effusion and parenchymal opacity. Workstation performed: [id number]       EKG, Pathology, and Other Studies Reviewed on Admission:   EKG  Result Date: 11/25/24  Personally reviewed strips with impression of: Atrial fibrillation 170 bpm    Administrative Statements       ** Please Note: This note has been constructed using a voice recognition system. **

## 2024-11-25 NOTE — ED PROVIDER NOTES
Time reflects when diagnosis was documented in both MDM as applicable and the Disposition within this note       Time User Action Codes Description Comment    11/25/2024  1:51 PM Silverio Crabtree Add [I48.91] Atrial fibrillation (HCC)     11/25/2024  4:01 PM Freddy Rowe Add [I48.91] New onset atrial fibrillation (HCC)           ED Disposition       ED Disposition   Admit    Condition   Stable    Date/Time   Mon Nov 25, 2024  1:50 PM    Comment   Case was discussed with SLIM and the patient's admission status was agreed to be Admission Status: inpatient status to the service of Dr. Rowe .               Assessment & Plan       Medical Decision Making  1. Atrial fibrillation - Patient with new onset atrial fibrillation, does not take anticoagulation. Will check troponin, CBC for leukocytosis and anemia, metabolic panel for electrolyte abnormalities and dehydration, BNP and CXR for overload. Will give dose of Cardizem to assess utility of gtt. Patient will require admission.     Problems Addressed:  Atrial fibrillation (HCC): undiagnosed new problem with uncertain prognosis    Amount and/or Complexity of Data Reviewed  Labs: ordered.  Radiology: ordered and independent interpretation performed. Decision-making details documented in ED Course.  ECG/medicine tests: ordered and independent interpretation performed. Decision-making details documented in ED Course.    Risk  Prescription drug management.  Decision regarding hospitalization.        ED Course as of 11/27/24 1449   Mon Nov 25, 2024   1314 Patient with good response to Cardizem, heart rate down to 90's, continues to ne in a-fib   1406 ECG evaluated by myself, interpretation included in procedure section of note.    1407 Chest x-ray evaluated by me, interpretation:  vascular congestion, no infiltrate.       Medications   diltiazem (CARDIZEM) injection 15 mg (15 mg Intravenous Given 11/25/24 1306)       ED Risk Strat Scores                           SBIRT 20yo+       Flowsheet Row Most Recent Value   Initial Alcohol Screen: US AUDIT-C     1. How often do you have a drink containing alcohol? 0 Filed at: 11/25/2024 1310   2. How many drinks containing alcohol do you have on a typical day you are drinking?  0 Filed at: 11/25/2024 1310   3a. Male UNDER 65: How often do you have five or more drinks on one occasion? 0 Filed at: 11/25/2024 1310   3b. FEMALE Any Age, or MALE 65+: How often do you have 4 or more drinks on one occassion? 0 Filed at: 11/25/2024 1310   Audit-C Score 0 Filed at: 11/25/2024 1310   RASHMI: How many times in the past year have you...    Used an illegal drug or used a prescription medication for non-medical reasons? Never Filed at: 11/25/2024 1310                            History of Present Illness       Chief Complaint   Patient presents with    Shortness of Breath     Pt reports shortness of breath x1 week. Pt denies CP. Reports symptoms worsened with exertion and laying flat. PCP sent pt to ED for new onset afib.        Past Medical History:   Diagnosis Date    Anxiety     last asessed 06Nov2012    Depression     last assessed 29Jan2013    Hemangioma of liver     Of liver - emnolized 2012;  last assessed 19Aug2014    History of basal cell cancer     basal cell skin ca on nose    Hyperlipidemia     Polyp of sigmoid colon 09/01/2012    x 1  9/12      Past Surgical History:   Procedure Laterality Date    CHOLECYSTECTOMY LAPAROSCOPIC      EMBOLIZATION  02/01/2012    Large Hemangioma, 2/12    VENTRAL HERNIA REPAIR      Ventral 00      Family History   Problem Relation Age of Onset    Depression Mother     Hodgkin's lymphoma Maternal Uncle       Social History     Tobacco Use    Smoking status: Every Day     Current packs/day: 0.50     Average packs/day: 0.5 packs/day for 20.9 years (10.5 ttl pk-yrs)     Types: Cigarettes     Start date: 2004    Smokeless tobacco: Never    Tobacco comments:     8-10 cigs per day   Vaping Use    Vaping status: Never Used    Substance Use Topics    Alcohol use: No    Drug use: Never      E-Cigarette/Vaping    E-Cigarette Use Never User       E-Cigarette/Vaping Substances      I have reviewed and agree with the history as documented.     65 YO male presents from PCP for evaluation and management of atrial fibrillation. Patient states he has had some increased shortness of breath and dyspnea on exertion. States this has been present for a some time, he doesn't know exactly how long. Patient has noticed some swelling in the lower extremities B/L and had dizziness when he gets up quickly. He denies chest pain but has occasionally has some palpitations. He was evaluated by his PCP today and found to be in atrial fibrillation, no history of this. Patient has had a mild cough. Pt denies CP/F/C/N/V/D/C, no dysuria, burning on urination or blood in urine.       History provided by:  Patient   used: No        Review of Systems   Constitutional:  Negative for fever.   HENT:  Negative for dental problem.    Eyes:  Negative for visual disturbance.   Respiratory:  Positive for shortness of breath.    Cardiovascular:  Positive for palpitations and leg swelling. Negative for chest pain.   Gastrointestinal:  Negative for abdominal pain, nausea and vomiting.   Genitourinary:  Negative for dysuria and frequency.   Musculoskeletal:  Negative for neck pain and neck stiffness.   Skin:  Negative for rash.   Neurological:  Positive for light-headedness. Negative for dizziness and weakness.   Psychiatric/Behavioral:  Negative for agitation, behavioral problems and confusion.    All other systems reviewed and are negative.          Objective       ED Triage Vitals   Temperature Pulse Blood Pressure Respirations SpO2 Patient Position - Orthostatic VS   11/25/24 1250 11/25/24 1250 11/25/24 1309 11/25/24 1250 11/25/24 1312 11/25/24 1345   98.2 °F (36.8 °C) (S) (!) 170 157/98 22 93 % Lying      Temp Source Heart Rate Source BP Location FiO2 (%)  Pain Score    11/25/24 1423 11/25/24 1250 11/25/24 1345 -- 11/25/24 1400    Temporal Monitor Right arm  No Pain      Vitals      Date and Time Temp Pulse SpO2 Resp BP Pain Score FACES Pain Rating User   11/27/24 1230 97.6 °F (36.4 °C) 56 94 % 18 146/85 -- -- YC   11/27/24 1148 -- 53 -- -- 119/72 -- -- SF   11/27/24 1121 -- 52 92 % 16 119/72 -- -- MM   11/27/24 1116 -- 54 94 % 20 126/73 No Pain -- MM   11/27/24 1108 -- 56 94 % 17 112/71 -- -- MM   11/27/24 1101 97.3 °F (36.3 °C) 58 92 % 16 120/73 No Pain -- MM   11/27/24 0900 -- -- -- -- -- No Pain -- TU   11/27/24 0748 98 °F (36.7 °C) 83 92 % 18 187/95 -- -- YC   11/27/24 0245 97 °F (36.1 °C) 81 90 % 18 130/71 -- -- MW   11/26/24 2155 -- 76 -- -- 147/78 -- -- HB   11/26/24 2155 97 °F (36.1 °C) -- 94 % 18 -- -- -- LEO   11/26/24 1926 97.1 °F (36.2 °C) 76 93 % 18 146/74 -- -- LEO   11/26/24 1915 -- -- -- -- -- No Pain -- HB   11/26/24 1700 -- -- -- -- -- No Pain -- VW   11/26/24 1550 97.4 °F (36.3 °C) 79 94 % 18 138/101 -- -- LEO   11/26/24 1111 97.2 °F (36.2 °C) 72 96 % 18 175/84 -- -- JH   11/26/24 0758 97.1 °F (36.2 °C) 56 97 % 18 146/97 -- -- JH   11/26/24 0649 -- 54 -- -- 148/94 -- -- HB   11/26/24 0245 97 °F (36.1 °C) 63 96 % 18 151/80 -- -- LEO   11/25/24 2230 -- 64 -- -- 137/89 -- -- HB   11/25/24 2230 97.4 °F (36.3 °C) -- 94 % 18 -- -- -- LEO   11/25/24 1957 -- -- -- -- -- 5 -- HB   11/25/24 1915 -- -- -- -- -- No Pain -- HB   11/25/24 1906 97.2 °F (36.2 °C) 85 94 % 18 142/89 -- -- LEO   11/25/24 1510 97.5 °F (36.4 °C) 81 94 % 18 173/104 -- -- LEO   11/25/24 1426 96.9 °F (36.1 °C) 82 95 % 18 175/109 -- -- VW   11/25/24 1425 -- -- -- -- -- No Pain -- VW   11/25/24 1423 96.9 °F (36.1 °C) 79 92 % 18 -- -- -- VW   11/25/24 1400 -- 86 94 % 20 157/77 -- -- SR   11/25/24 1400 -- -- -- -- -- No Pain -- VW   11/25/24 1345 -- 86 94 % 18 153/76 -- -- SR   11/25/24 1333 -- 86 -- -- 164/91 -- -- SR   11/25/24 1312 -- -- 93 % -- -- -- -- SR   11/25/24 1309 -- -- -- -- 157/98 --  -- SR   11/25/24 1250 98.2 °F (36.8 °C)  170 -- 22 -- -- -- SR            Physical Exam  Vitals and nursing note reviewed.   Constitutional:       Appearance: He is well-developed.   HENT:      Head: Normocephalic and atraumatic.   Eyes:      Extraocular Movements: Extraocular movements intact.   Cardiovascular:      Rate and Rhythm: Tachycardia present.   Pulmonary:      Effort: Pulmonary effort is normal.   Abdominal:      General: There is no distension.   Musculoskeletal:         General: Normal range of motion.      Cervical back: Normal range of motion.   Skin:     Findings: No rash.   Neurological:      Mental Status: He is alert and oriented to person, place, and time.   Psychiatric:         Behavior: Behavior normal.         Results Reviewed       Procedure Component Value Units Date/Time    Manual Differential(PHLEBS Do Not Order) [[id number]]  (Abnormal) Collected: 11/25/24 1301    Lab Status: Final result Specimen: Blood from Arm, Left Updated: 11/25/24 2240     Segmented % 29 %      Lymphocytes % 67 %      Monocytes % 3 %      Eosinophils % 1 %      Basophils % 0 %      Absolute Neutrophils 8.78 Thousand/uL      Absolute Lymphocytes 20.27 Thousand/uL      Absolute Monocytes 0.91 Thousand/uL      Absolute Eosinophils 0.30 Thousand/uL      Absolute Basophils 0.00 Thousand/uL      Total Counted --     Platelet Estimate Adequate     Anisocytosis Present    HS Troponin I 2hr [[id number]]  (Normal) Collected: 11/25/24 1514    Lab Status: Final result Specimen: Blood from Arm, Right Updated: 11/25/24 1554     hs TnI 2hr 7 ng/L      Delta 2hr hsTnI -1 ng/L     Procalcitonin [[id number]]  (Normal) Collected: 11/25/24 1301    Lab Status: Final result Specimen: Blood from Arm, Left Updated: 11/25/24 1526     Procalcitonin <0.05 ng/ml     TSH, 3rd generation with Free T4 reflex [[id number]]  (Normal) Collected: 11/25/24 1301    Lab Status: Final result Specimen: Blood from Arm, Left Updated: 11/25/24 1340     TSH 3RD  GENERATON 4.277 uIU/mL     Comprehensive metabolic panel [[id number]]  (Abnormal) Collected: 11/25/24 1301    Lab Status: Final result Specimen: Blood from Arm, Left Updated: 11/25/24 1338     Sodium 138 mmol/L      Potassium 4.2 mmol/L      Chloride 101 mmol/L      CO2 30 mmol/L      ANION GAP 7 mmol/L      BUN 18 mg/dL      Creatinine 0.76 mg/dL      Glucose 127 mg/dL      Calcium 10.3 mg/dL      AST 19 U/L      ALT 28 U/L      Alkaline Phosphatase 71 U/L      Total Protein 7.6 g/dL      Albumin 4.7 g/dL      Total Bilirubin 2.02 mg/dL      eGFR 96 ml/min/1.73sq m     Narrative:      [employer] guidelines for Chronic Kidney Disease (CKD):     Stage 1 with normal or high GFR (GFR > 90 mL/min/1.73 square meters)    Stage 2 Mild CKD (GFR = 60-89 mL/min/1.73 square meters)    Stage 3A Moderate CKD (GFR = 45-59 mL/min/1.73 square meters)    Stage 3B Moderate CKD (GFR = 30-44 mL/min/1.73 square meters)    Stage 4 Severe CKD (GFR = 15-29 mL/min/1.73 square meters)    Stage 5 End Stage CKD (GFR <15 mL/min/1.73 square meters)  Note: GFR calculation is accurate only with a steady state creatinine    B-Type Natriuretic Peptide(BNP) [[id number]]  (Abnormal) Collected: 11/25/24 1301    Lab Status: Final result Specimen: Blood from Arm, Left Updated: 11/25/24 1330      pg/mL     HS Troponin 0hr (reflex protocol) [[id number]]  (Normal) Collected: 11/25/24 1301    Lab Status: Final result Specimen: Blood from Arm, Left Updated: 11/25/24 1330     hs TnI 0hr 8 ng/L     Protime-INR [[id number]]  (Normal) Collected: 11/25/24 1301    Lab Status: Final result Specimen: Blood from Arm, Left Updated: 11/25/24 1322     Protime 14.1 seconds      INR 1.07    Narrative:      INR Therapeutic Range    Indication                                             INR Range      Atrial Fibrillation                                               2.0-3.0  Hypercoagulable State                                    2.0.2.3  Left  Ventricular Asist Device                            2.0-3.0  Mechanical Heart Valve                                  -    Aortic(with afib, MI, embolism, HF, LA enlargement,    and/or coagulopathy)                                     2.0-3.0 (2.5-3.5)     Mitral                                                             2.5-3.5  Prosthetic/Bioprosthetic Heart Valve               2.0-3.0  Venous thromboembolism (VTE: VT, PE        2.0-3.0    APTT [[id number]]  (Normal) Collected: 11/25/24 1301    Lab Status: Final result Specimen: Blood from Arm, Left Updated: 11/25/24 1322     PTT 30 seconds     CBC and differential [[id number]]  (Abnormal) Collected: 11/25/24 1301    Lab Status: Final result Specimen: Blood from Arm, Left Updated: 11/25/24 1312     WBC 30.26 Thousand/uL      RBC 4.77 Million/uL      Hemoglobin 14.1 g/dL      Hematocrit 45.0 %      MCV 94 fL      MCH 29.6 pg      MCHC 31.3 g/dL      RDW 15.2 %      MPV 10.9 fL      Platelets 193 Thousands/uL     Narrative:      This is an appended report.  These results have been appended to a previously verified report.            XR chest portable   Final Interpretation by Karl Leigh DO (11/26 [id number])      Bibasilar base opacities which likely represent some combination of pleural effusions and/or atelectasis. Developing pneumonia may present a similar appearance in the appropriate clinical setting. Right-sided opacity is not appreciably changed while the    left-sided opacity appears slightly improved.      Mild pulmonary vascular congestion is suggested, similar to radiograph of the previous day.            Resident: Domonique Lazo I, the attending radiologist, have reviewed the images and agree with the final report above.      Workstation performed: [id number]         XR chest 1 view portable   Final Interpretation by Everette Corrales MD (11/25 9705)      There is hazy bibasilar opacity which may represent a combination of layered effusion and  parenchymal opacity.            Workstation performed: [id number]             ECG 12 Lead Documentation Only    Date/Time: 11/25/2024 12:50 PM    Performed by: Silverio Crabtree MD  Authorized by: Silverio Crabtree MD    ECG reviewed by me, the ED Provider: yes    Patient location:  ED  Previous ECG:     Previous ECG:  Unavailable  Interpretation:     Interpretation: normal    Rate:     ECG rate:  170    ECG rate assessment: tachycardic    Rhythm:     Rhythm: atrial fibrillation    QRS:     QRS axis:  Normal    QRS intervals:  Normal  Conduction:     Conduction: normal    ST segments:     ST segments:  Normal  T waves:     T waves: normal        ED Medication and Procedure Management   Prior to Admission Medications   Prescriptions Last Dose Informant Patient Reported? Taking?   LORazepam (ATIVAN) 0.5 mg tablet Not Taking Self No No   Sig: Take 1 tablet (0.5 mg total) by mouth every 8 (eight) hours as needed for anxiety   Patient not taking: Reported on 11/25/2024   Multiple Vitamin (MULTI VITAMIN MENS PO) 11/25/2024 Self Yes Yes   Sig: Take by mouth in the morning   hydrOXYzine HCL (ATARAX) 25 mg tablet Not Taking  No No   Sig: Take 1 tablet (25 mg total) by mouth daily as needed for anxiety   Patient not taking: Reported on 11/25/2024   metFORMIN (GLUCOPHAGE) 1000 MG tablet 11/25/2024 Self No Yes   Sig: TAKE 1 TABLET BY MOUTH EVERY DAY WITH BREAKFAST      Facility-Administered Medications: None     Current Discharge Medication List        START taking these medications    Details   apixaban (ELIQUIS) 5 mg Take 1 tablet (5 mg total) by mouth 2 (two) times a day THIS IS A PRICE CHECK  Qty: 60 tablet, Refills: 0    Comments: THIS IS A PRICE CHECK  Associated Diagnoses: Atrial fibrillation (HCC)      furosemide (LASIX) 20 mg tablet Take 1 tablet (20 mg total) by mouth daily Do not start before November 28, 2024.  Qty: 30 tablet, Refills: 0    Associated Diagnoses: Atrial fibrillation (HCC)      metoprolol tartrate  (LOPRESSOR) 25 mg tablet Take 1 tablet (25 mg total) by mouth every 12 (twelve) hours  Qty: 60 tablet, Refills: 0    Associated Diagnoses: Atrial fibrillation (HCC)           CONTINUE these medications which have NOT CHANGED    Details   metFORMIN (GLUCOPHAGE) 1000 MG tablet TAKE 1 TABLET BY MOUTH EVERY DAY WITH BREAKFAST  Qty: 90 tablet, Refills: 3    Associated Diagnoses: Type 2 diabetes mellitus without complication, without long-term current use of insulin (HCC)      Multiple Vitamin (MULTI VITAMIN MENS PO) Take by mouth in the morning      LORazepam (ATIVAN) 0.5 mg tablet Take 1 tablet (0.5 mg total) by mouth every 8 (eight) hours as needed for anxiety  Qty: 15 tablet, Refills: 0    Associated Diagnoses: Generalized anxiety disorder           STOP taking these medications       hydrOXYzine HCL (ATARAX) 25 mg tablet Comments:   Reason for Stopping:             No discharge procedures on file.  ED SEPSIS DOCUMENTATION   Time reflects when diagnosis was documented in both MDM as applicable and the Disposition within this note       Time User Action Codes Description Comment    11/25/2024  1:51 PM Silverio Crabtree [I48.91] Atrial fibrillation (HCC)     11/25/2024  4:01 PM Freddy Rowe [I48.91] New onset atrial fibrillation (HCC)                  Silverio Cratbree MD  11/27/24 0992

## 2024-11-25 NOTE — PROGRESS NOTES
Assessment/Plan:    Type 2 diabetes mellitus without complication, without long-term current use of insulin (HCC)  Controlled on current meds.  Lab Results   Component Value Date    HGBA1C 5.3 11/25/2024       Permanent atrial fibrillation (HCC)  Patient with new episode of atrial fibrillation with RVR complaining of shortness of breath which is worse in supine position.  On physical exam heart rate is elevated, cannot exclude bilateral crackles, concern for acute CHF likely due to A-fib.  EKG revealed atrial fibrillation, heart rate on auscultation is about 130 bpm.  Patient will be referred to emergency room, he does not report any chest pain, he said that he will be okay to drive, he will be driving to the hospital right after our visit.  ADT note was placed.       Diagnoses and all orders for this visit:    Type 2 diabetes mellitus without complication, without long-term current use of insulin (HCC)  -     POCT hemoglobin A1c    Permanent atrial fibrillation (HCC)  -     Transfer to other facility          Subjective:      Patient ID: Brando Martinez is a 64 y.o. male.    Patient came today with complaints of 2 weeks shortness of breath and palpitations.    Shortness of Breath  Associated symptoms include coughing. Pertinent negatives include no chest pain, leg swelling, palpitations or wheezing.   Cough  Associated symptoms include shortness of breath. Pertinent negatives include no chest pain, chills, fever or wheezing.       The following portions of the patient's history were reviewed and updated as appropriate: allergies, current medications, past family history, past medical history, past social history, past surgical history, and problem list.    Review of Systems   Constitutional:  Negative for chills and fever.   Respiratory:  Positive for cough and shortness of breath. Negative for wheezing.    Cardiovascular:  Negative for chest pain, palpitations and leg swelling.         Objective:      Pulse (!)  130   Temp (!) 97.3 °F (36.3 °C) (Temporal)   Resp 18   Ht 6' (1.829 m)   Wt (!) 147 kg (324 lb 12.8 oz)   SpO2 98%   BMI 44.05 kg/m²     No Known Allergies       Current Outpatient Medications:     metFORMIN (GLUCOPHAGE) 1000 MG tablet, TAKE 1 TABLET BY MOUTH EVERY DAY WITH BREAKFAST, Disp: 90 tablet, Rfl: 3    Multiple Vitamin (MULTI VITAMIN MENS PO), Take by mouth in the morning, Disp: , Rfl:     hydrochlorothiazide (HYDRODIURIL) 25 mg tablet, Take 1 tablet (25 mg total) by mouth daily (Patient not taking: Reported on 11/25/2024), Disp: 90 tablet, Rfl: 3    hydrOXYzine HCL (ATARAX) 25 mg tablet, Take 1 tablet (25 mg total) by mouth daily as needed for anxiety (Patient not taking: Reported on 11/25/2024), Disp: 30 tablet, Rfl: 1    LORazepam (ATIVAN) 0.5 mg tablet, Take 1 tablet (0.5 mg total) by mouth every 8 (eight) hours as needed for anxiety (Patient not taking: Reported on 11/25/2024), Disp: 15 tablet, Rfl: 0     There are no Patient Instructions on file for this visit.        Physical Exam  Constitutional:       General: He is not in acute distress.     Appearance: He is ill-appearing. He is not toxic-appearing.   Cardiovascular:      Rate and Rhythm: Tachycardia present. Rhythm irregular.      Pulses: Normal pulses.      Heart sounds: Normal heart sounds. No murmur heard.     No friction rub. No gallop.   Pulmonary:      Effort: Pulmonary effort is normal. No respiratory distress.      Breath sounds: Normal breath sounds. No wheezing or rales.   Musculoskeletal:         General: No swelling, tenderness or deformity.      Cervical back: No rigidity. No muscular tenderness.   Neurological:      Mental Status: He is alert and oriented to person, place, and time.      Sensory: No sensory deficit.      Motor: No weakness.   Psychiatric:      Comments: Looks anxious

## 2024-11-25 NOTE — ASSESSMENT & PLAN NOTE
Patient with new episode of atrial fibrillation with RVR complaining of shortness of breath which is worse in supine position.  On physical exam heart rate is elevated, cannot exclude bilateral crackles, concern for acute CHF likely due to A-fib.  EKG revealed atrial fibrillation, heart rate on auscultation is about 130 bpm.  Patient will be referred to emergency room, he does not report any chest pain, he said that he will be okay to drive, he will be driving to the hospital right after our visit.  ADT note was placed.

## 2024-11-26 ENCOUNTER — APPOINTMENT (INPATIENT)
Dept: RADIOLOGY | Facility: HOSPITAL | Age: 64
DRG: 201 | End: 2024-11-26
Payer: COMMERCIAL

## 2024-11-26 LAB
ALBUMIN SERPL BCG-MCNC: 4.2 G/DL (ref 3.5–5)
ALP SERPL-CCNC: 58 U/L (ref 34–104)
ALT SERPL W P-5'-P-CCNC: 22 U/L (ref 7–52)
ANION GAP SERPL CALCULATED.3IONS-SCNC: 6 MMOL/L (ref 4–13)
AST SERPL W P-5'-P-CCNC: 14 U/L (ref 13–39)
BILIRUB SERPL-MCNC: 2.27 MG/DL (ref 0.2–1)
BUN SERPL-MCNC: 16 MG/DL (ref 5–25)
CALCIUM SERPL-MCNC: 9.5 MG/DL (ref 8.4–10.2)
CHLORIDE SERPL-SCNC: 103 MMOL/L (ref 96–108)
CHOLEST SERPL-MCNC: 128 MG/DL (ref ?–200)
CO2 SERPL-SCNC: 31 MMOL/L (ref 21–32)
CREAT SERPL-MCNC: 0.71 MG/DL (ref 0.6–1.3)
ERYTHROCYTE [DISTWIDTH] IN BLOOD BY AUTOMATED COUNT: 15.1 % (ref 11.6–15.1)
GFR SERPL CREATININE-BSD FRML MDRD: 99 ML/MIN/1.73SQ M
GLUCOSE SERPL-MCNC: 113 MG/DL (ref 65–140)
GLUCOSE SERPL-MCNC: 114 MG/DL (ref 65–140)
GLUCOSE SERPL-MCNC: 114 MG/DL (ref 65–140)
GLUCOSE SERPL-MCNC: 117 MG/DL (ref 65–140)
GLUCOSE SERPL-MCNC: 118 MG/DL (ref 65–140)
HCT VFR BLD AUTO: 39.1 % (ref 36.5–49.3)
HDLC SERPL-MCNC: 24 MG/DL
HGB BLD-MCNC: 12.5 G/DL (ref 12–17)
LDLC SERPL CALC-MCNC: 87 MG/DL (ref 0–100)
MCH RBC QN AUTO: 30.9 PG (ref 26.8–34.3)
MCHC RBC AUTO-ENTMCNC: 32 G/DL (ref 31.4–37.4)
MCV RBC AUTO: 97 FL (ref 82–98)
NONHDLC SERPL-MCNC: 104 MG/DL
PLATELET # BLD AUTO: 174 THOUSANDS/UL (ref 149–390)
PMV BLD AUTO: 11.3 FL (ref 8.9–12.7)
POTASSIUM SERPL-SCNC: 4.2 MMOL/L (ref 3.5–5.3)
PROT SERPL-MCNC: 6.9 G/DL (ref 6.4–8.4)
RBC # BLD AUTO: 4.04 MILLION/UL (ref 3.88–5.62)
SODIUM SERPL-SCNC: 140 MMOL/L (ref 135–147)
TRIGL SERPL-MCNC: 86 MG/DL (ref ?–150)
WBC # BLD AUTO: 30.95 THOUSAND/UL (ref 4.31–10.16)

## 2024-11-26 PROCEDURE — 85027 COMPLETE CBC AUTOMATED: CPT | Performed by: INTERNAL MEDICINE

## 2024-11-26 PROCEDURE — 80061 LIPID PANEL: CPT | Performed by: INTERNAL MEDICINE

## 2024-11-26 PROCEDURE — 82948 REAGENT STRIP/BLOOD GLUCOSE: CPT

## 2024-11-26 PROCEDURE — 80053 COMPREHEN METABOLIC PANEL: CPT | Performed by: INTERNAL MEDICINE

## 2024-11-26 PROCEDURE — 71045 X-RAY EXAM CHEST 1 VIEW: CPT

## 2024-11-26 PROCEDURE — 99232 SBSQ HOSP IP/OBS MODERATE 35: CPT | Performed by: INTERNAL MEDICINE

## 2024-11-26 RX ORDER — POTASSIUM CHLORIDE 1500 MG/1
20 TABLET, EXTENDED RELEASE ORAL DAILY
Status: COMPLETED | OUTPATIENT
Start: 2024-11-26 | End: 2024-11-27

## 2024-11-26 RX ORDER — METOPROLOL TARTRATE 25 MG/1
25 TABLET, FILM COATED ORAL EVERY 12 HOURS SCHEDULED
Status: DISCONTINUED | OUTPATIENT
Start: 2024-11-26 | End: 2024-11-28 | Stop reason: HOSPADM

## 2024-11-26 RX ORDER — FUROSEMIDE 10 MG/ML
40 INJECTION INTRAMUSCULAR; INTRAVENOUS
Status: COMPLETED | OUTPATIENT
Start: 2024-11-26 | End: 2024-11-27

## 2024-11-26 RX ADMIN — METOPROLOL TARTRATE 25 MG: 25 TABLET, FILM COATED ORAL at 06:49

## 2024-11-26 RX ADMIN — LORAZEPAM 0.5 MG: 0.5 TABLET ORAL at 21:55

## 2024-11-26 RX ADMIN — APIXABAN 5 MG: 5 TABLET, FILM COATED ORAL at 21:55

## 2024-11-26 RX ADMIN — FUROSEMIDE 40 MG: 10 INJECTION, SOLUTION INTRAVENOUS at 13:14

## 2024-11-26 RX ADMIN — POTASSIUM CHLORIDE 20 MEQ: 1500 TABLET, EXTENDED RELEASE ORAL at 13:14

## 2024-11-26 RX ADMIN — LORAZEPAM 0.5 MG: 0.5 TABLET ORAL at 01:29

## 2024-11-26 RX ADMIN — METOPROLOL TARTRATE 25 MG: 25 TABLET, FILM COATED ORAL at 21:55

## 2024-11-26 RX ADMIN — APIXABAN 5 MG: 5 TABLET, FILM COATED ORAL at 09:04

## 2024-11-26 NOTE — PLAN OF CARE

## 2024-11-26 NOTE — ASSESSMENT & PLAN NOTE
History of diabetes hypertension and anxiety sent over from PCP office for new onset atrial fibrillation  Patient reports over the past week has had orthopnea palpitations and chest discomfort  Patient did lose 140 pounds resulting in better control of his diabetes mellitus but recently gained back about 40 pounds  Given diltiazem in ED with diltiazem infusion currently at 2.5 mg/h  Starting metoprolol tartrate  Check echocardiogram.  Consult cardiology.  CV/WALTER tomorrow  Repeat CXR --> Further diuretics if needed (reports orthopnea still)

## 2024-11-26 NOTE — PLAN OF CARE
Problem: Potential for Falls  Goal: Patient will remain free of falls  Description: INTERVENTIONS:  - Educate patient/family on patient safety including physical limitations  - Instruct patient to call for assistance with activity   - Consult OT/PT to assist with strengthening/mobility   - Keep Call bell within reach  - Keep bed low and locked with side rails adjusted as appropriate  - Keep care items and personal belongings within reach  - Initiate and maintain comfort rounds  - Make Fall Risk Sign visible to staff  - Offer Toileting every 2 Hours, in advance of need  - Initiate/Maintain bed  alarm  - Obtain necessary fall risk management equipment:     - Apply yellow socks and bracelet for high fall risk patients  - Consider moving patient to room near nurses station  Outcome: Progressing

## 2024-11-26 NOTE — ASSESSMENT & PLAN NOTE
Lab Results   Component Value Date    HGBA1C 5.3 11/25/2024     Recent Labs     11/25/24  1522 11/25/24  2121 11/26/24  0806 11/26/24  1120   POCGLU 108 116 114 114     Patient reports much better A1c control after losing 140 pounds  Prior to admission on metformin.  Be held in favor of sliding-scale insulin during hospitalization

## 2024-11-26 NOTE — CASE MANAGEMENT
Case Management Assessment & Discharge Planning Note    Patient name Brando Martinez  Location [facility] /E4 -* MRN [id number]  : 1960 Date 2024       Current Admission Date: 2024  Current Admission Diagnosis:New onset atrial fibrillation (HCC)   Patient Active Problem List    Diagnosis Date Noted Date Diagnosed    New onset atrial fibrillation (HCC) 2024     Morbid obesity with BMI of 40.0-44.9, adult (HCC) 2024     Cheek mass 2023     Leukocytosis 2023     Current every day smoker 04/15/2022     Hx of colonic polyps 2020     Generalized anxiety disorder 2020     Type 2 diabetes mellitus without complication, without long-term current use of insulin ([facility]) 2020     Moderate episode of recurrent major depressive disorder ([facility]) 2017     Class 3 severe obesity due to excess calories with serious comorbidity and body mass index (BMI) of 40.0 to 44.9 in adult (HCC) 2017     History of basal cell carcinoma (BCC) excision 2016     Essential hypertension 2012     Vasovagal syncope 2012       LOS (days): 1  Geometric Mean LOS (GMLOS) (days):   Days to GMLOS:     OBJECTIVE:    Risk of Unplanned Readmission Score: 7.81         Current admission status: Inpatient       [facility] Pharmacy[facility]   [id number] [address] [address]  Phone: [phone] Fax: [phone]    Primary Care Provider: Allen Powell Jr, MD    Primary Insurance: NORRIS FIGUEROA  Secondary Insurance:     ASSESSMENT:  Active Health Care Proxies    There are no active Health Care Proxies on file.       Advance Directives  Does patient have a Health Care POA?: No  Was patient offered paperwork?: Yes (has a  that will assist)  Does patient currently have a Health Care decision maker?: Yes, please see Health Care Proxy section  Does patient have  Advance Directives?: No  Was patient offered paperwork?: Yes (has a  that will assist)  Primary Contact: Dandy graff- best friend              Patient Information  Admitted from:: Home  Mental Status: Alert  During Assessment patient was accompanied by: Not accompanied during assessment  Assessment information provided by:: Patient  Primary Caregiver: Self  Support Systems: Self, Friend  County of Residence: [place]  What [facility] do you live in?: [place]  Type of Current Residence: Bi-level  Upon entering residence, is there a bedroom on the main floor (no further steps)?: No  A bedroom is located on the following floor levels of residence (select all that apply):: 2nd Floor  Upon entering residence, is there a bathroom on the main floor (no further steps)?: No  Number of steps to 2nd floor from main floor: 2  Living Arrangements: Lives w/ Friend  Is patient a ?: No    Activities of Daily Living Prior to Admission  Functional Status: Independent  Completes ADLs independently?: Yes  Ambulates independently?: Yes  Does patient use assisted devices?: No  Does patient currently own DME?: No  Does patient have a history of Outpatient Therapy (PT/OT)?: No  Does the patient have a history of Short-Term Rehab?: No  Does patient have a history of HHC?: No  Does patient currently have HHC?: No         Patient Information Continued  Income Source: Pension/FDC  Does patient have prescription coverage?: Yes  Does patient receive dialysis treatments?: No  Does patient have a history of substance abuse?: No  Does patient have a history of Mental Health Diagnosis?: No (He does at admit that he gets depressed or anxious at times but does not need meds or intervention)         Means of Transportation  Means of Transport to Appts:: Drives Self          DISCHARGE DETAILS:    Discharge planning discussed with:: pt     Comments - Freedom of Choice: Pt plans to discharge to home  CM contacted family/caregiver?: No- see  comments (able to answer for himself.)  Were Treatment Team discharge recommendations reviewed with patient/caregiver?: Yes  Did patient/caregiver verbalize understanding of patient care needs?: Yes  Were patient/caregiver advised of the risks associated with not following Treatment Team discharge recommendations?: Yes    Contacts  Patient Contacts: self  Contact Method: In Person  Reason/Outcome: Continuity of Care, Discharge Planning    Requested Home Health Care         Is the patient interested in HHC at discharge?: No    DME Referral Provided  Referral made for DME?: No         Would you like to participate in our [facility] service program?  : No - Declined    Treatment Team Recommendation: Home  Discharge Destination Plan:: Home  Transport at Discharge : Other (Comment) (Friend)                                      Additional Comments: CM met with the pt and he was made aware of the CM role.  Assessment was done with the pt.  He lives in a bi-level with his best friend.  He still drives and has been independent with all ADLS.  He is here and will plan on getting a cardioversion on 11/27/24. No barriers or post acute needs identified at this time.  CM to follow as needed.

## 2024-11-26 NOTE — PROGRESS NOTES
Progress Note - Hospitalist   Name: Brando Martinez 64 y.o. male I MRN: [id number]  Unit/Bed#: E4 -01 I Date of Admission: 11/25/2024   Date of Service: 11/26/2024 I Hospital Day: 1    Assessment & Plan  New onset atrial fibrillation ([facility])  History of diabetes hypertension and anxiety sent over from PCP office for new onset atrial fibrillation  Patient reports over the past week has had orthopnea palpitations and chest discomfort  Patient did lose 140 pounds resulting in better control of his diabetes mellitus but recently gained back about 40 pounds  Given diltiazem in ED with diltiazem infusion currently at 2.5 mg/h  Starting metoprolol tartrate  Check echocardiogram.  Consult cardiology.  CV/WALTER tomorrow  Repeat CXR --> Further diuretics if needed (reports orthopnea still)  Essential hypertension  Previously on HCTZ also for lower extremity edema  No longer taking.  Starting metoprolol for atrial fibrillation  Type 2 diabetes mellitus without complication, without long-term current use of insulin ([facility])  Lab Results   Component Value Date    HGBA1C 5.3 11/25/2024     Recent Labs     11/25/24  1522 11/25/24  2121 11/26/24  0806 11/26/24  1120   POCGLU 108 116 114 114     Patient reports much better A1c control after losing 140 pounds  Prior to admission on metformin.  Be held in favor of sliding-scale insulin during hospitalization  Generalized anxiety disorder  Will place on lorazepam as needed  Leukocytosis  WBC stable at 30 with predominantly lymphocytes, previously was normal in 2020  CXR with bibasilar opacities which may be layered effusion  No source of infection found  Outpt hematology follow up    Results from last 7 days   Lab Units 11/26/24  0509 11/25/24  1301   WBC Thousand/uL 30.95* 30.26*     Morbid obesity with BMI of 40.0-44.9, adult ([facility])  Body mass index is 44.21 kg/m².  Current every day smoker      VTE Pharmacologic Prophylaxis: VTE Score: 4 Moderate Risk (Score 3-4) - Pharmacological DVT  Prophylaxis Ordered: apixaban (Eliquis).    Mobility:   Basic Mobility Inpatient Raw Score: 24  JH-HLM Goal: 8: Walk 250 feet or more  JH-HLM Achieved: 4: Move to chair/commode  JH-HLM Goal NOT achieved. Continue with multidisciplinary rounding and encourage appropriate mobility to improve upon JH-HLM goals.    Patient Centered Rounds: I performed bedside rounds with nursing staff today.   Discussions with Specialists or Other Care Team Provider: Cardiology    Education and Discussions with Family / Patient: Updated  (daughter) via phone.    Current Length of Stay: 1 day(s)  Current Patient Status: Inpatient   Certification Statement: The patient will continue to require additional inpatient hospital stay due to scheduled cardioversion tomorrow  Discharge Plan: Anticipate discharge in 24-48 hrs to home.    Code Status: Level 1 - Full Code    Subjective   No acute events overnight.  Patient upset that his cardioversion was rescheduled for tomorrow.  Still complains of dyspnea upon laying flat.    Objective :  Temp:  [96.9 °F (36.1 °C)-97.5 °F (36.4 °C)] 97.2 °F (36.2 °C)  HR:  [54-86] 72  BP: (137-175)/() 175/84  Resp:  [18-20] 18  SpO2:  [92 %-97 %] 96 %  O2 Device: None (Room air)  Nasal Cannula O2 Flow Rate (L/min):  [1 L/min] 1 L/min    Body mass index is 44.21 kg/m².     Input and Output Summary (last 24 hours):     Intake/Output Summary (Last 24 hours) at 11/26/2024 1344  Last data filed at 11/25/2024 1959  Gross per 24 hour   Intake --   Output 825 ml   Net -825 ml       Physical Exam      Lines/Drains:        Telemetry:  Telemetry Orders (From admission, onward)               24 Hour Telemetry Monitoring  Continuous x 24 Hours (Telem)        Question:  Reason for 24 Hour Telemetry  Answer:  Arrhythmias requiring acute medical intervention / PPM or ICD malfunction                     Telemetry Reviewed: Atrial fibrillation. HR averaging 60s  Indication for Continued Telemetry Use: No  indication for continued use. Will discontinue.                Lab Results: I have reviewed the following results:   Results from last 7 days   Lab Units 11/26/24  0509 11/25/24  1301   WBC Thousand/uL 30.95* 30.26*   HEMOGLOBIN g/dL 12.5 14.1   HEMATOCRIT % 39.1 45.0   PLATELETS Thousands/uL 174 193   LYMPHO PCT %  --  67*   MONO PCT %  --  3*   EOS PCT %  --  1     Results from last 7 days   Lab Units 11/26/24  0509   SODIUM mmol/L 140   POTASSIUM mmol/L 4.2   CHLORIDE mmol/L 103   CO2 mmol/L 31   BUN mg/dL 16   CREATININE mg/dL 0.71   ANION GAP mmol/L 6   CALCIUM mg/dL 9.5   ALBUMIN g/dL 4.2   TOTAL BILIRUBIN mg/dL 2.27*   ALK PHOS U/L 58   ALT U/L 22   AST U/L 14   GLUCOSE RANDOM mg/dL 113     Results from last 7 days   Lab Units 11/25/24  1301   INR  1.07     Results from last 7 days   Lab Units 11/26/24  1120 11/26/24  0806 11/25/24  2121 11/25/24  1522   POC GLUCOSE mg/dl 114 114 116 108     Results from last 7 days   Lab Units 11/25/24  1201   HEMOGLOBIN A1C  5.3     Results from last 7 days   Lab Units 11/25/24  1301   PROCALCITONIN ng/ml <0.05       Recent Cultures (last 7 days):         Imaging Results Review: No pertinent imaging studies reviewed.  Other Study Results Review: No additional pertinent studies reviewed.    Last 24 Hours Medication List:     Current Facility-Administered Medications:     acetaminophen (TYLENOL) tablet 650 mg, Q4H PRN    apixaban (ELIQUIS) tablet 5 mg, BID    furosemide (LASIX) injection 40 mg, BID (diuretic)    insulin lispro (HumALOG/ADMELOG) 100 units/mL subcutaneous injection 1-6 Units, 4x Daily (AC & HS) **AND** Fingerstick Glucose (POCT), 4x Daily AC and at bedtime    LORazepam (ATIVAN) tablet 0.5 mg, Q8H PRN    metoprolol tartrate (LOPRESSOR) tablet 25 mg, Q12H LYLY    nicotine (NICODERM CQ) 14 mg/24hr TD 24 hr patch 1 patch, Daily    ondansetron (ZOFRAN) injection 4 mg, Q4H PRN    potassium chloride (Klor-Con M20) CR tablet 20 mEq, Daily    Administrative Statements    Today, Patient Was Seen By: Shakeel Saavedra PA-C      **Please Note: This note may have been constructed using a voice recognition system.**

## 2024-11-26 NOTE — PROGRESS NOTES
Cardiology         Progress Note - Cardiology   Brando Martinez 64 y.o. male MRN: [id number]  Unit/Bed#: E4 -01 Encounter: [id number]          Assessment/Recommendations/Discussion:   New onset Afib, RVR  Acute diastolic CHF secondary to above  Current smoker  Obesity      Slow ventricular response on telemetry this morning, down into the 30s and 40s.  Diltiazem infusion now discontinued.  Will decrease Lopressor to 25 mg twice daily and follow heart rate  WALTER/cardioversion tomorrow  Patient continues to have some orthopnea and PND along with exertional dyspnea walking to the bathroom.  Will start Lasix 40 mg IV twice daily around-the-clock  Continue Eliquis anticoagulation for [id number] score of 2  Transthoracic echocardiogram pending  White blood cell count remains significantly elevated, out of proportion to clinical scenario.  Consider infection workup with urinalysis/blood cultures.  If negative and remains persistently elevated consider hematologic workup  Outpt sleep study                Subjective: Patient seen and examined, feels better today but still somewhat short of breath with laying down flat and walking                Physical Exam:  GEN:  NAD, obese  HEENT:  MMM, NCAT, pink conjunctiva, EOMI, nonicteric sclera  CV:  NO JVD/HJR, irregularly irregular, NO M/R/G, +S1/S2, NO PARASTERNAL HEAVE/THRILL, trace LE EDEMA, NO HEPATIC SYSTOLIC PULSATION, WARM EXTREMITIES  RESP:  CTAB/L  ABD:  SOFT, NT, NO GROSS ORGANOMEGALY        Vitals:   BP (!) 175/84 (BP Location: Left arm)   Pulse 72   Temp (!) 97.2 °F (36.2 °C) (Temporal)   Resp 18   Ht 6' (1.829 m)   Wt (!) 148 kg (326 lb)   SpO2 96%   BMI 44.21 kg/m²   Vitals:    11/25/24 1426 11/25/24 1510   Weight: (!) 148 kg (326 lb 4.5 oz) (!) 148 kg (326 lb)       Intake/Output Summary (Last 24 hours) at 11/26/2024 1222  Last data filed at 11/25/2024 1959  Gross per 24 hour   Intake --   Output 825 ml   Net -825 ml       TELEMETRY: Atrial  fibrillation  Lab Results:  Results from last 7 days   Lab Units 11/26/24  0509   WBC Thousand/uL 30.95*   HEMOGLOBIN g/dL 12.5   HEMATOCRIT % 39.1   PLATELETS Thousands/uL 174     Results from last 7 days   Lab Units 11/26/24  0509   POTASSIUM mmol/L 4.2   CHLORIDE mmol/L 103   CO2 mmol/L 31   BUN mg/dL 16   CREATININE mg/dL 0.71   CALCIUM mg/dL 9.5   ALK PHOS U/L 58   ALT U/L 22   AST U/L 14     Results from last 7 days   Lab Units 11/26/24  0509   POTASSIUM mmol/L 4.2   CHLORIDE mmol/L 103   CO2 mmol/L 31   BUN mg/dL 16   CREATININE mg/dL 0.71   CALCIUM mg/dL 9.5           Medications:    Current Facility-Administered Medications:     acetaminophen (TYLENOL) tablet 650 mg, 650 mg, Oral, Q4H PRN, Freddy Rowe DO, 650 mg at 11/25/24 1957    apixaban (ELIQUIS) tablet 5 mg, 5 mg, Oral, BID, Christy Rosa PA-C, 5 mg at 11/26/24 0904    insulin lispro (HumALOG/ADMELOG) 100 units/mL subcutaneous injection 1-6 Units, 1-6 Units, Subcutaneous, 4x Daily (AC & HS) **AND** Fingerstick Glucose (POCT), , , 4x Daily AC and at bedtime, Freddy Rowe DO    LORazepam (ATIVAN) tablet 0.5 mg, 0.5 mg, Oral, Q8H PRN, Freddy Rowe DO, 0.5 mg at 11/26/24 0129    metoprolol tartrate (LOPRESSOR) tablet 25 mg, 25 mg, Oral, Q12H LYLY, Bridger Cunningham DO    nicotine (NICODERM CQ) 14 mg/24hr TD 24 hr patch 1 patch, 1 patch, Transdermal, Daily, Freddy Rowe DO    ondansetron (ZOFRAN) injection 4 mg, 4 mg, Intravenous, Q4H PRN, Freddy Rowe DO    This note was completed in part utilizing M-Modal Fluency Direct Software.  Grammatical errors, random word insertions, spelling mistakes, and incomplete sentences may be an occasional consequence of this system secondary to software limitations, ambient noise, and hardware issues.  If you have any questions or concerns about the content, text, or information contained within the body of this dictation, please contact the provider for clarification.

## 2024-11-26 NOTE — ASSESSMENT & PLAN NOTE
WBC stable at 30 with predominantly lymphocytes, previously was normal in 2020  CXR with bibasilar opacities which may be layered effusion  No source of infection found  Outpt hematology follow up    Results from last 7 days   Lab Units 11/26/24  0509 11/25/24  1301   WBC Thousand/uL 30.95* 30.26*

## 2024-11-26 NOTE — UTILIZATION REVIEW
Initial Clinical Review    Admission: Date/Time/Statement:   Admission Orders (From admission, onward)       Ordered        11/25/24 1351  INPATIENT ADMISSION  Once                          Orders Placed This Encounter   Procedures    INPATIENT ADMISSION     Standing Status:   Standing     Number of Occurrences:   1     Level of Care:   Med Surg [16]     Estimated length of stay:   More than 2 Midnights     Certification:   I certify that inpatient services are medically necessary for this patient for a duration of greater than two midnights. See H&P and MD Progress Notes for additional information about the patient's course of treatment.     ED Arrival Information       Expected   11/25/2024 12:05    Arrival   11/25/2024 12:39    Acuity   Emergent              Means of arrival   Walk-In    Escorted by   Family Member    Service   Hospitalist    Admission type   Emergency              Arrival complaint   diabetes             Chief Complaint   Patient presents with    Shortness of Breath     Pt reports shortness of breath x1 week. Pt denies CP. Reports symptoms worsened with exertion and laying flat. PCP sent pt to ED for new onset afib.        Initial Presentation: 64 y.o. male presents to the ED from PCP office with c/o SOB, orthopnea, worsening JACOBO x 1 wk.  No CP, sleeping in recliner.  PCP found pt to be in new onset A fib.  PMH: obesity, NIDDM, HTN, ESTEVAN, .  In the ED HR was 170, BP elevated.  Labs - elevated BNP, WBC, T bili.  ECG - A fib w/ RVR.  Imaging - poss effusion/ parencymal opacity.  Treated with IV Cardizem bolus and drip.  On exam irreg rhythm,  no other deficits.  Admitted to INPATIENT status with New onset A fib, HTN, leukocytosis - PLAN: Cardio consult, tele, Echo, start Metoprolol Tartrate, Cardizem drip, SSI cover, PRN Lorazepam, check procal, no source of infection.      Anticipated Length of Stay/Certification Statement: Patient will be admitted on an inpatient basis with an anticipated length  of stay of greater than 2 midnights secondary to atrial fibrillation.     11/25 Cardio Consult - new onset A fib w/ RVR, acute dHF, obesity, smoker,  - continue PO Metoprolol, wean off Cardizem drip, NPO p MN for WALTER CV on 11/26 if volume status compensated, echo, start Eliquis, IV Lasix 40 mg x 1.  On exam cannot eval JVD, irreg rhythm, trace BLE edema, central obesity.     Date: 11/26   Day 2:    New onset A fib, HTN, leukocytosis - SVR this AM with rate 30-40s - Cardizem drip d/c. Decrease Lopressor to 25 mg BID, trend HR, will plan WALTER/CV on 11/27, continue IV Lasix 40 mg q 12 hrs, Echo pending. Consider infectious work up as pt remains with leukocytosis unchanged. On exam  pt is SOB when lying flat and walking. No JVD.        ED Treatment-Medication Administration from 11/25/2024 1205 to 11/25/2024 1416         Date/Time Order Dose Route Action     11/25/2024 1306 diltiazem (CARDIZEM) injection 15 mg 15 mg Intravenous Given     11/25/2024 1333 diltiazem (CARDIZEM) 125 mg in sodium chloride 0.9 % 125 mL infusion 2.5 mg/hr Intravenous New Bag            Scheduled Medications:  apixaban, 5 mg, Oral, BID  furosemide, 40 mg, Intravenous, BID (diuretic)  insulin lispro, 1-6 Units, Subcutaneous, 4x Daily (AC & HS)  metoprolol tartrate, 25 mg, Oral, Q12H LYLY  nicotine, 1 patch, Transdermal, Daily  potassium chloride, 20 mEq, Oral, Daily      Continuous IV Infusions:     PRN Meds:  acetaminophen, 650 mg, Oral, Q4H PRN  LORazepam, 0.5 mg, Oral, Q8H PRN  ondansetron, 4 mg, Intravenous, Q4H PRN      ED Triage Vitals   Temperature Pulse Respirations Blood Pressure SpO2 Pain Score   11/25/24 1250 11/25/24 1250 11/25/24 1250 11/25/24 1309 11/25/24 1312 11/25/24 1400   98.2 °F (36.8 °C) (S) (!) 170 22 157/98 93 % No Pain     Weight (last 2 days)       Date/Time Weight    11/25/24 1510 148 (326)    11/25/24 1426 148 (326.28)    11/25/24 1423 148 (326.28)    11/25/24 1250 148 (326.06)            Vital Signs (last 3 days)        Date/Time Temp Pulse Resp BP MAP (mmHg) SpO2 Calculated FIO2 (%) - Nasal Cannula Nasal Cannula O2 Flow Rate (L/min) O2 Device Patient Position - Orthostatic VS [place] Coma Scale Score Pain    11/26/24 1111 97.2 °F (36.2 °C) 72 18 175/84 119 96 % -- -- None (Room air) Sitting -- --    11/26/24 0900 -- -- -- -- -- -- -- -- -- -- 15 --    11/26/24 0758 97.1 °F (36.2 °C) 56 18 146/97 114 97 % -- -- Nasal cannula Sitting -- --    11/26/24 0649 -- 54 -- 148/94 -- -- -- -- -- -- -- --    11/26/24 0245 97 °F (36.1 °C) 63 18 151/80 109 96 % 24 1 L/min Nasal cannula Sitting -- --    11/25/24 2230 97.4 °F (36.3 °C) 64 18 137/89 100 94 % -- -- None (Room air) Lying -- --    11/25/24 1957 -- -- -- -- -- -- -- -- -- -- -- 5    11/25/24 1915 -- -- -- -- -- -- -- -- -- -- 15 No Pain    11/25/24 1906 97.2 °F (36.2 °C) 85 18 142/89 111 94 % -- -- None (Room air) Lying -- --    11/25/24 1510 97.5 °F (36.4 °C) 81 18 173/104 131 94 % -- -- None (Room air) Lying -- --    11/25/24 1426 96.9 °F (36.1 °C) 82 18 175/109 137 95 % -- -- -- -- -- --    11/25/24 1425 -- -- -- -- -- -- -- -- -- -- -- No Pain    11/25/24 1423 96.9 °F (36.1 °C) 79 18 -- -- 92 % -- -- -- -- -- --    11/25/24 1400 -- 86 20 157/77 111 94 % -- -- None (Room air) Lying 15 No Pain    11/25/24 1345 -- 86 18 153/76 108 94 % -- -- None (Room air) Lying -- --    11/25/24 1333 -- 86 -- 164/91 -- -- -- -- -- -- -- --    11/25/24 1312 -- -- -- -- -- 93 % -- -- None (Room air) -- -- --    11/25/24 1309 -- -- -- 157/98 -- -- -- -- -- -- -- --    11/25/24 1250 98.2 °F (36.8 °C) 170  22 -- -- -- -- -- -- -- -- --    11/25/24 1246 -- -- -- -- -- -- -- -- -- -- 15 --              Pertinent Labs/Diagnostic Test Results:   Radiology:  XR chest 1 view portable   Final Interpretation by Everette Corrales MD (11/25 9349)      There is hazy bibasilar opacity which may represent a combination of layered effusion and parenchymal opacity.            Workstation performed: [id number]            Cardiology:  Echo complete w/ contrast if indicated   Final Result by Bridger Cunningham DO (11/25 5745)        Left Ventricle: Left ventricular cavity size is normal. Wall thickness    is moderately increased. The left ventricular ejection fraction is 55%.    Systolic function is normal. Wall motion is normal.     Left Atrium: The atrium is severely dilated.     Right Atrium: The atrium is mildly dilated.     Aortic Valve: There is aortic valve sclerosis.     Mitral Valve: There is moderate annular calcification. There is mild    regurgitation.         ECG 12 lead   Final Result by Bridger Cunningham DO (11/25 2355)   Atrial fibrillation with rapid ventricular response   Rightward axis   Nonspecific ST and T wave abnormality   Abnormal ECG   No previous ECGs available   Confirmed by Bridger Cunningham ([id number]) on 11/25/2024 4:15:44 PM        GI:  No orders to display           Results from last 7 days   Lab Units 11/26/24  0509 11/25/24  1301   WBC Thousand/uL 30.95* 30.26*   HEMOGLOBIN g/dL 12.5 14.1   HEMATOCRIT % 39.1 45.0   PLATELETS Thousands/uL 174 193         Results from last 7 days   Lab Units 11/26/24  0509 11/25/24  1301   SODIUM mmol/L 140 138   POTASSIUM mmol/L 4.2 4.2   CHLORIDE mmol/L 103 101   CO2 mmol/L 31 30   ANION GAP mmol/L 6 7   BUN mg/dL 16 18   CREATININE mg/dL 0.71 0.76   EGFR ml/min/1.73sq m 99 96   CALCIUM mg/dL 9.5 10.3*     Results from last 7 days   Lab Units 11/26/24  0509 11/25/24  1301   AST U/L 14 19   ALT U/L 22 28   ALK PHOS U/L 58 71   TOTAL PROTEIN g/dL 6.9 7.6   ALBUMIN g/dL 4.2 4.7   TOTAL BILIRUBIN mg/dL 2.27* 2.02*     Results from last 7 days   Lab Units 11/26/24  1120 11/26/24  0806 11/25/24  2121 11/25/24  1522   POC GLUCOSE mg/dl 114 114 116 108     Results from last 7 days   Lab Units 11/26/24  0509 11/25/24  1301   GLUCOSE RANDOM mg/dL 113 127         Results from last 7 days   Lab Units 11/25/24  1201   HEMOGLOBIN A1C  5.3     Results from last 7 days   Lab Units 11/25/24  1514  11/25/24  1301   HS TNI 0HR ng/L  --  8   HS TNI 2HR ng/L 7  --    HSTNI D2 ng/L -1  --          Results from last 7 days   Lab Units 11/25/24  1301   PROTIME seconds 14.1   INR  1.07   PTT seconds 30     Results from last 7 days   Lab Units 11/25/24  1301   TSH 3RD GENERATON uIU/mL 4.277     Results from last 7 days   Lab Units 11/25/24  1301   PROCALCITONIN ng/ml <0.05           Results from last 7 days   Lab Units 11/25/24  1301   BNP pg/mL 289*     Past Medical History:   Diagnosis Date    Anxiety     last asessed 06Nov2012    Depression     last assessed 29Jan2013    Hemangioma of liver     Of liver - emnolized 2012;  last assessed 19Aug2014    History of basal cell cancer     basal cell skin ca on nose    Hyperlipidemia     Polyp of sigmoid colon 09/01/2012    x 1  9/12     Present on Admission:   Essential hypertension   Generalized anxiety disorder   Leukocytosis   Type 2 diabetes mellitus without complication, without long-term current use of insulin (HCC)   New onset atrial fibrillation (HCC)   Current every day smoker      Admitting Diagnosis: Atrial fibrillation (HCC) [I48.91]  Diabetes (HCC) [E11.9]  Age/Sex: 64 y.o. male    Network Utilization Review Department  ATTENTION: Please call with any questions or concerns to [phone] and carefully listen to the prompts so that you are directed to the right person. All voicemails are confidential.   For Discharge needs, contact Care Management DC Support Team at [phone] opt. [phone]  Send all requests for admission clinical reviews, approved or denied determinations and any other requests to dedicated fax number below belonging to the campus where the patient is receiving treatment. List of dedicated fax numbers for the Facilities:  FACILITY NAME UR FAX NUMBER   ADMISSION DENIALS (Administrative/Medical Necessity) [phone]   DISCHARGE SUPPORT TEAM (NETWORK) [phone]   PARENT CHILD HEALTH (Maternity/NICU/Pediatrics) [phone]   [facility]  [facility] [phone]   [facility] [phone]   [facility] [phone]   [facility] [phone]   [facility] [phone]   [facility] [phone]   [facility] [phone]   [facility] [phone]   [facility] [phone]   [facility] [phone]   [facility] [phone]   [facility] [phone]

## 2024-11-27 ENCOUNTER — ANESTHESIA EVENT (INPATIENT)
Dept: NON INVASIVE DIAGNOSTICS | Facility: HOSPITAL | Age: 64
DRG: 201 | End: 2024-11-27
Payer: COMMERCIAL

## 2024-11-27 ENCOUNTER — APPOINTMENT (INPATIENT)
Dept: NON INVASIVE DIAGNOSTICS | Facility: HOSPITAL | Age: 64
DRG: 201 | End: 2024-11-27
Attending: INTERNAL MEDICINE
Payer: COMMERCIAL

## 2024-11-27 LAB
ANION GAP SERPL CALCULATED.3IONS-SCNC: 6 MMOL/L (ref 4–13)
AORTIC ROOT: 4 CM
ASCENDING AORTA: 4.1 CM
ATRIAL RATE: 54 BPM
ATRIAL RATE: 55 BPM
BUN SERPL-MCNC: 15 MG/DL (ref 5–25)
CALCIUM SERPL-MCNC: 9.8 MG/DL (ref 8.4–10.2)
CHLORIDE SERPL-SCNC: 103 MMOL/L (ref 96–108)
CO2 SERPL-SCNC: 30 MMOL/L (ref 21–32)
CREAT SERPL-MCNC: 0.78 MG/DL (ref 0.6–1.3)
ERYTHROCYTE [DISTWIDTH] IN BLOOD BY AUTOMATED COUNT: 14.7 % (ref 11.6–15.1)
GFR SERPL CREATININE-BSD FRML MDRD: 95 ML/MIN/1.73SQ M
GLUCOSE SERPL-MCNC: 109 MG/DL (ref 65–140)
GLUCOSE SERPL-MCNC: 114 MG/DL (ref 65–140)
GLUCOSE SERPL-MCNC: 119 MG/DL (ref 65–140)
GLUCOSE SERPL-MCNC: 119 MG/DL (ref 65–140)
GLUCOSE SERPL-MCNC: 141 MG/DL (ref 65–140)
HCT VFR BLD AUTO: 42.5 % (ref 36.5–49.3)
HGB BLD-MCNC: 13.3 G/DL (ref 12–17)
MAGNESIUM SERPL-MCNC: 2.1 MG/DL (ref 1.9–2.7)
MCH RBC QN AUTO: 29.4 PG (ref 26.8–34.3)
MCHC RBC AUTO-ENTMCNC: 31.3 G/DL (ref 31.4–37.4)
MCV RBC AUTO: 94 FL (ref 82–98)
P AXIS: -37 DEGREES
P AXIS: 0 DEGREES
PLATELET # BLD AUTO: 183 THOUSANDS/UL (ref 149–390)
PMV BLD AUTO: 10.9 FL (ref 8.9–12.7)
POTASSIUM SERPL-SCNC: 4.1 MMOL/L (ref 3.5–5.3)
PR INTERVAL: 196 MS
PR INTERVAL: 210 MS
QRS AXIS: 77 DEGREES
QRS AXIS: 86 DEGREES
QRSD INTERVAL: 94 MS
QRSD INTERVAL: 98 MS
QT INTERVAL: 430 MS
QT INTERVAL: 460 MS
QTC INTERVAL: 408 MS
QTC INTERVAL: 440 MS
RBC # BLD AUTO: 4.52 MILLION/UL (ref 3.88–5.62)
SL CV LV EF: 55
SODIUM SERPL-SCNC: 139 MMOL/L (ref 135–147)
T WAVE AXIS: 15 DEGREES
T WAVE AXIS: 22 DEGREES
VENTRICULAR RATE: 54 BPM
VENTRICULAR RATE: 55 BPM
WBC # BLD AUTO: 30.02 THOUSAND/UL (ref 4.31–10.16)

## 2024-11-27 PROCEDURE — 93312 ECHO TRANSESOPHAGEAL: CPT

## 2024-11-27 PROCEDURE — 93320 DOPPLER ECHO COMPLETE: CPT | Performed by: STUDENT IN AN ORGANIZED HEALTH CARE EDUCATION/TRAINING PROGRAM

## 2024-11-27 PROCEDURE — 93325 DOPPLER ECHO COLOR FLOW MAPG: CPT | Performed by: STUDENT IN AN ORGANIZED HEALTH CARE EDUCATION/TRAINING PROGRAM

## 2024-11-27 PROCEDURE — 83735 ASSAY OF MAGNESIUM: CPT | Performed by: INTERNAL MEDICINE

## 2024-11-27 PROCEDURE — 92960 CARDIOVERSION ELECTRIC EXT: CPT

## 2024-11-27 PROCEDURE — 93005 ELECTROCARDIOGRAM TRACING: CPT

## 2024-11-27 PROCEDURE — 80048 BASIC METABOLIC PNL TOTAL CA: CPT | Performed by: INTERNAL MEDICINE

## 2024-11-27 PROCEDURE — 82948 REAGENT STRIP/BLOOD GLUCOSE: CPT

## 2024-11-27 PROCEDURE — 99232 SBSQ HOSP IP/OBS MODERATE 35: CPT | Performed by: STUDENT IN AN ORGANIZED HEALTH CARE EDUCATION/TRAINING PROGRAM

## 2024-11-27 PROCEDURE — B246ZZ4 ULTRASONOGRAPHY OF RIGHT AND LEFT HEART, TRANSESOPHAGEAL: ICD-10-PCS | Performed by: STUDENT IN AN ORGANIZED HEALTH CARE EDUCATION/TRAINING PROGRAM

## 2024-11-27 PROCEDURE — 93010 ELECTROCARDIOGRAM REPORT: CPT | Performed by: STUDENT IN AN ORGANIZED HEALTH CARE EDUCATION/TRAINING PROGRAM

## 2024-11-27 PROCEDURE — 5A2204Z RESTORATION OF CARDIAC RHYTHM, SINGLE: ICD-10-PCS | Performed by: STUDENT IN AN ORGANIZED HEALTH CARE EDUCATION/TRAINING PROGRAM

## 2024-11-27 PROCEDURE — 85027 COMPLETE CBC AUTOMATED: CPT | Performed by: PHYSICIAN ASSISTANT

## 2024-11-27 PROCEDURE — 92960 CARDIOVERSION ELECTRIC EXT: CPT | Performed by: STUDENT IN AN ORGANIZED HEALTH CARE EDUCATION/TRAINING PROGRAM

## 2024-11-27 PROCEDURE — 99232 SBSQ HOSP IP/OBS MODERATE 35: CPT | Performed by: PHYSICIAN ASSISTANT

## 2024-11-27 PROCEDURE — 93312 ECHO TRANSESOPHAGEAL: CPT | Performed by: STUDENT IN AN ORGANIZED HEALTH CARE EDUCATION/TRAINING PROGRAM

## 2024-11-27 RX ORDER — GLYCOPYRROLATE 0.2 MG/ML
INJECTION INTRAMUSCULAR; INTRAVENOUS AS NEEDED
Status: DISCONTINUED | OUTPATIENT
Start: 2024-11-27 | End: 2024-11-27

## 2024-11-27 RX ORDER — SODIUM CHLORIDE 9 MG/ML
INJECTION, SOLUTION INTRAVENOUS CONTINUOUS PRN
Status: DISCONTINUED | OUTPATIENT
Start: 2024-11-27 | End: 2024-11-27

## 2024-11-27 RX ORDER — PROPOFOL 10 MG/ML
INJECTION, EMULSION INTRAVENOUS AS NEEDED
Status: DISCONTINUED | OUTPATIENT
Start: 2024-11-27 | End: 2024-11-27

## 2024-11-27 RX ORDER — KETAMINE HCL IN NACL, ISO-OSM 100MG/10ML
SYRINGE (ML) INJECTION AS NEEDED
Status: DISCONTINUED | OUTPATIENT
Start: 2024-11-27 | End: 2024-11-27

## 2024-11-27 RX ORDER — EPHEDRINE SULFATE 50 MG/ML
INJECTION INTRAVENOUS AS NEEDED
Status: DISCONTINUED | OUTPATIENT
Start: 2024-11-27 | End: 2024-11-27

## 2024-11-27 RX ORDER — PHENYLEPHRINE HCL IN 0.9% NACL 1 MG/10 ML
SYRINGE (ML) INTRAVENOUS AS NEEDED
Status: DISCONTINUED | OUTPATIENT
Start: 2024-11-27 | End: 2024-11-27

## 2024-11-27 RX ORDER — METOPROLOL TARTRATE 25 MG/1
25 TABLET, FILM COATED ORAL EVERY 12 HOURS SCHEDULED
Qty: 60 TABLET | Refills: 0 | Status: SHIPPED | OUTPATIENT
Start: 2024-11-27

## 2024-11-27 RX ORDER — FUROSEMIDE 20 MG/1
20 TABLET ORAL DAILY
Qty: 30 TABLET | Refills: 0 | Status: SHIPPED | OUTPATIENT
Start: 2024-11-28

## 2024-11-27 RX ORDER — FUROSEMIDE 20 MG/1
20 TABLET ORAL DAILY
Status: DISCONTINUED | OUTPATIENT
Start: 2024-11-28 | End: 2024-11-28 | Stop reason: HOSPADM

## 2024-11-27 RX ADMIN — Medication 200 MCG: at 10:27

## 2024-11-27 RX ADMIN — Medication 30 MG: at 10:15

## 2024-11-27 RX ADMIN — METOPROLOL TARTRATE 25 MG: 25 TABLET, FILM COATED ORAL at 20:59

## 2024-11-27 RX ADMIN — POTASSIUM CHLORIDE 20 MEQ: 1500 TABLET, EXTENDED RELEASE ORAL at 08:34

## 2024-11-27 RX ADMIN — APIXABAN 5 MG: 5 TABLET, FILM COATED ORAL at 20:59

## 2024-11-27 RX ADMIN — Medication 20 MG: at 10:23

## 2024-11-27 RX ADMIN — SODIUM CHLORIDE: 0.9 INJECTION, SOLUTION INTRAVENOUS at 09:49

## 2024-11-27 RX ADMIN — FUROSEMIDE 40 MG: 10 INJECTION, SOLUTION INTRAVENOUS at 17:09

## 2024-11-27 RX ADMIN — EPHEDRINE SULFATE 10 MG: 50 INJECTION INTRAVENOUS at 10:48

## 2024-11-27 RX ADMIN — PROPOFOL 80 MG: 10 INJECTION, EMULSION INTRAVENOUS at 10:14

## 2024-11-27 RX ADMIN — METOPROLOL TARTRATE 25 MG: 25 TABLET, FILM COATED ORAL at 08:34

## 2024-11-27 RX ADMIN — Medication 100 MCG: at 10:22

## 2024-11-27 RX ADMIN — Medication 200 MCG: at 10:30

## 2024-11-27 RX ADMIN — EPHEDRINE SULFATE 10 MG: 50 INJECTION INTRAVENOUS at 10:52

## 2024-11-27 RX ADMIN — GLYCOPYRROLATE 0.1 MG: 0.2 INJECTION, SOLUTION INTRAMUSCULAR; INTRAVENOUS at 10:14

## 2024-11-27 RX ADMIN — FUROSEMIDE 40 MG: 10 INJECTION, SOLUTION INTRAVENOUS at 08:34

## 2024-11-27 RX ADMIN — LORAZEPAM 0.5 MG: 0.5 TABLET ORAL at 20:59

## 2024-11-27 RX ADMIN — APIXABAN 5 MG: 5 TABLET, FILM COATED ORAL at 08:34

## 2024-11-27 RX ADMIN — PROPOFOL 80 MCG/KG/MIN: 10 INJECTION, EMULSION INTRAVENOUS at 10:16

## 2024-11-27 RX ADMIN — Medication 200 MCG: at 10:41

## 2024-11-27 NOTE — ASSESSMENT & PLAN NOTE
WBC stable at 30 with predominantly lymphocytes, previously was normal in 2020  CXR with bibasilar opacities which may be layered effusion  No source of infection found  Outpt hematology follow up    Results from last 7 days   Lab Units 11/27/24  0535 11/26/24  0509 11/25/24  1301   WBC Thousand/uL 30.02* 30.95* 30.26*

## 2024-11-27 NOTE — PLAN OF CARE
Problem: Potential for Falls  Goal: Patient will remain free of falls  Description: INTERVENTIONS:  - Educate patient/family on patient safety including physical limitations  - Instruct patient to call for assistance with activity   - Consult OT/PT to assist with strengthening/mobility   - Keep Call bell within reach  - Keep bed low and locked with side rails adjusted as appropriate  - Keep care items and personal belongings within reach  - Initiate and maintain comfort rounds  - Make Fall Risk Sign visible to staff  - Offer Toileting every  Hours, in advance of need  - Initiate/Maintain alarm  - Obtain necessary fall risk management equipment:   - Apply yellow socks and bracelet for high fall risk patients  - Consider moving patient to room near nurses station  Outcome: Progressing     Problem: PAIN - ADULT  Goal: Verbalizes/displays adequate comfort level or baseline comfort level  Description: Interventions:  - Encourage patient to monitor pain and request assistance  - Assess pain using appropriate pain scale  - Administer analgesics based on type and severity of pain and evaluate response  - Implement non-pharmacological measures as appropriate and evaluate response  - Consider cultural and social influences on pain and pain management  - Notify physician/advanced practitioner if interventions unsuccessful or patient reports new pain  Outcome: Progressing     Problem: INFECTION - ADULT  Goal: Absence or prevention of progression during hospitalization  Description: INTERVENTIONS:  - Assess and monitor for signs and symptoms of infection  - Monitor lab/diagnostic results  - Monitor all insertion sites, i.e. indwelling lines, tubes, and drains  - Monitor endotracheal if appropriate and nasal secretions for changes in amount and color  - [place] appropriate cooling/warming therapies per order  - Administer medications as ordered  - Instruct and encourage patient and family to use good hand hygiene technique  -  Identify and instruct in appropriate isolation precautions for identified infection/condition  Outcome: Progressing  Goal: Absence of fever/infection during neutropenic period  Description: INTERVENTIONS:  - Monitor WBC    Outcome: Progressing     Problem: SAFETY ADULT  Goal: Patient will remain free of falls  Description: INTERVENTIONS:  - Educate patient/family on patient safety including physical limitations  - Instruct patient to call for assistance with activity   - Consult OT/PT to assist with strengthening/mobility   - Keep Call bell within reach  - Keep bed low and locked with side rails adjusted as appropriate  - Keep care items and personal belongings within reach  - Initiate and maintain comfort rounds  - Make Fall Risk Sign visible to staff  - Offer Toileting every  Hours, in advance of need  - Initiate/Maintain alarm  - Obtain necessary fall risk management equipment:   - Apply yellow socks and bracelet for high fall risk patients  - Consider moving patient to room near nurses station  Outcome: Progressing  Goal: Maintain or return to baseline ADL function  Description: INTERVENTIONS:  -  Assess patient's ability to carry out ADLs; assess patient's baseline for ADL function and identify physical deficits which impact ability to perform ADLs (bathing, care of mouth/teeth, toileting, grooming, dressing, etc.)  - Assess/evaluate cause of self-care deficits   - Assess range of motion  - Assess patient's mobility; develop plan if impaired  - Assess patient's need for assistive devices and provide as appropriate  - Encourage maximum independence but intervene and supervise when necessary  - Involve family in performance of ADLs  - Assess for home care needs following discharge   - Consider OT consult to assist with ADL evaluation and planning for discharge  - Provide patient education as appropriate  Outcome: Progressing  Goal: Maintains/Returns to pre admission functional level  Description:  INTERVENTIONS:  - Perform AM-PAC 6 Click Basic Mobility/ Daily Activity assessment daily.  - Set and communicate daily mobility goal to care team and patient/family/caregiver.   - Collaborate with rehabilitation services on mobility goals if consulted  - Perform Range of Motion  times a day.  - Reposition patient every  hours.  - Dangle patient  times a day  - Stand patient  times a day  - Ambulate patient  times a day  - Out of bed to chair  times a day   - Out of bed for meals  times a day  - Out of bed for toileting  - Record patient progress and toleration of activity level   Outcome: Progressing     Problem: DISCHARGE PLANNING  Goal: Discharge to home or other facility with appropriate resources  Description: INTERVENTIONS:  - Identify barriers to discharge w/patient and caregiver  - Arrange for needed discharge resources and transportation as appropriate  - Identify discharge learning needs (meds, wound care, etc.)  - Arrange for interpretive services to assist at discharge as needed  - Refer to Case Management Department for coordinating discharge planning if the patient needs post-hospital services based on physician/advanced practitioner order or complex needs related to functional status, cognitive ability, or social support system  Outcome: Progressing     Problem: Knowledge Deficit  Goal: Patient/family/caregiver demonstrates understanding of disease process, treatment plan, medications, and discharge instructions  Description: Complete learning assessment and assess knowledge base.  Interventions:  - Provide teaching at level of understanding  - Provide teaching via preferred learning methods  Outcome: Progressing     Problem: Potential for Falls  Goal: Patient will remain free of falls  Description: INTERVENTIONS:  - Educate patient/family on patient safety including physical limitations  - Instruct patient to call for assistance with activity   - Consult OT/PT to assist with strengthening/mobility   -  Keep Call bell within reach  - Keep bed low and locked with side rails adjusted as appropriate  - Keep care items and personal belongings within reach  - Initiate and maintain comfort rounds  - Make Fall Risk Sign visible to staff  - Offer Toileting every  Hours, in advance of need  - Initiate/Maintain alarm  - Obtain necessary fall risk management equipment:   - Apply yellow socks and bracelet for high fall risk patients  - Consider moving patient to room near nurses station  Outcome: Progressing     Problem: PAIN - ADULT  Goal: Verbalizes/displays adequate comfort level or baseline comfort level  Description: Interventions:  - Encourage patient to monitor pain and request assistance  - Assess pain using appropriate pain scale  - Administer analgesics based on type and severity of pain and evaluate response  - Implement non-pharmacological measures as appropriate and evaluate response  - Consider cultural and social influences on pain and pain management  - Notify physician/advanced practitioner if interventions unsuccessful or patient reports new pain  Outcome: Progressing     Problem: INFECTION - ADULT  Goal: Absence or prevention of progression during hospitalization  Description: INTERVENTIONS:  - Assess and monitor for signs and symptoms of infection  - Monitor lab/diagnostic results  - Monitor all insertion sites, i.e. indwelling lines, tubes, and drains  - Monitor endotracheal if appropriate and nasal secretions for changes in amount and color  - [place] appropriate cooling/warming therapies per order  - Administer medications as ordered  - Instruct and encourage patient and family to use good hand hygiene technique  - Identify and instruct in appropriate isolation precautions for identified infection/condition  Outcome: Progressing  Goal: Absence of fever/infection during neutropenic period  Description: INTERVENTIONS:  - Monitor WBC    Outcome: Progressing     Problem: SAFETY ADULT  Goal: Patient will  remain free of falls  Description: INTERVENTIONS:  - Educate patient/family on patient safety including physical limitations  - Instruct patient to call for assistance with activity   - Consult OT/PT to assist with strengthening/mobility   - Keep Call bell within reach  - Keep bed low and locked with side rails adjusted as appropriate  - Keep care items and personal belongings within reach  - Initiate and maintain comfort rounds  - Make Fall Risk Sign visible to staff  - Offer Toileting every  Hours, in advance of need  - Initiate/Maintain alarm  - Obtain necessary fall risk management equipment:   - Apply yellow socks and bracelet for high fall risk patients  - Consider moving patient to room near nurses station  Outcome: Progressing  Goal: Maintain or return to baseline ADL function  Description: INTERVENTIONS:  -  Assess patient's ability to carry out ADLs; assess patient's baseline for ADL function and identify physical deficits which impact ability to perform ADLs (bathing, care of mouth/teeth, toileting, grooming, dressing, etc.)  - Assess/evaluate cause of self-care deficits   - Assess range of motion  - Assess patient's mobility; develop plan if impaired  - Assess patient's need for assistive devices and provide as appropriate  - Encourage maximum independence but intervene and supervise when necessary  - Involve family in performance of ADLs  - Assess for home care needs following discharge   - Consider OT consult to assist with ADL evaluation and planning for discharge  - Provide patient education as appropriate  Outcome: Progressing  Goal: Maintains/Returns to pre admission functional level  Description: INTERVENTIONS:  - Perform AM-PAC 6 Click Basic Mobility/ Daily Activity assessment daily.  - Set and communicate daily mobility goal to care team and patient/family/caregiver.   - Collaborate with rehabilitation services on mobility goals if consulted  - Perform Range of Motion  times a day.  - Reposition  patient every  hours.  - Dangle patient  times a day  - Stand patient  times a day  - Ambulate patient  times a day  - Out of bed to chair  times a day   - Out of bed for meals  times a day  - Out of bed for toileting  - Record patient progress and toleration of activity level   Outcome: Progressing     Problem: DISCHARGE PLANNING  Goal: Discharge to home or other facility with appropriate resources  Description: INTERVENTIONS:  - Identify barriers to discharge w/patient and caregiver  - Arrange for needed discharge resources and transportation as appropriate  - Identify discharge learning needs (meds, wound care, etc.)  - Arrange for interpretive services to assist at discharge as needed  - Refer to Case Management Department for coordinating discharge planning if the patient needs post-hospital services based on physician/advanced practitioner order or complex needs related to functional status, cognitive ability, or social support system  Outcome: Progressing     Problem: Knowledge Deficit  Goal: Patient/family/caregiver demonstrates understanding of disease process, treatment plan, medications, and discharge instructions  Description: Complete learning assessment and assess knowledge base.  Interventions:  - Provide teaching at level of understanding  - Provide teaching via preferred learning methods  Outcome: Progressing     Problem: Potential for Falls  Goal: Patient will remain free of falls  Description: INTERVENTIONS:  - Educate patient/family on patient safety including physical limitations  - Instruct patient to call for assistance with activity   - Consult OT/PT to assist with strengthening/mobility   - Keep Call bell within reach  - Keep bed low and locked with side rails adjusted as appropriate  - Keep care items and personal belongings within reach  - Initiate and maintain comfort rounds  - Make Fall Risk Sign visible to staff  - Offer Toileting every  Hours, in advance of need  - Initiate/Maintain  alarm  - Obtain necessary fall risk management equipment:   - Apply yellow socks and bracelet for high fall risk patients  - Consider moving patient to room near nurses station  Outcome: Progressing     Problem: PAIN - ADULT  Goal: Verbalizes/displays adequate comfort level or baseline comfort level  Description: Interventions:  - Encourage patient to monitor pain and request assistance  - Assess pain using appropriate pain scale  - Administer analgesics based on type and severity of pain and evaluate response  - Implement non-pharmacological measures as appropriate and evaluate response  - Consider cultural and social influences on pain and pain management  - Notify physician/advanced practitioner if interventions unsuccessful or patient reports new pain  Outcome: Progressing     Problem: INFECTION - ADULT  Goal: Absence or prevention of progression during hospitalization  Description: INTERVENTIONS:  - Assess and monitor for signs and symptoms of infection  - Monitor lab/diagnostic results  - Monitor all insertion sites, i.e. indwelling lines, tubes, and drains  - Monitor endotracheal if appropriate and nasal secretions for changes in amount and color  - [place] appropriate cooling/warming therapies per order  - Administer medications as ordered  - Instruct and encourage patient and family to use good hand hygiene technique  - Identify and instruct in appropriate isolation precautions for identified infection/condition  Outcome: Progressing  Goal: Absence of fever/infection during neutropenic period  Description: INTERVENTIONS:  - Monitor WBC    Outcome: Progressing     Problem: SAFETY ADULT  Goal: Patient will remain free of falls  Description: INTERVENTIONS:  - Educate patient/family on patient safety including physical limitations  - Instruct patient to call for assistance with activity   - Consult OT/PT to assist with strengthening/mobility   - Keep Call bell within reach  - Keep bed low and locked with side  rails adjusted as appropriate  - Keep care items and personal belongings within reach  - Initiate and maintain comfort rounds  - Make Fall Risk Sign visible to staff  - Offer Toileting every  Hours, in advance of need  - Initiate/Maintain alarm  - Obtain necessary fall risk management equipment:   - Apply yellow socks and bracelet for high fall risk patients  - Consider moving patient to room near nurses station  Outcome: Progressing  Goal: Maintain or return to baseline ADL function  Description: INTERVENTIONS:  -  Assess patient's ability to carry out ADLs; assess patient's baseline for ADL function and identify physical deficits which impact ability to perform ADLs (bathing, care of mouth/teeth, toileting, grooming, dressing, etc.)  - Assess/evaluate cause of self-care deficits   - Assess range of motion  - Assess patient's mobility; develop plan if impaired  - Assess patient's need for assistive devices and provide as appropriate  - Encourage maximum independence but intervene and supervise when necessary  - Involve family in performance of ADLs  - Assess for home care needs following discharge   - Consider OT consult to assist with ADL evaluation and planning for discharge  - Provide patient education as appropriate  Outcome: Progressing  Goal: Maintains/Returns to pre admission functional level  Description: INTERVENTIONS:  - Perform AM-PAC 6 Click Basic Mobility/ Daily Activity assessment daily.  - Set and communicate daily mobility goal to care team and patient/family/caregiver.   - Collaborate with rehabilitation services on mobility goals if consulted  - Perform Range of Motion  times a day.  - Reposition patient every  hours.  - Dangle patient  times a day  - Stand patient  times a day  - Ambulate patient  times a day  - Out of bed to chair  times a day   - Out of bed for meals  times a day  - Out of bed for toileting  - Record patient progress and toleration of activity level   Outcome: Progressing      Problem: DISCHARGE PLANNING  Goal: Discharge to home or other facility with appropriate resources  Description: INTERVENTIONS:  - Identify barriers to discharge w/patient and caregiver  - Arrange for needed discharge resources and transportation as appropriate  - Identify discharge learning needs (meds, wound care, etc.)  - Arrange for interpretive services to assist at discharge as needed  - Refer to Case Management Department for coordinating discharge planning if the patient needs post-hospital services based on physician/advanced practitioner order or complex needs related to functional status, cognitive ability, or social support system  Outcome: Progressing     Problem: Knowledge Deficit  Goal: Patient/family/caregiver demonstrates understanding of disease process, treatment plan, medications, and discharge instructions  Description: Complete learning assessment and assess knowledge base.  Interventions:  - Provide teaching at level of understanding  - Provide teaching via preferred learning methods  Outcome: Progressing     Problem: Potential for Falls  Goal: Patient will remain free of falls  Description: INTERVENTIONS:  - Educate patient/family on patient safety including physical limitations  - Instruct patient to call for assistance with activity   - Consult OT/PT to assist with strengthening/mobility   - Keep Call bell within reach  - Keep bed low and locked with side rails adjusted as appropriate  - Keep care items and personal belongings within reach  - Initiate and maintain comfort rounds  - Make Fall Risk Sign visible to staff  - Offer Toileting every  Hours, in advance of need  - Initiate/Maintain alarm  - Obtain necessary fall risk management equipment:   - Apply yellow socks and bracelet for high fall risk patients  - Consider moving patient to room near nurses station  Outcome: Progressing     Problem: PAIN - ADULT  Goal: Verbalizes/displays adequate comfort level or baseline comfort  level  Description: Interventions:  - Encourage patient to monitor pain and request assistance  - Assess pain using appropriate pain scale  - Administer analgesics based on type and severity of pain and evaluate response  - Implement non-pharmacological measures as appropriate and evaluate response  - Consider cultural and social influences on pain and pain management  - Notify physician/advanced practitioner if interventions unsuccessful or patient reports new pain  Outcome: Progressing     Problem: INFECTION - ADULT  Goal: Absence or prevention of progression during hospitalization  Description: INTERVENTIONS:  - Assess and monitor for signs and symptoms of infection  - Monitor lab/diagnostic results  - Monitor all insertion sites, i.e. indwelling lines, tubes, and drains  - Monitor endotracheal if appropriate and nasal secretions for changes in amount and color  - [place] appropriate cooling/warming therapies per order  - Administer medications as ordered  - Instruct and encourage patient and family to use good hand hygiene technique  - Identify and instruct in appropriate isolation precautions for identified infection/condition  Outcome: Progressing  Goal: Absence of fever/infection during neutropenic period  Description: INTERVENTIONS:  - Monitor WBC    Outcome: Progressing     Problem: SAFETY ADULT  Goal: Patient will remain free of falls  Description: INTERVENTIONS:  - Educate patient/family on patient safety including physical limitations  - Instruct patient to call for assistance with activity   - Consult OT/PT to assist with strengthening/mobility   - Keep Call bell within reach  - Keep bed low and locked with side rails adjusted as appropriate  - Keep care items and personal belongings within reach  - Initiate and maintain comfort rounds  - Make Fall Risk Sign visible to staff  - Offer Toileting every  Hours, in advance of need  - Initiate/Maintain alarm  - Obtain necessary fall risk management  equipment:   - Apply yellow socks and bracelet for high fall risk patients  - Consider moving patient to room near nurses station  Outcome: Progressing  Goal: Maintain or return to baseline ADL function  Description: INTERVENTIONS:  -  Assess patient's ability to carry out ADLs; assess patient's baseline for ADL function and identify physical deficits which impact ability to perform ADLs (bathing, care of mouth/teeth, toileting, grooming, dressing, etc.)  - Assess/evaluate cause of self-care deficits   - Assess range of motion  - Assess patient's mobility; develop plan if impaired  - Assess patient's need for assistive devices and provide as appropriate  - Encourage maximum independence but intervene and supervise when necessary  - Involve family in performance of ADLs  - Assess for home care needs following discharge   - Consider OT consult to assist with ADL evaluation and planning for discharge  - Provide patient education as appropriate  Outcome: Progressing  Goal: Maintains/Returns to pre admission functional level  Description: INTERVENTIONS:  - Perform AM-PAC 6 Click Basic Mobility/ Daily Activity assessment daily.  - Set and communicate daily mobility goal to care team and patient/family/caregiver.   - Collaborate with rehabilitation services on mobility goals if consulted  - Perform Range of Motion  times a day.  - Reposition patient every  hours.  - Dangle patient  times a day  - Stand patient  times a day  - Ambulate patient  times a day  - Out of bed to chair  times a day   - Out of bed for meals  times a day  - Out of bed for toileting  - Record patient progress and toleration of activity level   Outcome: Progressing     Problem: DISCHARGE PLANNING  Goal: Discharge to home or other facility with appropriate resources  Description: INTERVENTIONS:  - Identify barriers to discharge w/patient and caregiver  - Arrange for needed discharge resources and transportation as appropriate  - Identify discharge  learning needs (meds, wound care, etc.)  - Arrange for interpretive services to assist at discharge as needed  - Refer to Case Management Department for coordinating discharge planning if the patient needs post-hospital services based on physician/advanced practitioner order or complex needs related to functional status, cognitive ability, or social support system  Outcome: Progressing     Problem: Knowledge Deficit  Goal: Patient/family/caregiver demonstrates understanding of disease process, treatment plan, medications, and discharge instructions  Description: Complete learning assessment and assess knowledge base.  Interventions:  - Provide teaching at level of understanding  - Provide teaching via preferred learning methods  Outcome: Progressing

## 2024-11-27 NOTE — ANESTHESIA POSTPROCEDURE EVALUATION
Post-Op Assessment Note    CV Status:  Stable    Pain management: adequate       Mental Status:  Alert and awake   Hydration Status:  Euvolemic   PONV Controlled:  Controlled   Airway Patency:  Patent     Post Op Vitals Reviewed: Yes    No anethesia notable event occurred.    Staff: CRNA           Last Filed PACU Vitals:  Vitals Value Taken Time   Temp 97.3    Pulse 60 11/27/24 1102   /73 11/27/24 1101   Resp 10 11/27/24 1102   SpO2 92 % 11/27/24 1102   Vitals shown include unfiled device data.    Modified Kaden:  Activity: 2 (11/27/2024 11:21 AM)  Respiration: 2 (11/27/2024 11:21 AM)  Circulation: 2 (11/27/2024 11:21 AM)  Consciousness: 2 (11/27/2024 11:21 AM)  Oxygen Saturation: 1 (11/27/2024 11:21 AM)  Modified Kaden Score: 9 (11/27/2024 11:21 AM)

## 2024-11-27 NOTE — ANESTHESIA PREPROCEDURE EVALUATION
Procedure:  WALTER    Relevant Problems   ANESTHESIA (within normal limits)      CARDIO   (+) Essential hypertension   (+) New onset atrial fibrillation (HCC)      ENDO   (+) Type 2 diabetes mellitus without complication, without long-term current use of insulin (HCC)      GI/HEPATIC  Obesity      /RENAL (within normal limits)      HEMATOLOGY (within normal limits)      MUSCULOSKELETAL (within normal limits)      NEURO/PSYCH   (+) Generalized anxiety disorder   (+) Moderate episode of recurrent major depressive disorder (HCC)      PULMONARY   (+) Current every day smoker        Physical Exam    Airway  Comment: Left submandibular lipoma, asymptomatic  Mallampati score: III  TM Distance: >3 FB  Neck ROM: full     Dental   No notable dental hx     Cardiovascular  Rhythm: irregular, Rate: normal, Cardiovascular exam normal    Pulmonary  Pulmonary exam normal Breath sounds clear to auscultation    Other Findings  post-pubertal.      Anesthesia Plan  ASA Score- 3     Anesthesia Type- IV sedation with anesthesia with ASA Monitors.         Additional Monitors:     Airway Plan:     Comment: Brando Martinez is a 64 y.o. male with PMHx significant for HTN, new onset afib, obesity, anxiety, current smoker presenting today for WALTER, cardioversion    Plan: MAC sedation, GA w/ LMA backup, PIVx1, standard ASA monitors. Spontaneous respirations with supplemental O2 via nasal cannula vs. Simple face mask.    Anesthesia MAC sedation plan and consent discussed with patient who expressed understanding and agreement. Risks/benefits and alternatives discussed with patient including possible PONV, airway obstruction requiring ventilation augmentation, regurgitant aspiration, awareness, damage to teeth/lips/gums and possibility of rare anesthetic and procedural emergencies requiring emergent airway management. Plan discussed and confirmed with CRNA.        .       Plan Factors-Exercise tolerance (METS): >4 METS.    Chart reviewed. EKG  reviewed. Imaging results reviewed. Existing labs reviewed. Patient summary reviewed.    Patient is a current smoker.  Patient instructed to abstain from smoking on day of procedure. Patient did not smoke on day of surgery.    Obstructive sleep apnea risk education given perioperatively.        Induction- intravenous.    Postoperative Plan-     Perioperative Resuscitation Plan - Level 1 - Full Code.       Informed Consent- Anesthetic plan and risks discussed with patient.  I personally reviewed this patient with the CRNA. Discussed and agreed on the Anesthesia Plan with the CRNA..

## 2024-11-27 NOTE — ASSESSMENT & PLAN NOTE
Lab Results   Component Value Date    HGBA1C 5.3 11/25/2024     Recent Labs     11/26/24  1554 11/26/24 2054 11/27/24  0740 11/27/24  1225   POCGLU 118 117 119 109     Patient reports much better A1c control after losing 140 pounds  Prior to admission on metformin.  Be held in favor of sliding-scale insulin during hospitalization

## 2024-11-27 NOTE — PROGRESS NOTES
Progress Note - Hospitalist   Name: Brando Martinez 64 y.o. male I MRN: [id number]  Unit/Bed#: E4 -01 I Date of Admission: 11/25/2024   Date of Service: 11/27/2024 I Hospital Day: 2    Assessment & Plan  New onset atrial fibrillation (HCC)  History of diabetes hypertension and anxiety sent over from PCP office for new onset atrial fibrillation  Patient reports over the past week has had orthopnea palpitations and chest discomfort  Patient did lose 140 pounds resulting in better control of his diabetes mellitus but recently gained back about 40 pounds  Given diltiazem in ED with diltiazem infusion currently at 2.5 mg/h  Starting metoprolol tartrate  Status post cardioversion  Repeat CXR with mild pulmonary edema, continue IV diuretics today with transition to p.o. tomorrow  Essential hypertension  Previously on HCTZ also for lower extremity edema  No longer taking.  Starting metoprolol for atrial fibrillation  Type 2 diabetes mellitus without complication, without long-term current use of insulin ([facility])  Lab Results   Component Value Date    HGBA1C 5.3 11/25/2024     Recent Labs     11/26/24  1554 11/26/24  2054 11/27/24  0740 11/27/24  1225   POCGLU 118 117 119 109     Patient reports much better A1c control after losing 140 pounds  Prior to admission on metformin.  Be held in favor of sliding-scale insulin during hospitalization  Generalized anxiety disorder  Will place on lorazepam as needed  Leukocytosis  WBC stable at 30 with predominantly lymphocytes, previously was normal in 2020  CXR with bibasilar opacities which may be layered effusion  No source of infection found  Outpt hematology follow up    Results from last 7 days   Lab Units 11/27/24  0535 11/26/24  0509 11/25/24  1301   WBC Thousand/uL 30.02* 30.95* 30.26*     Morbid obesity with BMI of 40.0-44.9, adult ([facility])  Body mass index is 44.21 kg/m².  Current every day smoker  Encourage cessation    VTE Pharmacologic Prophylaxis: VTE Score: 4 Moderate  Risk (Score 3-4) - Pharmacological DVT Prophylaxis Ordered: apixaban (Eliquis).    Mobility:   Basic Mobility Inpatient Raw Score: 24  JH-HLM Goal: 8: Walk 250 feet or more  JH-HLM Achieved: 8: Walk 250 feet ot more  JH-HLM Goal achieved. Continue to encourage appropriate mobility.    Patient Centered Rounds: I performed bedside rounds with nursing staff today.   Discussions with Specialists or Other Care Team Provider: Cardiology    Education and Discussions with Family / Patient: Patient declined call to .     Current Length of Stay: 2 day(s)  Current Patient Status: Inpatient   Certification Statement: The patient will continue to require additional inpatient hospital stay due to cardiac monitoring post cardioversion  Discharge Plan: Anticipate discharge tomorrow to home.    Code Status: Level 1 - Full Code    Subjective   No acute events overnight. S/p     Objective :  Temp:  [97 °F (36.1 °C)-98 °F (36.7 °C)] 97.6 °F (36.4 °C)  HR:  [52-83] 56  BP: (112-187)/() 146/85  Resp:  [16-20] 18  SpO2:  [90 %-94 %] 94 %  O2 Device: Nasal cannula  Nasal Cannula O2 Flow Rate (L/min):  [2 L/min] 2 L/min    Body mass index is 44.21 kg/m².     Input and Output Summary (last 24 hours):     Intake/Output Summary (Last 24 hours) at 11/27/2024 1324  Last data filed at 11/27/2024 1058  Gross per 24 hour   Intake 1220 ml   Output --   Net 1220 ml       Physical Exam  Vitals and nursing note reviewed.   Constitutional:       Appearance: Normal appearance.   HENT:      Head: Normocephalic and atraumatic.      Mouth/Throat:      Mouth: Mucous membranes are moist.      Pharynx: Oropharynx is clear. No oropharyngeal exudate.   Eyes:      Extraocular Movements: Extraocular movements intact.   Cardiovascular:      Rate and Rhythm: Normal rate and regular rhythm.      Pulses: Normal pulses.      Heart sounds: Normal heart sounds. No murmur heard.     No friction rub. No gallop.   Pulmonary:      Effort: Pulmonary effort  is normal. No respiratory distress.      Breath sounds: Normal breath sounds. No stridor. No wheezing or rales.   Abdominal:      General: Abdomen is flat. Bowel sounds are normal. There is no distension.      Palpations: Abdomen is soft.      Tenderness: There is no abdominal tenderness.   Musculoskeletal:      Right lower leg: No edema.      Left lower leg: No edema.   Skin:     General: Skin is warm and dry.   Neurological:      General: No focal deficit present.      Mental Status: He is alert and oriented to person, place, and time.           Lines/Drains:              Lab Results: I have reviewed the following results:   Results from last 7 days   Lab Units 11/27/24  0535 11/26/24  0509 11/25/24  1301   WBC Thousand/uL 30.02*   < > 30.26*   HEMOGLOBIN g/dL 13.3   < > 14.1   HEMATOCRIT % 42.5   < > 45.0   PLATELETS Thousands/uL 183   < > 193   LYMPHO PCT %  --   --  67*   MONO PCT %  --   --  3*   EOS PCT %  --   --  1    < > = values in this interval not displayed.     Results from last 7 days   Lab Units 11/27/24  0535 11/26/24  0509   SODIUM mmol/L 139 140   POTASSIUM mmol/L 4.1 4.2   CHLORIDE mmol/L 103 103   CO2 mmol/L 30 31   BUN mg/dL 15 16   CREATININE mg/dL 0.78 0.71   ANION GAP mmol/L 6 6   CALCIUM mg/dL 9.8 9.5   ALBUMIN g/dL  --  4.2   TOTAL BILIRUBIN mg/dL  --  2.27*   ALK PHOS U/L  --  58   ALT U/L  --  22   AST U/L  --  14   GLUCOSE RANDOM mg/dL 114 113     Results from last 7 days   Lab Units 11/25/24  1301   INR  1.07     Results from last 7 days   Lab Units 11/27/24  1225 11/27/24  0740 11/26/24  2054 11/26/24  1554 11/26/24  1120 11/26/24  0806 11/25/24  2121 11/25/24  1522   POC GLUCOSE mg/dl 109 119 117 118 114 114 116 108     Results from last 7 days   Lab Units 11/25/24  1201   HEMOGLOBIN A1C  5.3     Results from last 7 days   Lab Units 11/25/24  1301   PROCALCITONIN ng/ml <0.05       Recent Cultures (last 7 days):         Imaging Results Review: No pertinent imaging studies  reviewed.  Other Study Results Review: No additional pertinent studies reviewed.    Last 24 Hours Medication List:     Current Facility-Administered Medications:     acetaminophen (TYLENOL) tablet 650 mg, Q4H PRN    apixaban (ELIQUIS) tablet 5 mg, BID    furosemide (LASIX) injection 40 mg, BID (diuretic)    [START ON 11/28/2024] furosemide (LASIX) tablet 20 mg, Daily    insulin lispro (HumALOG/ADMELOG) 100 units/mL subcutaneous injection 1-6 Units, 4x Daily (AC & HS) **AND** Fingerstick Glucose (POCT), 4x Daily AC and at bedtime    LORazepam (ATIVAN) tablet 0.5 mg, Q8H PRN    metoprolol tartrate (LOPRESSOR) tablet 25 mg, Q12H LYLY    nicotine (NICODERM CQ) 14 mg/24hr TD 24 hr patch 1 patch, Daily    ondansetron (ZOFRAN) injection 4 mg, Q4H PRN    Administrative Statements   Today, Patient Was Seen By: Shakeel Saavedra PA-C      **Please Note: This note may have been constructed using a voice recognition system.**

## 2024-11-27 NOTE — ASSESSMENT & PLAN NOTE
History of diabetes hypertension and anxiety sent over from PCP office for new onset atrial fibrillation  Patient reports over the past week has had orthopnea palpitations and chest discomfort  Patient did lose 140 pounds resulting in better control of his diabetes mellitus but recently gained back about 40 pounds  Given diltiazem in ED with diltiazem infusion currently at 2.5 mg/h  Starting metoprolol tartrate  Status post cardioversion  Repeat CXR with mild pulmonary edema, continue IV diuretics today with transition to p.o. tomorrow

## 2024-11-27 NOTE — PLAN OF CARE

## 2024-11-27 NOTE — PROGRESS NOTES
Cardiology Progress Note - Brando Martinez 64 y.o. male MRN: [id number]    Unit/Bed#: E4 -01 Encounter: [id number]      Assessment & Plan:    New onset atrial fibrillation (HCC)  - Presented with new onset A-fib with RVR  - Not on Eliquis 5 mg twice daily for anticoagulation  - Rate control with Lopressor 25 mg twice daily  - Initially also on diltiazem drip, however discontinued due to bradycardia - underwent successful WALTER cardioversion 11/27/2024    Acute diastolic CHF  - Likely provoked by new onset atrial fibrillation  - TTE 11/25/2024 showed EF 55%, severe LAE, mild RA, AV sclerosis, mild MR  - Not on diuretics prior to admission  - Current diuretic Rx furosemide 40 mg IV twice daily    Essential hypertension  - Not on antihypertensives prior to admission  - Continue with Lopressor 25 mg twice daily    Type 2 diabetes mellitus without complication, without long-term current use of insulin ([facility])    Generalized anxiety disorder    Current every day smoker  - Encourage smoking sensation    Leukocytosis  - Outpatient follow-up with hematology    Morbid obesity with BMI of 40.0-44.9, adult ([facility])    Summary:  - Underwent successful WALTER cardioversion this morning and now back in sinus rhythm  - Continue with Eliquis 5 mg twice daily and Lopressor 25 mg twice daily  -Volume status appears improved  - Will give final dose of furosemide 40 mg IV this evening and then changed to furosemide 20 mg daily tomorrow  - Monitor on telemetry of overnight for recurrent A-fib  - Check daily standing weights  - Monitor creatinine and electrolytes closely  - Should hopefully be ready for discharge from a cardiac standpoint tomorrow if labs and vitals remained stable    Subjective:   No significant events overnight.  He reports feeling well currently.  Reports sleeping better last night.  Denies chest pain, shortness of breath, orthopnea, abdominal pain, nausea, vomiting, fever, chills, headache, dizziness or  palpitations.    Objective:     Vitals: Blood pressure (!) 187/95, pulse 83, temperature 98 °F (36.7 °C), temperature source Temporal, resp. rate 18, height 6' (1.829 m), weight (!) 148 kg (326 lb), SpO2 92%., Body mass index is 44.21 kg/m².,   Orthostatic Blood Pressures      Flowsheet Row Most Recent Value   Blood Pressure 187/95 filed at 11/27/2024 0748   Patient Position - Orthostatic VS Lying filed at 11/27/2024 0748              Intake/Output Summary (Last 24 hours) at 11/27/2024 1100  Last data filed at 11/27/2024 1058  Gross per 24 hour   Intake 1220 ml   Output --   Net 1220 ml           Physical Exam:    GEN: Brando Martinez appears well, alert and oriented x 3, pleasant and cooperative   HEENT: Mucous membranes moist, no scleral icterus, no conjunctival pallor  NECK: No elevated JVD  HEART: Regular rate and rhythm, normal S1 and S2, no significant audible murmur  LUNGS: clear to auscultation bilaterally; no wheezes, rales, or rhonchi   ABDOMEN: normal bowel sounds, soft, no tenderness, no distention  EXTREMITIES: peripheral pulses normal; no lower extremity edema   NEURO: no focal findings   SKIN: No lesions or rashes on exposed skin        Current Facility-Administered Medications:     acetaminophen (TYLENOL) tablet 650 mg, 650 mg, Oral, Q4H PRN, Freddy Rowe DO, 650 mg at 11/25/24 1957    apixaban (ELIQUIS) tablet 5 mg, 5 mg, Oral, BID, Christy Rosa PA-C, 5 mg at 11/27/24 0834    furosemide (LASIX) injection 40 mg, 40 mg, Intravenous, BID (diuretic), Rujul Cunningham, DO, 40 mg at 11/27/24 0834    insulin lispro (HumALOG/ADMELOG) 100 units/mL subcutaneous injection 1-6 Units, 1-6 Units, Subcutaneous, 4x Daily (AC & HS) **AND** Fingerstick Glucose (POCT), , , 4x Daily AC and at bedtime, Freddy Rowe DO    LORazepam (ATIVAN) tablet 0.5 mg, 0.5 mg, Oral, Q8H PRN, Freddy Rowe DO, 0.5 mg at 11/26/24 2207    metoprolol tartrate (LOPRESSOR) tablet 25 mg, 25 mg, Oral, Q12H LYLY, Bridger Cunningham DO, 25 mg at  "11/27/24 0834    nicotine (NICODERM CQ) 14 mg/24hr TD 24 hr patch 1 patch, 1 patch, Transdermal, Daily, Freddy Rowe DO    ondansetron (ZOFRAN) injection 4 mg, 4 mg, Intravenous, Q4H PRN, Freddy Rowe DO    Labs & Results:    No results found for: \"CKTOTAL\", \"CKMB\", \"CKMBINDEX\", \"TROPONINI\"    Lab Results   Component Value Date    GLUCOSE 125 08/22/2014    CALCIUM 9.8 11/27/2024     08/22/2014    K 4.1 11/27/2024    CO2 30 11/27/2024     11/27/2024    BUN 15 11/27/2024    CREATININE 0.78 11/27/2024       Lab Results   Component Value Date    WBC 30.02 (H) 11/27/2024    HGB 13.3 11/27/2024    HCT 42.5 11/27/2024    MCV 94 11/27/2024     11/27/2024     Results from last 7 days   Lab Units 11/25/24  1301   INR  1.07       Lab Results   Component Value Date    CHOL 217 08/22/2014     Lab Results   Component Value Date    HDL 24 (L) 11/26/2024    HDL 39 (L) 01/20/2020     Lab Results   Component Value Date    LDLCALC 87 11/26/2024    LDLCALC 116 (H) 01/20/2020     Lab Results   Component Value Date    TRIG 86 11/26/2024    TRIG 217 (H) 01/20/2020       Lab Results   Component Value Date    ALT 22 11/26/2024    AST 14 11/26/2024    ALKPHOS 58 11/26/2024         EKG personally reviewed by )Oleg Oneal MD. No acute changes   TELE: Patient remained in A-fib overnight, no significant arrhythmias           "

## 2024-11-27 NOTE — ANESTHESIA POSTPROCEDURE EVALUATION
Post-Op Assessment Note    CV Status:  Stable    Pain management: adequate       Mental Status:  Alert and awake   Hydration Status:  Euvolemic   PONV Controlled:  Controlled   Airway Patency:  Patent     Post Op Vitals Reviewed: Yes    No anethesia notable event occurred.    Staff: CRNA           Last Filed PACU Vitals:  Vitals Value Taken Time   Temp 97.3    Pulse 60 11/27/24 1102   /73 11/27/24 1101   Resp 10 11/27/24 1102   SpO2 92 % 11/27/24 1102   Vitals shown include unfiled device data.    Modified Kaden:  No data recorded

## 2024-11-28 VITALS
TEMPERATURE: 97.1 F | SYSTOLIC BLOOD PRESSURE: 152 MMHG | BODY MASS INDEX: 42.66 KG/M2 | WEIGHT: 315 LBS | DIASTOLIC BLOOD PRESSURE: 82 MMHG | HEART RATE: 59 BPM | OXYGEN SATURATION: 96 % | RESPIRATION RATE: 18 BRPM | HEIGHT: 72 IN

## 2024-11-28 PROBLEM — E87.70 FLUID OVERLOAD: Status: ACTIVE | Noted: 2024-11-28

## 2024-11-28 LAB
ERYTHROCYTE [DISTWIDTH] IN BLOOD BY AUTOMATED COUNT: 15.1 % (ref 11.6–15.1)
GLUCOSE SERPL-MCNC: 106 MG/DL (ref 65–140)
HCT VFR BLD AUTO: 40.7 % (ref 36.5–49.3)
HGB BLD-MCNC: 12.7 G/DL (ref 12–17)
MCH RBC QN AUTO: 30.2 PG (ref 26.8–34.3)
MCHC RBC AUTO-ENTMCNC: 31.2 G/DL (ref 31.4–37.4)
MCV RBC AUTO: 97 FL (ref 82–98)
PLATELET # BLD AUTO: 175 THOUSANDS/UL (ref 149–390)
PMV BLD AUTO: 10.9 FL (ref 8.9–12.7)
RBC # BLD AUTO: 4.2 MILLION/UL (ref 3.88–5.62)
WBC # BLD AUTO: 30.35 THOUSAND/UL (ref 4.31–10.16)

## 2024-11-28 PROCEDURE — 82948 REAGENT STRIP/BLOOD GLUCOSE: CPT

## 2024-11-28 PROCEDURE — 85027 COMPLETE CBC AUTOMATED: CPT | Performed by: PHYSICIAN ASSISTANT

## 2024-11-28 PROCEDURE — 99239 HOSP IP/OBS DSCHRG MGMT >30: CPT | Performed by: PHYSICIAN ASSISTANT

## 2024-11-28 PROCEDURE — 99232 SBSQ HOSP IP/OBS MODERATE 35: CPT

## 2024-11-28 RX ADMIN — METOPROLOL TARTRATE 25 MG: 25 TABLET, FILM COATED ORAL at 08:55

## 2024-11-28 RX ADMIN — FUROSEMIDE 20 MG: 20 TABLET ORAL at 08:55

## 2024-11-28 RX ADMIN — APIXABAN 5 MG: 5 TABLET, FILM COATED ORAL at 08:55

## 2024-11-28 NOTE — PROGRESS NOTES
"Progress Note - Cardiology   Name: Brando Martinez 64 y.o. male I MRN: [id number]  Unit/Bed#: E4 -01 I Date of Admission: 11/25/2024   Date of Service: 11/28/2024 I Hospital Day: 3  Assessment & Plan  New onset atrial fibrillation (HCC)  S/p successful WALTER/ CV 11/27  Continue Lopressor 25 mg q12h & Eliquis 5 mg BID  Weight loss, T2DM management & smoking cessation will be his biggest goals to avoid A-fib in the future.  Essential hypertension  Not on antihypertensive at home  DC on Lopressor & Lasix, can change to Toprol as OP if he needs more BP control  Current every day smoker  Encouraged cessation  Fluid overload  Had some fluid overload in Afib RVR on admission  Treated with IV Lasix now on Lasix 20 mg QD, continue on dc  Get a standing weight today prior to dc please  Might not need lasix for the long term. Monitor daily weights at home.    Subjective   Pt reports no sob, no orthopnea, \"I have lady legs now\" reports improved peripheral edema.    Objective :  Temp:  [97.1 °F (36.2 °C)-98.1 °F (36.7 °C)] 97.1 °F (36.2 °C)  HR:  [52-77] 59  BP: (111-152)/(58-95) 152/82  Resp:  [16-20] 18  SpO2:  [90 %-96 %] 96 %  O2 Device: None (Room air)  Nasal Cannula O2 Flow Rate (L/min):  [2 L/min] 2 L/min    Vitals:    11/25/24 1510 11/27/24 1148   Weight: (!) 148 kg (326 lb) (!) 148 kg (326 lb)     Physical Exam  Vitals and nursing note reviewed.   Constitutional:       General: He is not in acute distress.  HENT:      Head: Normocephalic and atraumatic.      Nose: No congestion.      Mouth/Throat:      Mouth: Mucous membranes are moist.   Eyes:      Conjunctiva/sclera: Conjunctivae normal.   Cardiovascular:      Rate and Rhythm: Normal rate and regular rhythm.      Heart sounds: S1 normal and S2 normal. No murmur heard.  Pulmonary:      Effort: Pulmonary effort is normal.      Breath sounds: No wheezing, rhonchi or rales.   Abdominal:      Comments: + central obesity   Musculoskeletal:         General: No swelling. "      Cervical back: Normal range of motion.   Skin:     General: Skin is warm and dry.   Neurological:      Mental Status: He is alert and oriented to person, place, and time.   Psychiatric:         Behavior: Behavior normal.     Lab Results: I have reviewed the following results:  CBC today  Telemetry- NSR with average HR 56 bpm    Christy Rosa PA-C

## 2024-11-28 NOTE — DISCHARGE SUMMARY
Discharge Summary - Hospitalist   Name: Brando Martinez 64 y.o. male I MRN: [id number]  Unit/Bed#: E4 -01 I Date of Admission: 11/25/2024   Date of Service: 11/28/2024 I Hospital Day: 3     Assessment & Plan  New onset atrial fibrillation (HCC)  History of diabetes hypertension and anxiety sent over from PCP office for new onset atrial fibrillation  Patient reports over the past week has had orthopnea palpitations and chest discomfort  Patient did lose 140 pounds resulting in better control of his diabetes mellitus but recently gained back about 40 pounds  S/p Cardizem gtt., now discontinued.  Status post cardioversion successfully  Discharged on metoprolol 25 mg twice daily and Lasix 20 mg daily  Essential hypertension  Previously on HCTZ also for lower extremity edema  No longer taking.  Starting metoprolol for atrial fibrillation  Type 2 diabetes mellitus without complication, without long-term current use of insulin (Formerly [facility])  Lab Results   Component Value Date    HGBA1C 5.3 11/25/2024     Recent Labs     11/27/24  1225 11/27/24  1637 11/27/24  2132 11/28/24  0717   POCGLU 109 119 141* 106     Patient reports much better A1c control after losing 140 pounds  Prior to admission on metformin.  Be held in favor of sliding-scale insulin during hospitalization  Generalized anxiety disorder  Will place on lorazepam as needed  Leukocytosis  WBC stable at 30 with predominantly lymphocytes, previously was normal in 2020  CXR with bibasilar opacities which may be layered effusion  No source of infection found  Outpt hematology follow up    Results from last 7 days   Lab Units 11/28/24  0509 11/27/24  0535 11/26/24  0509 11/25/24  1301   WBC Thousand/uL 30.35* 30.02* 30.95* 30.26*     Morbid obesity with BMI of 40.0-44.9, adult (Formerly [facility])  Body mass index is 44.21 kg/m².  Current every day smoker  Encourage cessation     Medical Problems       Resolved Problems  Date Reviewed: 2/21/2024   None       Discharging Physician /  Practitioner: Sahkeel Saavedra PA-C  PCP: Allen Powell Jr, MD  Admission Date:   Admission Orders (From admission, onward)       Ordered        11/25/24 1351  INPATIENT ADMISSION  Once                          Discharge Date: 11/28/24    Consultations During Hospital Stay:  Cardiology    Procedures Performed:   WALTER/cardioversion    Significant Findings / Test Results:   XR chest portable  Result Date: 11/26/2024  Impression: Bibasilar base opacities which likely represent some combination of pleural effusions and/or atelectasis. Developing pneumonia may present a similar appearance in the appropriate clinical setting. Right-sided opacity is not appreciably changed while the left-sided opacity appears slightly improved. Mild pulmonary vascular congestion is suggested, similar to radiograph of the previous day. Resident: Domonique Lazo I, the attending radiologist, have reviewed the images and agree with the final report above. Workstation performed: [id number]     XR chest 1 view portable  Result Date: 11/25/2024  Impression: There is hazy bibasilar opacity which may represent a combination of layered effusion and parenchymal opacity. Workstation performed: [id number]     WALTER EF 55%, mild to moderate MR, no patent foramen ovale    Incidental Findings:   WBC 30, given referral to outpatient hematology for follow-up    Test Results Pending at Discharge (will require follow up):   None     Outpatient Tests Requested:  Hematology follow-up    Complications: None    Reason for Admission: Shortness of breath, orthopnea    Hospital Course:   Brando Martinez is a 64 y.o. male patient with PMH obesity, T2DM, HTN who originally presented to the hospital on 11/25/2024 due to shortness of breath and orthopnea, worsening.  Sleeping in a recliner.  Went to see his PCP and found to be in new onset A-fib was instructed to come to the hospital.  On arrival to the ED, HR 160s requiring Cardizem gtt. and initiation of  metoprolol.  Unfortunately, the following day, patient had an episode of bradycardia and his scheduled cardioversion was postponed to the following day.  Eventually underwent cardioversion successfully and was monitored for additional 24 hours, remained in normal sinus rhythm.  He was instructed to lower his caffeine intake as well as quit smoking.  He is to continue with Lasix, metoprolol, Eliquis and follow-up with cardiology.    Of note, patient has had a persistently elevated leukocytosis with WBC 30, predominantly lymphocytes.  This likely represents CLL and he was provided with a referral to hematology on discharge.  This was briefly addressed with the patient.      Please see above list of diagnoses and related plan for additional information.     Condition at Discharge: stable    Discharge Day Visit / Exam:   Subjective: No acute events overnight.  Patient reports feeling well.  Denies any orthopnea now.  Ambulating without any dyspnea, chest pain, palpitations.  Vitals: Blood Pressure: 152/82 (11/28/24 0742)  Pulse: 59 (11/28/24 0742)  Temperature: (!) 97.1 °F (36.2 °C) (11/28/24 0742)  Temp Source: Temporal (11/28/24 0742)  Respirations: 18 (11/28/24 0742)  Height: 6' (182.9 cm) (11/27/24 1148)  Weight - Scale: (!) 148 kg (326 lb) (11/27/24 1148)  SpO2: 96 % (11/28/24 0742)  Physical Exam  Vitals and nursing note reviewed.   Constitutional:       Appearance: Normal appearance.   HENT:      Head: Normocephalic and atraumatic.      Mouth/Throat:      Mouth: Mucous membranes are moist.      Pharynx: Oropharynx is clear. No oropharyngeal exudate.   Eyes:      Extraocular Movements: Extraocular movements intact.   Cardiovascular:      Rate and Rhythm: Normal rate and regular rhythm.      Pulses: Normal pulses.      Heart sounds: Normal heart sounds. No murmur heard.     No friction rub. No gallop.   Pulmonary:      Effort: Pulmonary effort is normal. No respiratory distress.      Breath sounds: Normal breath  sounds. No stridor. No wheezing or rales.   Abdominal:      General: Abdomen is flat. Bowel sounds are normal. There is no distension.      Palpations: Abdomen is soft.      Tenderness: There is no abdominal tenderness.   Musculoskeletal:      Right lower leg: No edema.      Left lower leg: No edema.   Skin:     General: Skin is warm and dry.   Neurological:      General: No focal deficit present.      Mental Status: He is alert and oriented to person, place, and time.          Discussion with Family: Attempted to update  (daughter) via phone. Left voicemail.     Discharge instructions/Information to patient and family:   See after visit summary for information provided to patient and family.      Provisions for Follow-Up Care:  See after visit summary for information related to follow-up care and any pertinent home health orders.      Mobility at time of Discharge:   Basic Mobility Inpatient Raw Score: 24  JH-HLM Goal: 8: Walk 250 feet or more  JH-HLM Achieved: 8: Walk 250 feet ot more  HLM Goal achieved. Continue to encourage appropriate mobility.     Disposition:   Home    Planned Readmission: None    Discharge Medications:  See after visit summary for reconciled discharge medications provided to patient and/or family.      Administrative Statements   Discharge Statement:  I have spent a total time of 45 minutes in caring for this patient on the day of the visit/encounter. .    **Please Note: This note may have been constructed using a voice recognition system**

## 2024-11-28 NOTE — ASSESSMENT & PLAN NOTE
Lab Results   Component Value Date    HGBA1C 5.3 11/25/2024     Recent Labs     11/27/24  1225 11/27/24  1637 11/27/24  2132 11/28/24  0717   POCGLU 109 119 141* 106     Patient reports much better A1c control after losing 140 pounds  Prior to admission on metformin.  Be held in favor of sliding-scale insulin during hospitalization

## 2024-11-28 NOTE — PLAN OF CARE

## 2024-11-28 NOTE — DISCHARGE INSTR - AVS FIRST PAGE
300 mg of caffeine each day is acceptable for someone with histroy of A-fib    You Might not need lasix for the long term. Monitor daily weights at home and write them down. Bring the weights log to your next doctor's appointment.    Cardiology  will call you next week to pick a date and time for you to come to the cardiology office to review everything and make sure you are still doing alright.    Meds:  - lasix 20mg (1 tablet) daily for now. Stop this if you become lightheaded and check your blood pressure. Keep track of your weights, and cardiology may discontinue this medication down the road.  - metoprolol 25mg (1 tablet) twice a day - morning and night  - Eliquis 5mg (1 tablet) twice a day - morning and night

## 2024-11-28 NOTE — ASSESSMENT & PLAN NOTE
WBC stable at 30 with predominantly lymphocytes, previously was normal in 2020  CXR with bibasilar opacities which may be layered effusion  No source of infection found  Outpt hematology follow up    Results from last 7 days   Lab Units 11/28/24  0509 11/27/24  0535 11/26/24  0509 11/25/24  1301   WBC Thousand/uL 30.35* 30.02* 30.95* 30.26*

## 2024-11-28 NOTE — ASSESSMENT & PLAN NOTE
Not on antihypertensive at home  DC on Lopressor & Lasix, can change to Toprol as OP if he needs more BP control

## 2024-11-28 NOTE — ASSESSMENT & PLAN NOTE
S/p successful WALTER/ CV 11/27  Continue Lopressor 25 mg q12h & Eliquis 5 mg BID  Weight loss, T2DM management & smoking cessation will be his biggest goals to avoid A-fib in the future.

## 2024-11-28 NOTE — ASSESSMENT & PLAN NOTE
Had some fluid overload in Afib RVR on admission  Treated with IV Lasix now on Lasix 20 mg QD, continue on dc  Get a standing weight today prior to dc please  Might not need lasix for the long term. Monitor daily weights at home.

## 2024-11-28 NOTE — ASSESSMENT & PLAN NOTE
History of diabetes hypertension and anxiety sent over from PCP office for new onset atrial fibrillation  Patient reports over the past week has had orthopnea palpitations and chest discomfort  Patient did lose 140 pounds resulting in better control of his diabetes mellitus but recently gained back about 40 pounds  S/p Cardizem gtt., now discontinued.  Status post cardioversion successfully  Discharged on metoprolol 25 mg twice daily and Lasix 20 mg daily

## 2024-11-29 ENCOUNTER — TRANSITIONAL CARE MANAGEMENT (OUTPATIENT)
Dept: FAMILY MEDICINE CLINIC | Facility: CLINIC | Age: 64
End: 2024-11-29

## 2024-11-29 NOTE — UTILIZATION REVIEW
NOTIFICATION OF ADMISSION DISCHARGE   This is a Notification of Discharge from [facility]. Please be advised that this patient has been discharge from our facility. Below you will find the admission and discharge date and time including the patient’s disposition.   UTILIZATION REVIEW CONTACT:  Ana Kennedy  Utilization   Network Utilization Review Department  Phone: [phone] x carefully listen to the prompts. All voicemails are confidential.  Email: [email or web address]     ADMISSION INFORMATION  PRESENTATION DATE: 11/25/2024 12:40 PM  OBERVATION ADMISSION DATE: N/A  INPATIENT ADMISSION DATE: 11/25/24  1:51 PM   DISCHARGE DATE: 11/28/2024 11:26 AM   DISPOSITION:Home/Self Care    Network Utilization Review Department  ATTENTION: Please call with any questions or concerns to [phone] and carefully listen to the prompts so that you are directed to the right person. All voicemails are confidential.   For Discharge needs, contact Care Management DC Support Team at [phone] opt. 2  Send all requests for admission clinical reviews, approved or denied determinations and any other requests to dedicated fax number below belonging to the campus where the patient is receiving treatment. List of dedicated fax numbers for the Facilities:  FACILITY NAME UR FAX NUMBER   ADMISSION DENIALS (Administrative/Medical Necessity) [phone]   DISCHARGE SUPPORT TEAM ([facility]) [phone]   PARENT CHILD HEALTH (Maternity/NICU/Pediatrics) [phone]   [facility] [phone]   [facility] [phone]   [facility] [phone]   [facility] [phone]   [facility] [phone]   [facility] [phone]   [facility] [phone]   [facility] [phone]    [facility] [phone]   [facility] [phone]   [facility] [phone]   [facility] [phone]

## 2024-12-10 ENCOUNTER — OFFICE VISIT (OUTPATIENT)
Dept: FAMILY MEDICINE CLINIC | Facility: CLINIC | Age: 64
End: 2024-12-10
Payer: COMMERCIAL

## 2024-12-10 VITALS
TEMPERATURE: 97.2 F | WEIGHT: 310.6 LBS | OXYGEN SATURATION: 98 % | HEIGHT: 72 IN | DIASTOLIC BLOOD PRESSURE: 88 MMHG | BODY MASS INDEX: 42.07 KG/M2 | SYSTOLIC BLOOD PRESSURE: 128 MMHG | HEART RATE: 90 BPM

## 2024-12-10 DIAGNOSIS — D72.829 LEUKOCYTOSIS, UNSPECIFIED TYPE: ICD-10-CM

## 2024-12-10 DIAGNOSIS — Z87.891 HISTORY OF TOBACCO USE, PRESENTING HAZARDS TO HEALTH: ICD-10-CM

## 2024-12-10 DIAGNOSIS — I10 ESSENTIAL HYPERTENSION: ICD-10-CM

## 2024-12-10 DIAGNOSIS — E80.6 HYPERBILIRUBINEMIA: ICD-10-CM

## 2024-12-10 DIAGNOSIS — I48.91 NEW ONSET ATRIAL FIBRILLATION (HCC): Primary | ICD-10-CM

## 2024-12-10 DIAGNOSIS — R93.89 ABNORMAL CHEST X-RAY: ICD-10-CM

## 2024-12-10 DIAGNOSIS — E11.9 TYPE 2 DIABETES MELLITUS WITHOUT COMPLICATION, WITHOUT LONG-TERM CURRENT USE OF INSULIN (HCC): ICD-10-CM

## 2024-12-10 PROCEDURE — 99214 OFFICE O/P EST MOD 30 MIN: CPT | Performed by: FAMILY MEDICINE

## 2024-12-10 PROCEDURE — 99495 TRANSJ CARE MGMT MOD F2F 14D: CPT | Performed by: FAMILY MEDICINE

## 2024-12-10 NOTE — ASSESSMENT & PLAN NOTE
Taking metformin once daily  Hba1c end of 11/2024 - 5.3  Well controlled.  Lipids controlled other than low HDL.  Will be seeing cardio        Lab Results   Component Value Date    HGBA1C 5.3 11/25/2024

## 2024-12-10 NOTE — ASSESSMENT & PLAN NOTE
Pt confirms that he is taking new meds:  Lopressor  Eliquis  And   Lasix    Pt underwent cardioversion    Also had echo in hospital:      Left Ventricle: Left ventricular cavity size is normal. Wall thickness is moderately increased. The left ventricular ejection fraction is 55%. Systolic function is normal. Wall motion is normal.    Left Atrium: The atrium is severely dilated.    Right Atrium: The atrium is mildly dilated.    Aortic Valve: There is aortic valve sclerosis.    Mitral Valve: There is moderate annular calcification. There is mild regurgitation.       Orders:    Ambulatory Referral to Social Work Care Management Program; Future    CT chest w contrast; Future

## 2024-12-10 NOTE — ASSESSMENT & PLAN NOTE
Pt has not had CT chest for lung cancer screening  Will order CT in f/u to abnormal cxr  And then hem/onc input  Pulmonary if needed      Orders:    CT chest w contrast; Future

## 2024-12-10 NOTE — ASSESSMENT & PLAN NOTE
Followup with hem/onc  And would recommend US and further workup with GI    Orders:    US abdomen complete; Future

## 2024-12-10 NOTE — PATIENT INSTRUCTIONS
Consider a home Blood pressure monitor  to check blood pressure and pulse - ie: OMRON    Also begin monitoring weight daily   And if weight up by 3-4 # in 2-3 days - notify pcp or cardiologist.    Keep log of BP , pulse and weight and bring to next visit    Limit salts    Keep scheduled appts with both hem/onc and cardiology    Schedule CT chest in followup to abnormal xray and history of smoking - rx given  Will need to schedule -       Schedule with GI for elevated bilirubin and schedule ultrasound of abdomen

## 2024-12-10 NOTE — PROGRESS NOTES
Transition of Care Visit  Name: Brando Martinez      : 1960      MRN: [id number]  Encounter Provider: Madiha Clement DO  Encounter Date: 12/10/2024   Encounter department: [facility]    Assessment & Plan  New onset atrial fibrillation (HCC)  Pt confirms that he is taking new meds:  Lopressor  Eliquis  And   Lasix    Pt underwent cardioversion    Also had echo in hospital:      Left Ventricle: Left ventricular cavity size is normal. Wall thickness is moderately increased. The left ventricular ejection fraction is 55%. Systolic function is normal. Wall motion is normal.    Left Atrium: The atrium is severely dilated.    Right Atrium: The atrium is mildly dilated.    Aortic Valve: There is aortic valve sclerosis.    Mitral Valve: There is moderate annular calcification. There is mild regurgitation.       Orders:    Ambulatory Referral to Social Work Care Management Program; Future    CT chest w contrast; Future    Essential hypertension         Type 2 diabetes mellitus without complication, without long-term current use of insulin (HCC)  Taking metformin once daily  Hba1c end of 2024 - 5.3  Well controlled.  Lipids controlled other than low HDL.  Will be seeing cardio        Lab Results   Component Value Date    HGBA1C 5.3 2024            Leukocytosis, unspecified type  Pt to keep appt with hem/onc         Abnormal chest x-ray  Recommend repeating    Orders:    CT chest w contrast; Future    Hyperbilirubinemia    Followup with hem/onc  And would recommend US and further workup with GI    Orders:    US abdomen complete; Future    History of tobacco use, presenting hazards to health  Pt has not had CT chest for lung cancer screening  Will order CT in f/u to abnormal cxr  And then hem/onc input  Pulmonary if needed      Orders:    CT chest w contrast; Future      Lung Cancer Screening Shared Decision Making: I discussed with him that he is a candidate for lung cancer CT  screening.     The following Shared Decision-Making points were covered:  Benefits of screening were discussed, including the rates of reduction in death from lung cancer and other causes.  Harms of screening were reviewed, including false positive tests, radiation exposure levels, risks of invasive procedures, risks of complications of screening, and risk of overdiagnosis.  I counseled on the importance of adherence to annual lung cancer LDCT screening, impact of co-morbidities, and ability or willingness to undergo diagnosis and treatment.  I counseled on the importance of maintaining abstinence as a former smoker or was counseled on the importance of smoking cessation if a current smoker    Review of Eligibility Criteria: He meets all of the criteria for Lung Cancer Screening.   - He is 64 y.o.   - He has 20 pack year tobacco history and is a current smoker or has quit within the past 15 years  - He presents no signs or symptoms of lung cancer    After discussion, the patient decided to elect lung cancer screening.    Note - pt reports to feeling overwhelmed by the amount of followup imaging, studies, visits,...  Discussed taking one day at a time and consider mindfulness  As well as trial of another med  Pt did not tolerate lexapro in past  Not interested in restarting med again at this time for anxiety/depression      Patient Instructions   Consider a home Blood pressure monitor  to check blood pressure and pulse - ie: OMRON    Also begin monitoring weight daily   And if weight up by 3-4 # in 2-3 days - notify pcp or cardiologist.    Keep log of BP , pulse and weight and bring to next visit    Limit salts    Keep scheduled appts with both hem/onc and cardiology    Schedule CT chest in followup to abnormal xray and history of smoking - rx given  Will need to schedule -       Schedule with GI for elevated bilirubin and schedule ultrasound of abdomen        Advised caution with bleeding while on  DOAC        History of Present Illness   Pt here today for TCM  Was in the office here for routine visit - and was found to be in a fib  Went to the hospital and admitted  For new onset a fib.  Put on new meds and had ablation        Transitional Care Management Review:   Brando Martinez is a 64 y.o. male here for TCM follow up.     During the TCM phone call patient stated:  TCM Call       Date and time call was made  11/29/2024 12:01 PM    Hospital care reviewed  Records reviewed    Patient was hospitialized at  [facility]    Date of Admission  11/25/24    Date of discharge  11/28/24    Diagnosis  New onset atrial fibrillation    Disposition  Home    Were the patients medications reviewed and updated  No    Current Symptoms  None          TCM Call       Post hospital issues  None    Scheduled for follow up?  No    Did you obtain your prescribed medications  Yes    Do you need help managing your prescriptions or medications  No    Is transportation to your appointment needed  No    I have advised the patient to call PCP with any new or worsening symptoms  Britney W, RMA          HPI  Review of Systems   Constitutional:  Positive for unexpected weight change. Negative for chills, diaphoresis, fatigue and fever.        Lost about 14 # since lasix.     HENT:  Negative for nosebleeds.         No bleeding gums     Respiratory:  Positive for apnea. Negative for cough, shortness of breath and wheezing.         H/o LUIS E but not using cpap  Pt did lose a lot of weight.     Cardiovascular:  Negative for chest pain, palpitations and leg swelling.        Feet and ankles no longer swollen    Pt is back in bed (and out of recliner) - and breathing normally at night.     Gastrointestinal:  Negative for abdominal pain, blood in stool, constipation, diarrhea, nausea and vomiting.   Genitourinary:         Normal color of urine and stool.     Skin:  Negative for rash.   Neurological:  Negative for dizziness, syncope and  light-headedness.        Had prior dizziness with positional changes  No longer having   Hematological:  Does not bruise/bleed easily.   Psychiatric/Behavioral:  Negative for self-injury and suicidal ideas. The patient is nervous/anxious.         No longer taking lexapro - pt did not feel that it helped.  Chronic anxiety  Declines meds at this time       Objective   /88   Pulse 92   Temp (!) 97.2 °F (36.2 °C)   Ht 6' (1.829 m)   Wt (!) 141 kg (310 lb 9.6 oz)   SpO2 98%   BMI 42.12 kg/m²     Physical Exam  Vitals and nursing note reviewed.   Constitutional:       General: He is not in acute distress.     Appearance: Normal appearance. He is not ill-appearing or toxic-appearing.   Cardiovascular:      Rate and Rhythm: Normal rate and regular rhythm.      Heart sounds: Normal heart sounds. No murmur heard.     Comments: Occasional ectopy  Pt without symptoms  Pt is comfortable seated during exam    Pulmonary:      Effort: Pulmonary effort is normal. No respiratory distress.      Breath sounds: Normal breath sounds. No wheezing, rhonchi or rales.   Abdominal:      General: There is no distension.      Palpations: Abdomen is soft. There is no mass.      Tenderness: There is no abdominal tenderness. There is no guarding or rebound.   Musculoskeletal:         General: No tenderness.      Cervical back: Neck supple.      Right lower leg: No edema.      Left lower leg: No edema.   Skin:     General: Skin is warm.      Coloration: Skin is not jaundiced.      Findings: No rash.   Neurological:      General: No focal deficit present.      Mental Status: He is alert and oriented to person, place, and time.   Psychiatric:         Mood and Affect: Mood normal.         Behavior: Behavior normal.         Thought Content: Thought content normal.         Judgment: Judgment normal.       Medications have been reviewed by provider in current encounter

## 2024-12-10 NOTE — PROGRESS NOTES
Cardiology Follow Up    [facility] [address] [address]  PHONE: [phone]  FAX: [phone]    Brando Martinez  1960  [id number]    Assessment/Plan:  Paroxsymal atrial fibrillation, 11/25/24  S/p WALTER/ CV 11/27/24, now back in A-Fib (rate controlled). HR at home averaging 73 bpm.  Continue Lopressor 25 mg q12h & Eliquis 5 mg BID  After further reflection since his hospital discharge, the patient thinks he has been feeling the symptoms of A-Fib for many years  Can consider flecainide in the future. This would require stress test. Patient has heard bad reviews about pharmacologic stress test. It would have to be exercise stress test.  We can revisit this at his next office visit.    Essential hypertension  At goal on Lopressor & Lasix. Home BP averaging 138/84 mmHg.  If in the future he needs more BP control, can change to Toprol    Current every day smoker  He is working on quitting smoking and he is motivated!    H/o fluid overload  Dry weight ~ 310#. He is near euvolemic today (home eight averaging 314#).   Continue daily Lasix.  Continue daily weights.   Low salt diet  If there is fluid retention, weight gain 3 pounds overnight or 5 pounds in 1 week, please take an extra Lasix pill and call the cardiology office.    RTO in 1 month with me or Dr. Bridger Cunningham.    Interval History:   Brando Martinez is a 64 y.o. male with past medical history as below who presents to the office for follow-up after hospitalization at [facility] from 11/25 - 11/28/2024 for JACOBO.  Found to have new onset A-fib with RVR.  Treated with rate control medications and eventually WALTER & CV.  Patient reports that since his discharge from home, he has been compliant with his medications.  He cannot pinpoint exactly when he went back into A-fib.  Patient reports with physical activity he will feel mild JACOBO and fluttering in the chest which resolves after only a moment of  rest.  He has not been going for his walks or using the bicycle because he is afraid of his heart rhythm.  I encouraged him he is allowed to go for walks.  Patient denies orthopnea, coughing or shortness of breath.    Patient has been very overwhelmed with his heart problem diagnoses and he has labile emotions, finds himself very tearful and crying.  He has a history of anxiety and depression with strong family history of the same. I provided a listening ear.    Review of Systems   Cardiovascular:  Positive for dyspnea on exertion and palpitations. Negative for chest pain and near-syncope.   Respiratory:  Negative for cough, shortness of breath and sleep disturbances due to breathing.    Gastrointestinal:  Negative for hematochezia and melena.   Neurological:  Negative for dizziness and light-headedness.     Past Medical History:   Diagnosis Date    Anxiety     last asessed 06Nov2012    Depression     last assessed 29Jan2013    Hemangioma of liver     Of liver - emnolized 2012;  last assessed 19Aug2014    History of basal cell cancer     basal cell skin ca on nose    Hyperlipidemia     Polyp of sigmoid colon 09/01/2012    x 1  9/12      Past Surgical History:   Procedure Laterality Date    CHOLECYSTECTOMY LAPAROSCOPIC      EMBOLIZATION  02/01/2012    Large Hemangioma, 2/12    VENTRAL HERNIA REPAIR      Ventral 00      Family History   Problem Relation Age of Onset    Depression Mother     Hodgkin's lymphoma Maternal Uncle       Current Outpatient Medications:     apixaban (ELIQUIS) 5 mg, Take 1 tablet (5 mg total) by mouth 2 (two) times a day THIS IS A PRICE CHECK, Disp: 60 tablet, Rfl: 0    furosemide (LASIX) 20 mg tablet, Take 1 tablet (20 mg total) by mouth daily Do not start before November 28, 2024., Disp: 30 tablet, Rfl: 0    metFORMIN (GLUCOPHAGE) 1000 MG tablet, TAKE 1 TABLET BY MOUTH EVERY DAY WITH BREAKFAST, Disp: 90 tablet, Rfl: 3    metoprolol tartrate (LOPRESSOR) 25 mg tablet, Take 1 tablet (25 mg  total) by mouth every 12 (twelve) hours, Disp: 60 tablet, Rfl: 0    Multiple Vitamin (MULTI VITAMIN MENS PO), Take by mouth in the morning, Disp: , Rfl:     LORazepam (ATIVAN) 0.5 mg tablet, Take 1 tablet (0.5 mg total) by mouth every 8 (eight) hours as needed for anxiety (Patient not taking: Reported on 12/10/2024), Disp: 15 tablet, Rfl: 0     No Known Allergies    Physical Exam:  Vitals:    12/16/24 0849   BP: 132/92   Pulse: (!) 50     Vitals:    12/16/24 0849   Weight: (!) 143 kg (315 lb 6.4 oz)     Height: 6' (182.9 cm)   Body mass index is 42.78 kg/m².    Physical Exam  Vitals and nursing note reviewed.   Constitutional:       General: He is not in acute distress.     Appearance: He is not ill-appearing, toxic-appearing or diaphoretic.   HENT:      Head: Normocephalic and atraumatic.      Nose: No congestion.      Mouth/Throat:      Mouth: Mucous membranes are moist.   Eyes:      Conjunctiva/sclera: Conjunctivae normal.   Neck:      Vascular: No carotid bruit.      Comments: - JVD  Cardiovascular:      Rate and Rhythm: Bradycardia present. Rhythm irregularly irregular.      Heart sounds: S1 normal and S2 normal. No murmur heard.  Pulmonary:      Effort: Pulmonary effort is normal.      Breath sounds: No wheezing, rhonchi or rales.      Comments: No adventitious breath sounds no dyspnea noted no coughing  Abdominal:      General: There is no distension.      Palpations: Abdomen is soft.   Musculoskeletal:      Comments: 1 + pitting edema bl ankles   Skin:     General: Skin is warm and dry.   Neurological:      Mental Status: He is alert and oriented to person, place, and time.   Psychiatric:         Behavior: Behavior normal.     Data:  ECHO: 11/25/2024 moderate LVH, LVEF 55%.  Severely dilated left atrium.  Mildly dilated right atrium.  AV sclerosis.  Moderate MAC with mild MR.    ECG: Atrial fibrillation with slow ventricular response, heart rate 50 bpm.  Normal axis no infarct or ischemia.    Christy Moe  PAJULIANA  [facility]

## 2024-12-11 ENCOUNTER — PATIENT OUTREACH (OUTPATIENT)
Dept: CASE MANAGEMENT | Facility: OTHER | Age: 64
End: 2024-12-11

## 2024-12-11 NOTE — PROGRESS NOTES
SERA SCOTT reviewed chart. Pt was referred by provider d/t not being able to afford Eliquis. Pt has MA. Insurance should cover Eliquis.     SERA SCOTT placed call to pt to introduce self and discuss his needs. Pt stated that he cannot afford Eliquis as it is expected to cost about $700. SERA SCOTT explained that his MA should cover the Rx. Pt stated he only paid $3 for it initially. SERA SCOTT encouraged pt to call his pharmacy Encouraged pt to call back if it is not covered.     SERA SCOTT shared that he should consider looking into Medicare coverage as he will be required to enroll soon. Pt has someone helping him with looking into Medicare coverage as he will have to sign up within the next few months. Pt stated he is aware he may qualify to keep MA as well.    Pt shared that he is anxious and depressed at times. Pt shared that he tried medication in the past but it made his mouth dry.  SERA SCOTT asked pt if he would like to be connected with OP MH. Pt is open to this, but would like to have time to consider this. SERA SCOTT will check in with pt next week to f/u regarding OP MH. Pt denied SI.    Pt wrote down SERA SCOTT's phone number and will call if he finds anything out from the pharmacy that he should update SERA SCOTT on. SERA SCOTT will outreach pt next week.

## 2024-12-16 ENCOUNTER — OFFICE VISIT (OUTPATIENT)
Dept: CARDIOLOGY CLINIC | Facility: CLINIC | Age: 64
End: 2024-12-16
Payer: COMMERCIAL

## 2024-12-16 VITALS
DIASTOLIC BLOOD PRESSURE: 92 MMHG | SYSTOLIC BLOOD PRESSURE: 132 MMHG | HEIGHT: 72 IN | HEART RATE: 50 BPM | BODY MASS INDEX: 42.66 KG/M2 | WEIGHT: 315 LBS

## 2024-12-16 DIAGNOSIS — I48.91 ATRIAL FIBRILLATION (HCC): ICD-10-CM

## 2024-12-16 DIAGNOSIS — I48.91 NEW ONSET ATRIAL FIBRILLATION (HCC): Primary | ICD-10-CM

## 2024-12-16 DIAGNOSIS — F17.200 CURRENT EVERY DAY SMOKER: ICD-10-CM

## 2024-12-16 DIAGNOSIS — E87.70 FLUID OVERLOAD: ICD-10-CM

## 2024-12-16 DIAGNOSIS — I10 ESSENTIAL HYPERTENSION: ICD-10-CM

## 2024-12-16 DIAGNOSIS — E66.01 MORBID OBESITY WITH BMI OF 40.0-44.9, ADULT (HCC): ICD-10-CM

## 2024-12-16 PROCEDURE — 93000 ELECTROCARDIOGRAM COMPLETE: CPT | Performed by: PHYSICIAN ASSISTANT

## 2024-12-16 PROCEDURE — 99214 OFFICE O/P EST MOD 30 MIN: CPT | Performed by: PHYSICIAN ASSISTANT

## 2024-12-16 RX ORDER — FUROSEMIDE 20 MG/1
20 TABLET ORAL DAILY
Qty: 30 TABLET | Refills: 11 | Status: SHIPPED | OUTPATIENT
Start: 2024-12-16

## 2024-12-16 RX ORDER — METOPROLOL TARTRATE 25 MG/1
25 TABLET, FILM COATED ORAL EVERY 12 HOURS SCHEDULED
Qty: 60 TABLET | Refills: 11 | Status: SHIPPED | OUTPATIENT
Start: 2024-12-16

## 2024-12-16 NOTE — PATIENT INSTRUCTIONS
If you gain 3 lbs in 1 day or 5lbs in 1 week , take an extra lasix pill.    If you think you are retaining fluid please take an extra lasix pill and call me.

## 2024-12-18 ENCOUNTER — PATIENT OUTREACH (OUTPATIENT)
Dept: CASE MANAGEMENT | Facility: OTHER | Age: 64
End: 2024-12-18

## 2024-12-18 NOTE — PROGRESS NOTES
SERA SCOTT placed call to pt to f/u. Patient did not answer. SERA SCOTT left message asking patient to return call. Will try to reach pt next week if he does not return call sooner.

## 2024-12-26 ENCOUNTER — PATIENT OUTREACH (OUTPATIENT)
Dept: CASE MANAGEMENT | Facility: OTHER | Age: 64
End: 2024-12-26

## 2024-12-26 NOTE — PROGRESS NOTES
SERA SCOTT received vm from pt while OOO. SERA SCOTT returned call and asked pt to return call. Provided availability as SERA SCOTT will be OOO 12/30-1/1 next week, returning on 1/2.

## 2025-01-02 ENCOUNTER — PATIENT OUTREACH (OUTPATIENT)
Dept: CASE MANAGEMENT | Facility: OTHER | Age: 65
End: 2025-01-02

## 2025-01-02 NOTE — PROGRESS NOTES
"SERA SCOTT placed call to pt to f/u. Patient did not answer. SERA SCOTT left message asking patient to return call. Will try to reach pt again within two weeks.    ADDENDUM:   SERA SCOTT received call from pt and he was able to get his Eliquis. Pt sated that his bills were taken care of from the hospital.    Pt stated that he does not understand why some things (tests, procedures) were done. SERA SCOTT listened to pt. S Pt stated there are times he had procedures done, but there are no records of them being done.    Pt is looking forward to seeing his PCP. Pt stated that he will stop seeing his PCP and getting treatment if he feels that his PCP is not honest with him. Pt is mistrusting of the network/health care providers because he does not know what is going on and thinks he is only being told to do things so he can be billed. Pt would likley benefit from education on his diagnosis and reasons for testing, medications, etc if possible. JUDD SCOTT offered to connect pt with an RN CM to discuss tests and perhaps go over reasons things were ordered. Pt declined and stated no one will have an honest conversation with him.    Pt shared that he feels very anxious about these things. SERA SCOTT asked pt if he has considered seeing a therapist since our last conversation. Pt stated he does not think it would be very helpful because he thinks about [place] a lot and it puts him in a dark place. SERA SCOTT and pt talked about the barriers to him going to therapy and mistrust being one of the main concerns - that the therapist won't care. SERA SCOTT offered perspective that pt may not like his first therapist, but if he is open to working on feeling better he can try to work with someone else. Explained that it is best if he has a \"good fit\". Pt stated he will still think about it, but he is not interested at this time.     Note routed to PCP.  "

## 2025-01-03 DIAGNOSIS — E11.9 TYPE 2 DIABETES MELLITUS WITHOUT COMPLICATION, WITHOUT LONG-TERM CURRENT USE OF INSULIN (HCC): ICD-10-CM

## 2025-01-06 NOTE — PROGRESS NOTES
"   Cardiology Follow Up    [facility] [address] [address]  PHONE: [phone]  FAX: [phone]    Brando Martinez  1960  [id number]    Assessment/Plan:  Paroxsymal atrial fibrillation, 11/25/24  S/p WALTER/ CV 11/27/24, now back in A-Fib (rate controlled).   Continue Lopressor 25 mg q12h & Eliquis 5 mg BID  After further reflection since his hospital discharge, the patient thinks he has been feeling the symptoms of A-Fib for many years  I think the only way he could possibly maintain NSR is with ablation and antiarrhythmic. Not open to more procedures at this point. We will hold off on EP referral.     Essential hypertension  If in the future he needs more BP control, can change to Toprol     Current every day smoker  He is working on quitting smoking and he is motivated!     H/o fluid overload  Dry weight ~ 310#. He is hypervolemic today.  Increase Lasix to 40 mg QD. Might have to go even higher.  Continue daily weights.   Encouraged low salt diet    RTO with me or Dr. Cunningham in 1 month.    Interval History:   Brando Martinez is a 64 y.o. male with past medical history as below who presents to the office for routine cardiovascular follow-up.  Patient reports his weights are slowly going up. This holiday season he had much dietary indiscretion.  He took the liberty of an extra Lasix \" as needed for weight gain\" and therefore ran out of his prescription early so he had days where he did not take the Lasix.  I adjusted his Lasix prescription today so this hopefully will not happen in the future.  Patient reports peripheral edema.  Patient reports dyspnea with exertion when he tries to go for a walk he gets short of breath even with walking 100 feet.  Patient reports with activity he notices racing heartbeat in his shirt with tightness in the middle of his chest.  Resolves almost immediately with rest.  No palpitations at rest.  No lightheadedness or " Patient prepped for procedure.  POC reviewed with pt and his wife who verbalized understanding.    Outpatient pharmacy confirmed.  Sindhu mcneil refused. Patient has crutches which will remain in PP1 during procedure    Spouse to keep all valuables. All clothing to remain in locker during procedure.    Side rails up x2, call light in reach.    presyncope.    Past Medical History:   Diagnosis Date    Anxiety     last asessed 06Nov2012    Depression     last assessed 29Jan2013    Hemangioma of liver     Of liver - emnolized 2012;  last assessed 19Aug2014    History of basal cell cancer     basal cell skin ca on nose    Hyperlipidemia     Polyp of sigmoid colon 09/01/2012    x 1  9/12      Past Surgical History:   Procedure Laterality Date    CHOLECYSTECTOMY LAPAROSCOPIC      EMBOLIZATION  02/01/2012    Large Hemangioma, 2/12    VENTRAL HERNIA REPAIR      Ventral 00      Family History   Problem Relation Age of Onset    Depression Mother     Hodgkin's lymphoma Maternal Uncle       Current Outpatient Medications:     apixaban (ELIQUIS) 5 mg, Take 1 tablet (5 mg total) by mouth 2 (two) times a day, Disp: 60 tablet, Rfl: 11    furosemide (LASIX) 20 mg tablet, Take 2 tablets (40 mg total) by mouth daily, Disp: 180 tablet, Rfl: 3    LORazepam (ATIVAN) 0.5 mg tablet, Take 1 tablet (0.5 mg total) by mouth every 8 (eight) hours as needed for anxiety, Disp: 15 tablet, Rfl: 0    metFORMIN (GLUCOPHAGE) 1000 MG tablet, TAKE 1 TABLET BY MOUTH EVERY DAY WITH BREAKFAST, Disp: 90 tablet, Rfl: 1    metoprolol tartrate (LOPRESSOR) 25 mg tablet, Take 1 tablet (25 mg total) by mouth every 12 (twelve) hours, Disp: 60 tablet, Rfl: 11    Multiple Vitamin (MULTI VITAMIN MENS PO), Take by mouth in the morning, Disp: , Rfl:      No Known Allergies    Physical Exam:  Vitals:    01/13/25 1051   BP: 134/80   Pulse: 60     Vitals:    01/13/25 1051   Weight: (!) 147 kg (324 lb)     Height: 6' (182.9 cm)   Body mass index is 43.94 kg/m².    Physical Exam  Vitals and nursing note reviewed.   Constitutional:       General: He is not in acute distress.     Appearance: He is not ill-appearing.   HENT:      Head: Normocephalic and atraumatic.      Nose: No congestion.      Mouth/Throat:      Mouth: Mucous membranes are moist.   Eyes:      Conjunctiva/sclera: Conjunctivae normal.   Neck:       Comments: Thick neck cannot observe any JVD  Cardiovascular:      Rate and Rhythm: Normal rate. Rhythm irregularly irregular.      Heart sounds: S1 normal and S2 normal. Heart sounds are distant. No murmur heard.  Pulmonary:      Effort: Pulmonary effort is normal.      Breath sounds: Rales present. No wheezing or rhonchi.   Abdominal:      Comments: Central obesity  Firm abdomen   Musculoskeletal:      Comments: 2+ pitting edema bl le to the knees   Skin:     General: Skin is warm and dry.   Neurological:      Mental Status: He is alert and oriented to person, place, and time.   Psychiatric:         Behavior: Behavior normal.     Data:  ECHO: 11/25/2024 moderate LVH, LVEF 55%.  Severely dilated left atrium.  Mildly dilated right atrium.  AV sclerosis.  Moderate MAC with mild MR.     Christy Rosa PA-C  [facility]

## 2025-01-13 ENCOUNTER — OFFICE VISIT (OUTPATIENT)
Dept: CARDIOLOGY CLINIC | Facility: CLINIC | Age: 65
End: 2025-01-13
Payer: COMMERCIAL

## 2025-01-13 VITALS
HEIGHT: 72 IN | BODY MASS INDEX: 42.66 KG/M2 | DIASTOLIC BLOOD PRESSURE: 80 MMHG | HEART RATE: 60 BPM | SYSTOLIC BLOOD PRESSURE: 134 MMHG | WEIGHT: 315 LBS

## 2025-01-13 DIAGNOSIS — I10 ESSENTIAL HYPERTENSION: ICD-10-CM

## 2025-01-13 DIAGNOSIS — I48.91 NEW ONSET ATRIAL FIBRILLATION (HCC): Primary | ICD-10-CM

## 2025-01-13 DIAGNOSIS — I48.91 ATRIAL FIBRILLATION (HCC): ICD-10-CM

## 2025-01-13 PROCEDURE — 99214 OFFICE O/P EST MOD 30 MIN: CPT | Performed by: PHYSICIAN ASSISTANT

## 2025-01-13 RX ORDER — FUROSEMIDE 20 MG/1
40 TABLET ORAL DAILY
Qty: 180 TABLET | Refills: 3 | Status: SHIPPED | OUTPATIENT
Start: 2025-01-13

## 2025-01-22 ENCOUNTER — OFFICE VISIT (OUTPATIENT)
Dept: HEMATOLOGY ONCOLOGY | Facility: CLINIC | Age: 65
End: 2025-01-22
Payer: COMMERCIAL

## 2025-01-22 VITALS
RESPIRATION RATE: 18 BRPM | BODY MASS INDEX: 42.66 KG/M2 | OXYGEN SATURATION: 93 % | DIASTOLIC BLOOD PRESSURE: 82 MMHG | HEART RATE: 83 BPM | WEIGHT: 315 LBS | TEMPERATURE: 97.2 F | HEIGHT: 72 IN | SYSTOLIC BLOOD PRESSURE: 142 MMHG

## 2025-01-22 DIAGNOSIS — F17.200 CURRENT EVERY DAY SMOKER: ICD-10-CM

## 2025-01-22 DIAGNOSIS — D72.820 LYMPHOCYTOSIS: Primary | ICD-10-CM

## 2025-01-22 PROCEDURE — G2211 COMPLEX E/M VISIT ADD ON: HCPCS | Performed by: PHYSICIAN ASSISTANT

## 2025-01-22 PROCEDURE — 99204 OFFICE O/P NEW MOD 45 MIN: CPT | Performed by: PHYSICIAN ASSISTANT

## 2025-01-22 NOTE — ASSESSMENT & PLAN NOTE
Advised cessation and CT imaging to r/o occult malignancy.   Orders:    CT chest abdomen pelvis w contrast; Future

## 2025-01-22 NOTE — ASSESSMENT & PLAN NOTE
This is a 64-year-old male with past medical history of lymphocytosis who presents to the hematology clinic for evaluation of elevated total white blood cell count.  Patient does have a history of differential testing that has demonstrated smudge cells in the past.  Patient's total white blood cell count is around 30,000/unit liter with most recent ALC value being elevated during most recent hospitalization.    There is high clinical suspicion for CLL considering the patient's differential and persistently elevated total white blood cell count.  I had an extensive discussion with the patient today regarding this diagnosis.  Patient understands that subsequent testing as outlined below was recommended initially.  There are people who are stage 0 CLL-lymphocytosis only, you can be observed for many years before needing treatment.   I have recommended that the patient undergo CT scan of the chest abdomen pelvis for assessment of lymphadenopathy as well as spleen/hepatosplenomegaly.  With the patient's long history of tobacco abuse, CT chest imaging is also recommended.    Diagnostic testing via flow cytometry was recommended with additional blood tests below.  Hepatitis panel will be completed.  Questionable history of hepatitis C in patient's past.  Most recent testing was negative.    Patient will follow-up in approximately 4 weeks after imaging is completed and blood work finalized.  Patient's appointment will be with physician who will outline future of treatment and follow-up scheduling.  At this time, patient is asymptomatic.     Patient will need to have other routine health maintenance examinations including colonoscopy and PSA screenings.  However this is on hold at this time until clarification regarding hematologic diagnosis is made.    Orders:    Ambulatory Referral to Hematology / Oncology    CT chest abdomen pelvis w contrast; Future    Leukemia/Lymphoma flow cytometry; Future    CBC and differential;  Future    Comprehensive metabolic panel; Future    Chronic Hepatitis Panel; Future

## 2025-01-22 NOTE — PATIENT INSTRUCTIONS
[facility] Medical Oncology and Hematology Team  [facility] [place] - [phone]    Your Team Member:  Advanced Practitioner:  Dara Edward PA-C    Please answer Private and Unavailable Calls - this may be your team(s) contacting you.  If you have medical questions/concerns/issues - contact us either by (1) My Chart (2) [facility]

## 2025-01-22 NOTE — PROGRESS NOTES
Name: Brnado Martinez      : 1960      MRN: [id number]  Encounter Provider: Dara Edward PA-C  Encounter Date: 2025   Encounter department: [facility]  :  Assessment & Plan  Lymphocytosis  This is a 64-year-old male with past medical history of lymphocytosis who presents to the hematology clinic for evaluation of elevated total white blood cell count.  Patient does have a history of differential testing that has demonstrated smudge cells in the past.  Patient's total white blood cell count is around 30,000/unit liter with most recent ALC value being elevated during most recent hospitalization.    There is high clinical suspicion for CLL considering the patient's differential and persistently elevated total white blood cell count.  I had an extensive discussion with the patient today regarding this diagnosis.  Patient understands that subsequent testing as outlined below was recommended initially.  There are people who are stage 0 CLL-lymphocytosis only, you can be observed for many years before needing treatment.   I have recommended that the patient undergo CT scan of the chest abdomen pelvis for assessment of lymphadenopathy as well as spleen/hepatosplenomegaly.  With the patient's long history of tobacco abuse, CT chest imaging is also recommended.    Diagnostic testing via flow cytometry was recommended with additional blood tests below.  Hepatitis panel will be completed.  Questionable history of hepatitis C in patient's past.  Most recent testing was negative.    Patient will follow-up in approximately 4 weeks after imaging is completed and blood work finalized.  Patient's appointment will be with physician who will outline future of treatment and follow-up scheduling.  At this time, patient is asymptomatic.     Patient will need to have other routine health maintenance examinations including colonoscopy and PSA screenings.  However this is on hold at  this time until clarification regarding hematologic diagnosis is made.    Orders:    Ambulatory Referral to Hematology / Oncology    CT chest abdomen pelvis w contrast; Future    Leukemia/Lymphoma flow cytometry; Future    CBC and differential; Future    Comprehensive metabolic panel; Future    Chronic Hepatitis Panel; Future    Current every day smoker  Advised cessation and CT imaging to r/o occult malignancy.   Orders:    CT chest abdomen pelvis w contrast; Future    Return in about 4 weeks (around 2/19/2025) for Office Visit with Saturnino Wilburn.     History of Present Illness   Chief Complaint   Patient presents with    Consult      Pertinent Medical History   This is a 64-year-old with past medical history of type 2 diabetes history of tobacco use, depression, history of basal cell carcinoma, obesity, atrial fibrillation and hypertension who presents to the hematology clinic for evaluation of leukocytosis present since January 2020.    Hematologic review:  7/21/2017 WBC 8.7, hemoglobin 15.5, MCV 90, platelets 199  1/20/2020 WBC 15.3, hemoglobin 15.3, MCV 93, platelet count 228  6/3/2022 WBC 24.4, hemoglobin 13.3, MCV 94, platelet count 163   Differential normal with the exception of ALC of 19.9   Hematologic review demonstrated moderate amount of smudge cells.    November 2024: Patient admitted with atrial fibrillation concerns.  Admission CBCD WBC 30.2, hemoglobin 14.1, MCV 94, platelet 193,   ANC elevated 8.7, ALC 20.2  Discharge blood work WBC 30.3, hemoglobin 12.7, platelets 175-no differential    01/24/25: Patient notes that during his admission to the hospital was brought to his attention that the white blood cell count has been consistently elevated.  Hospitalist recommended follow-up with hematology for further workup regarding this issue.  Patient was somewhat upset by this.  Patient adult daughter who does not live close, recently her mother passed away patient is concerned being available to her.     Patient has no B symptoms, low medical literacy.  Patient is not presently working.    Last Colonoscopy: 10/04/2012  Last PSA:   Lab Results   Component Value Date    PSA 0.5 08/22/2014        Review of Systems   Constitutional:  Negative for activity change, appetite change, fatigue and fever.   HENT:  Negative for nosebleeds.    Respiratory:  Negative for cough, choking and shortness of breath.    Cardiovascular:  Negative for chest pain, palpitations and leg swelling.   Gastrointestinal:  Negative for abdominal distention, abdominal pain, anal bleeding, blood in stool, constipation, diarrhea, nausea and vomiting.   Endocrine: Negative for cold intolerance.   Genitourinary:  Negative for hematuria.   Musculoskeletal:  Negative for myalgias.   Skin:  Negative for color change, pallor and rash.   Allergic/Immunologic: Negative for immunocompromised state.   Neurological:  Negative for headaches.   Hematological:  Negative for adenopathy. Does not bruise/bleed easily.   All other systems reviewed and are negative.    Medical History Reviewed by provider this encounter:  Tobacco  Allergies  Meds  Problems  Med Hx  Surg Hx  Fam Hx           Objective   /82 (BP Location: Left arm, Patient Position: Sitting, Cuff Size: Large)   Pulse 83   Temp (!) 97.2 °F (36.2 °C) (Temporal)   Resp 18   Ht 6' (1.829 m)   Wt (!) 148 kg (326 lb)   SpO2 93%   BMI 44.21 kg/m²     Pain Screening:  Pain Score: 0-No pain  ECOG ECOG Performance Status: 0 - Fully active, able to carry on all pre-disease performance without restriction   Physical Exam  Constitutional:       General: He is not in acute distress.     Appearance: He is well-developed.   HENT:      Head: Normocephalic and atraumatic.      Mouth/Throat:      Pharynx: No oropharyngeal exudate.      Comments: Poor dental health  Eyes:      General: No scleral icterus.     Conjunctiva/sclera: Conjunctivae normal.   Cardiovascular:      Rate and Rhythm: Normal rate  and regular rhythm.      Heart sounds: No murmur heard.  Pulmonary:      Effort: Pulmonary effort is normal. No respiratory distress.      Breath sounds: Normal breath sounds.   Abdominal:      General: Bowel sounds are normal. There is no distension.      Palpations: Abdomen is soft. There is no mass.      Tenderness: There is no abdominal tenderness.      Comments: No hepatosplenomegaly palpated however the patient's body habitus did make the examination difficult.   Musculoskeletal:         General: No tenderness.      Cervical back: Normal range of motion.      Right lower leg: No edema.      Left lower leg: No edema.   Lymphadenopathy:      Cervical: No cervical adenopathy.   Skin:     Coloration: Skin is not pale.      Findings: No erythema or rash.   Neurological:      Mental Status: He is alert and oriented to person, place, and time.       Labs: I have reviewed the following labs:  Lab Results   Component Value Date/Time    WBC 30.35 (H) 11/28/2024 05:09 AM    RBC 4.20 11/28/2024 05:09 AM    Hemoglobin 12.7 11/28/2024 05:09 AM    Hematocrit 40.7 11/28/2024 05:09 AM    MCV 97 11/28/2024 05:09 AM    MCH 30.2 11/28/2024 05:09 AM    RDW 15.1 11/28/2024 05:09 AM    Platelets 175 11/28/2024 05:09 AM    Lymphocytes % 67 (H) 11/25/2024 01:01 PM    Monocytes % 3 (L) 11/25/2024 01:01 PM    Eosinophils % 1 11/25/2024 01:01 PM    Basophils % 0 11/25/2024 01:01 PM     Lab Results   Component Value Date/Time    Potassium 4.1 11/27/2024 05:35 AM    Chloride 103 11/27/2024 05:35 AM    CO2 30 11/27/2024 05:35 AM    BUN 15 11/27/2024 05:35 AM    Creatinine 0.78 11/27/2024 05:35 AM    Calcium 9.8 11/27/2024 05:35 AM    AST 14 11/26/2024 05:09 AM    ALT 22 11/26/2024 05:09 AM    Alkaline Phosphatase 58 11/26/2024 05:09 AM    Total Protein 6.9 11/26/2024 05:09 AM    Albumin 4.2 11/26/2024 05:09 AM    Total Bilirubin 2.27 (H) 11/26/2024 05:09 AM    eGFR 95 11/27/2024 05:35 AM     Administrative Statements   I have spent a total  time of 52 minutes in caring for this patient on the day of the visit/encounter including Instructions for management, Patient and family education, Documenting in the medical record, Reviewing / ordering tests, medicine, procedures  , and Obtaining or reviewing history  .

## 2025-01-28 LAB
ALBUMIN SERPL-MCNC: 4.5 G/DL (ref 3.5–5.7)
ALP SERPL-CCNC: 54 U/L (ref 35–120)
ALT SERPL-CCNC: 14 U/L
ANION GAP SERPL CALCULATED.3IONS-SCNC: 6 MMOL/L (ref 3–11)
AST SERPL-CCNC: 14 U/L
BILIRUB SERPL-MCNC: 1.3 MG/DL (ref 0.2–1)
BUN SERPL-MCNC: 25 MG/DL (ref 7–28)
CALCIUM SERPL-MCNC: 9.9 MG/DL (ref 8.5–10.5)
CHLORIDE SERPL-SCNC: 102 MMOL/L (ref 100–109)
CO2 SERPL-SCNC: 33 MMOL/L (ref 21–31)
CREAT SERPL-MCNC: 0.89 MG/DL (ref 0.53–1.3)
CYTOLOGY CMNT CVX/VAG CYTO-IMP: ABNORMAL
GFR/BSA.PRED SERPLBLD CYS-BASED-ARV: 95 ML/MIN/{1.73_M2}
GLUCOSE SERPL-MCNC: 151 MG/DL (ref 65–99)
HAV AB SER-IMP: ABNORMAL
HAV IGG+IGM SER QL: NONREACTIVE
HAV IGM SERPL QL IA: NONREACTIVE
HBV CORE AB SERPL QL IA: NONREACTIVE
HBV CORE IGM SERPL QL IA: NONREACTIVE
HBV SURFACE AB SERPL IA-ACNC: <3 MIU/ML
HBV SURFACE AG SERPL QL IA: NONREACTIVE
HCV AB SERPL QL IA: NONREACTIVE
IMMUNOPHENOTYPING STUDY: NORMAL
POTASSIUM SERPL-SCNC: 4.5 MMOL/L (ref 3.5–5.2)
PROT SERPL-MCNC: 6.7 G/DL (ref 6.3–8.3)
REF LAB TEST REF RANGE: ABNORMAL
SODIUM SERPL-SCNC: 141 MMOL/L (ref 135–145)

## 2025-01-29 LAB
BASOPHILS # BLD AUTO: 0 THOU/CMM (ref 0–0.1)
BASOPHILS NFR BLD AUTO: 0 %
DIFFERENTIAL METHOD BLD: ABNORMAL
EOSINOPHIL # BLD AUTO: 0 THOU/CMM (ref 0–0.5)
EOSINOPHIL NFR BLD AUTO: 0 %
ERYTHROCYTE [DISTWIDTH] IN BLOOD BY AUTOMATED COUNT: 17.3 % (ref 12–16)
HCT VFR BLD AUTO: 43.9 % (ref 37–48)
HGB BLD-MCNC: 14.1 G/DL (ref 12.5–17)
LYMPHOCYTES # BLD AUTO: 31.7 THOU/CMM (ref 1–3)
LYMPHOCYTES NFR BLD AUTO: 89 %
MCH RBC QN AUTO: 29.5 PG (ref 27–36)
MCHC RBC AUTO-ENTMCNC: 32.1 G/DL (ref 32–37)
MCV RBC AUTO: 92 FL (ref 80–100)
MONOCYTES # BLD AUTO: 1.1 THOU/CMM (ref 0.3–1)
MONOCYTES NFR BLD AUTO: 3 %
MORPHOLOGY BLD-IMP: ABNORMAL
NEUTROPHILS # BLD AUTO: 2.9 THOU/CMM (ref 1.8–7.8)
NEUTROPHILS NFR BLD AUTO: 8 %
PLATELET # BLD AUTO: 173 THOU/CMM (ref 140–350)
PMV BLD REES-ECKER: 10 FL (ref 7.5–11.3)
RBC # BLD AUTO: 4.77 MILL/CMM (ref 4–5.4)
SL AMB STOMATOCYTES: ABNORMAL
SMUDGE CELLS BLD QL SMEAR: SLIGHT
WBC # BLD AUTO: 35.7 THOU/CMM (ref 4–10.5)

## 2025-02-04 ENCOUNTER — RESULTS FOLLOW-UP (OUTPATIENT)
Dept: HEMATOLOGY ONCOLOGY | Facility: CLINIC | Age: 65
End: 2025-02-04

## 2025-02-04 ENCOUNTER — OFFICE VISIT (OUTPATIENT)
Dept: FAMILY MEDICINE CLINIC | Facility: CLINIC | Age: 65
End: 2025-02-04
Payer: COMMERCIAL

## 2025-02-04 VITALS
BODY MASS INDEX: 43.45 KG/M2 | OXYGEN SATURATION: 96 % | HEART RATE: 72 BPM | SYSTOLIC BLOOD PRESSURE: 134 MMHG | DIASTOLIC BLOOD PRESSURE: 82 MMHG | WEIGHT: 315 LBS

## 2025-02-04 DIAGNOSIS — D72.829 LEUKOCYTOSIS, UNSPECIFIED TYPE: ICD-10-CM

## 2025-02-04 DIAGNOSIS — Z85.828 HISTORY OF BASAL CELL CARCINOMA (BCC) EXCISION: ICD-10-CM

## 2025-02-04 DIAGNOSIS — F33.1 MODERATE EPISODE OF RECURRENT MAJOR DEPRESSIVE DISORDER (HCC): ICD-10-CM

## 2025-02-04 DIAGNOSIS — E11.9 TYPE 2 DIABETES MELLITUS WITHOUT COMPLICATION, WITHOUT LONG-TERM CURRENT USE OF INSULIN (HCC): ICD-10-CM

## 2025-02-04 DIAGNOSIS — E66.01 MORBID OBESITY WITH BMI OF 40.0-44.9, ADULT (HCC): ICD-10-CM

## 2025-02-04 DIAGNOSIS — Z98.890 HISTORY OF BASAL CELL CARCINOMA (BCC) EXCISION: ICD-10-CM

## 2025-02-04 DIAGNOSIS — I48.0 PAROXYSMAL ATRIAL FIBRILLATION (HCC): ICD-10-CM

## 2025-02-04 DIAGNOSIS — I10 ESSENTIAL HYPERTENSION: Primary | ICD-10-CM

## 2025-02-04 DIAGNOSIS — I48.91 NEW ONSET ATRIAL FIBRILLATION (HCC): ICD-10-CM

## 2025-02-04 DIAGNOSIS — F17.200 CURRENT EVERY DAY SMOKER: ICD-10-CM

## 2025-02-04 DIAGNOSIS — F41.1 GENERALIZED ANXIETY DISORDER: ICD-10-CM

## 2025-02-04 PROCEDURE — 99214 OFFICE O/P EST MOD 30 MIN: CPT | Performed by: FAMILY MEDICINE

## 2025-02-04 RX ORDER — LORAZEPAM 0.5 MG/1
0.5 TABLET ORAL EVERY 8 HOURS PRN
Qty: 15 TABLET | Refills: 0 | Status: SHIPPED | OUTPATIENT
Start: 2025-02-04

## 2025-02-04 NOTE — ASSESSMENT & PLAN NOTE
He had an onset of atrial fibrillation back in November.  Orders:    CBC and differential; Future    Comprehensive metabolic panel; Future    Lipid Panel with Direct LDL reflex; Future    TSH, 3rd generation with Free T4 reflex; Future

## 2025-02-04 NOTE — ASSESSMENT & PLAN NOTE
Encourage smoking cessation.  He is trying to cut back.  He does have a CT ordered by oncology.  Orders:    CBC and differential; Future    Comprehensive metabolic panel; Future

## 2025-02-04 NOTE — ASSESSMENT & PLAN NOTE
Blood pressure is controlled at this time.  Orders:    CBC and differential; Future    Comprehensive metabolic panel; Future    Lipid Panel with Direct LDL reflex; Future

## 2025-02-04 NOTE — ASSESSMENT & PLAN NOTE
Anxiety remains pretty significant.  He is being worked up for probable CLL.  I did refill lorazepam.  He would like to have something just to take as needed as opposed to a daily medication  Orders:    CBC and differential; Future    Comprehensive metabolic panel; Future    LORazepam (ATIVAN) 0.5 mg tablet; Take 1 tablet (0.5 mg total) by mouth every 8 (eight) hours as needed for anxiety

## 2025-02-04 NOTE — PROGRESS NOTES
Name: Brando Martinez      : 1960      MRN: [id number]  Encounter Provider: Allen Powell Jr, MD  Encounter Date: 2025   Encounter department: [facility]  :  Assessment & Plan  Type 2 diabetes mellitus without complication, without long-term current use of insulin ([facility])  Monitor A1c.  Lab Results   Component Value Date    HGBA1C 5.3 2024       Orders:    IRIS Diabetic eye exam    Albumin / creatinine urine ratio; Future    Hemoglobin A1C; Future    Essential hypertension  Blood pressure is controlled at this time.  Orders:    CBC and differential; Future    Comprehensive metabolic panel; Future    Lipid Panel with Direct LDL reflex; Future    New onset atrial fibrillation (HCC)  He had an onset of atrial fibrillation back in November.  Orders:    CBC and differential; Future    Comprehensive metabolic panel; Future    Lipid Panel with Direct LDL reflex; Future    TSH, 3rd generation with Free T4 reflex; Future    Current every day smoker  Encourage smoking cessation.  He is trying to cut back.  He does have a CT ordered by oncology.  Orders:    CBC and differential; Future    Comprehensive metabolic panel; Future    Generalized anxiety disorder  Anxiety remains pretty significant.  He is being worked up for probable CLL.  I did refill lorazepam.  He would like to have something just to take as needed as opposed to a daily medication  Orders:    CBC and differential; Future    Comprehensive metabolic panel; Future    LORazepam (ATIVAN) 0.5 mg tablet; Take 1 tablet (0.5 mg total) by mouth every 8 (eight) hours as needed for anxiety    History of basal cell carcinoma (BCC) excision         Morbid obesity with BMI of 40.0-44.9, adult (HCC)    Continue work on weight loss.  Certainly could consider a GLP-1 agonist.  He is not interested in that at this time.  Orders:    CBC and differential; Future    Comprehensive metabolic panel; Future    Hemoglobin A1C; Future     Lipid Panel with Direct LDL reflex; Future    Moderate episode of recurrent major depressive disorder (HCC)  Mood seems to be pretty stable as far as depression.  He admits he does go into episodes of depression and anxiety on occasion but he does not feel he needs a daily medication.  Lorazepam was prescribed for as needed anxiety    Orders:    CBC and differential; Future    Comprehensive metabolic panel; Future    Paroxysmal atrial fibrillation (HCC)  Continue close follow-up with oncology.  Continue Eliquis as well as metoprolol.       Leukocytosis, unspecified type  He had a significant leukocytosis while hospitalized.  Flow cytometry does seem to point towards CLL as the cause.  He is following closely with oncology and has a CT upcoming.                History of Present Illness   HPI  Review of Systems   Constitutional:  Negative for appetite change, chills, fatigue, fever and unexpected weight change.   HENT:  Negative for trouble swallowing.    Eyes:  Negative for visual disturbance.   Respiratory:  Negative for cough, chest tightness, shortness of breath and wheezing.    Cardiovascular:  Negative for chest pain, palpitations and leg swelling.   Gastrointestinal:  Negative for abdominal distention, abdominal pain, blood in stool, constipation and diarrhea.   Endocrine: Negative for polyuria.   Genitourinary:  Negative for difficulty urinating and flank pain.   Musculoskeletal:  Negative for arthralgias and myalgias.   Skin:  Negative for rash.   Neurological:  Negative for dizziness, tremors, syncope and light-headedness.   Hematological:  Negative for adenopathy. Does not bruise/bleed easily.   Psychiatric/Behavioral:  Negative for dysphoric mood, sleep disturbance and suicidal ideas. The patient is nervous/anxious.        Objective   /82   Pulse 72   Wt (!) 145 kg (320 lb 6.4 oz)   SpO2 96%   BMI 43.45 kg/m²      Physical Exam  Cardiovascular:      Pulses: no weak pulses.           Dorsalis  pedis pulses are 2+ on the right side and 2+ on the left side.        Posterior tibial pulses are 2+ on the right side and 2+ on the left side.   Feet:      Right foot:      Skin integrity: No ulcer, skin breakdown, erythema, warmth, callus or dry skin.      Left foot:      Skin integrity: No ulcer, skin breakdown, erythema, warmth, callus or dry skin.         Diabetic Foot Exam    Patient's shoes and socks removed.    Right Foot/Ankle   Right Foot Inspection  Skin Exam: skin normal and skin intact. No dry skin, no warmth, no callus, no erythema, no maceration, no abnormal color, no pre-ulcer, no ulcer and no callus.     Toe Exam: ROM and strength within normal limits.     Sensory   Monofilament testing: intact    Vascular  Capillary refills: < 3 seconds  The right DP pulse is 2+. The right PT pulse is 2+.     Left Foot/Ankle  Left Foot Inspection  Skin Exam: skin normal and skin intact. No dry skin, no warmth, no erythema, no maceration, normal color, no pre-ulcer, no ulcer and no callus.     Toe Exam: ROM and strength within normal limits.     Sensory   Monofilament testing: intact    Vascular  Capillary refills: < 3 seconds  The left DP pulse is 2+. The left PT pulse is 2+.     Assign Risk Category  No deformity present  No loss of protective sensation  No weak pulses  Risk: 0

## 2025-02-04 NOTE — ASSESSMENT & PLAN NOTE
Monitor A1c.  Lab Results   Component Value Date    HGBA1C 5.3 11/25/2024       Orders:    IRIS Diabetic eye exam    Albumin / creatinine urine ratio; Future    Hemoglobin A1C; Future

## 2025-02-04 NOTE — ASSESSMENT & PLAN NOTE
Continue work on weight loss.  Certainly could consider a GLP-1 agonist.  He is not interested in that at this time.  Orders:    CBC and differential; Future    Comprehensive metabolic panel; Future    Hemoglobin A1C; Future    Lipid Panel with Direct LDL reflex; Future

## 2025-02-05 NOTE — ASSESSMENT & PLAN NOTE
Mood seems to be pretty stable as far as depression.  He admits he does go into episodes of depression and anxiety on occasion but he does not feel he needs a daily medication.  Lorazepam was prescribed for as needed anxiety    Orders:    CBC and differential; Future    Comprehensive metabolic panel; Future

## 2025-02-05 NOTE — ASSESSMENT & PLAN NOTE
He had a significant leukocytosis while hospitalized.  Flow cytometry does seem to point towards CLL as the cause.  He is following closely with oncology and has a CT upcoming.

## 2025-02-06 ENCOUNTER — HOSPITAL ENCOUNTER (OUTPATIENT)
Dept: CT IMAGING | Facility: HOSPITAL | Age: 65
Discharge: HOME | End: 2025-02-06
Payer: COMMERCIAL

## 2025-02-06 DIAGNOSIS — D72.820 LYMPHOCYTOSIS: ICD-10-CM

## 2025-02-06 DIAGNOSIS — F17.200 CURRENT EVERY DAY SMOKER: ICD-10-CM

## 2025-02-06 PROCEDURE — 74177 CT ABD & PELVIS W/CONTRAST: CPT

## 2025-02-06 PROCEDURE — 71260 CT THORAX DX C+: CPT

## 2025-02-06 RX ADMIN — IOHEXOL 100 ML: 350 INJECTION, SOLUTION INTRAVENOUS at 08:04

## 2025-02-07 ENCOUNTER — RESULTS FOLLOW-UP (OUTPATIENT)
Dept: FAMILY MEDICINE CLINIC | Facility: CLINIC | Age: 65
End: 2025-02-07

## 2025-02-07 DIAGNOSIS — C91.10 CLL (CHRONIC LYMPHOCYTIC LEUKEMIA) (HCC): ICD-10-CM

## 2025-02-07 DIAGNOSIS — R16.0 LIVER MASS, LEFT LOBE: Primary | ICD-10-CM

## 2025-02-07 NOTE — RESULT ENCOUNTER NOTE
IT LOOKS LIKE HEM/ONC ORDERED PT'S CT ABDOOMEN  WHICH IS ABNORMAL  WILL SEND TO THEM FOR REVIEW AS WELL AS PT'S PCP  - DR CALL

## 2025-02-07 NOTE — PROGRESS NOTES
Spoke with the patient today.  Patient understands that there is a new liver mass with satellite lesions that will need biopsy for further clarification.  Patient was told that this could be something malignant related to primary liver disorder or to the biliary system versus a benign condition and the biopsy would give us the most clarity.  Patient consents to IR biopsy he is aware that he would need a ride home secondary to anesthesia.    The recommendations included tumor marker testing that would need to be completed prior to his appointment with Dr. Cherry.    Patient request that I call his adult daughter Araceli ART gives verbal consent as I could not find HIPAA guidelines while was on the phone with him.    Testing and orders were placed.  I will contact the daughter and we will update this encounter later.    Spoke to the patient's daughter Araceli art around 3:45PM, for approximately 25 minutes.  Explained her father situation reason for consultation initially workup regarding that initial consultation with a diagnosis of CLL and then with this new incidental finding of 1 large liver mass with 2 adjacent lesions.  We discussed potential diagnosis as I did with the patient above.   I answered all of her questions to her understanding and agreement.

## 2025-02-07 NOTE — TELEPHONE ENCOUNTER
Patient returned Dara's call.       Secure chat sent, advised patient he will receive a call from provider.     Please call patient.

## 2025-02-10 ENCOUNTER — PREP FOR PROCEDURE (OUTPATIENT)
Dept: INTERVENTIONAL RADIOLOGY/VASCULAR | Facility: CLINIC | Age: 65
End: 2025-02-10

## 2025-02-10 ENCOUNTER — PATIENT OUTREACH (OUTPATIENT)
Dept: CASE MANAGEMENT | Facility: OTHER | Age: 65
End: 2025-02-10

## 2025-02-10 ENCOUNTER — TELEPHONE (OUTPATIENT)
Dept: HEMATOLOGY ONCOLOGY | Facility: CLINIC | Age: 65
End: 2025-02-10

## 2025-02-10 DIAGNOSIS — R16.0 LIVER MASS: Primary | ICD-10-CM

## 2025-02-10 RX ORDER — SODIUM CHLORIDE 9 MG/ML
30 INJECTION, SOLUTION INTRAVENOUS CONTINUOUS
Status: CANCELLED | OUTPATIENT
Start: 2025-02-10

## 2025-02-11 ENCOUNTER — TELEPHONE (OUTPATIENT)
Dept: HEMATOLOGY ONCOLOGY | Facility: CLINIC | Age: 65
End: 2025-02-11

## 2025-02-11 NOTE — TELEPHONE ENCOUNTER
Called out to patient to advise that jury duty excusal letter has been completed, reviewed, and signed by Dara. Inquired about fax number to send letter to. Patient provided fax number and juror's id. Patient was appreciative. Letter was faxed and scanned into chart.

## 2025-02-12 ENCOUNTER — PATIENT OUTREACH (OUTPATIENT)
Dept: CASE MANAGEMENT | Facility: OTHER | Age: 65
End: 2025-02-12

## 2025-02-12 NOTE — PROGRESS NOTES
Cardiology Follow Up    [facility] [address] [address]  PHONE: [phone]  FAX: [phone]    Brando Martinez  1960  [id number]    Assessment/Plan:  Paroxsymal atrial fibrillation, 11/25/24  S/p WALTER/ CV 11/27/24, now back in A-Fib (rate controlled).   Continue Lopressor 25 mg q12h & Eliquis 5 mg BID  After further reflection since his hospital discharge, the patient thinks he has been feeling the symptoms of A-Fib for many years  I think the only way he could possibly maintain NSR is with ablation and antiarrhythmic. Not open to more procedures at this point. We will hold off on EP referral.     Essential hypertension  Home BP has been averaging 130/80 mmHg     H/o fluid overload  Dry weight ~ 310#. He is hypervolemic today.  He only just starting compliance with Lasix 20 mg BID.  Continue daily weights.   Encouraged low salt diet    RTO in July w/ Dr. Cunningham.    Interval History:   Brando Martinez is a 64 y.o. male with past medical history as below who presents to the office for routine cardiovascular follow-up.  Patient reports since he has become more compliant with Lasix his shortness of breath is improved.  His peripheral edema has improved.  Still feels rare palpitations which last only a minute and improved with deep breathing.  He has been pretty much sedentary, he has depression and anxiety due to recent medical issues.  He wants to get the liver biopsy over with so he has a diagnosis and prognosis so he can put himself in the right frame of mind    I personally reviewed Brando's past medical history, surgical history, family history, social history, home medications and allergies.    Physical Exam:  Vitals:    02/14/25 1041   BP: 144/80   Pulse: 68     Vitals:    02/14/25 1041   Weight: (!) 147 kg (324 lb)       Body mass index is 43.94 kg/m².    Physical Exam  Vitals and nursing note reviewed.   Constitutional:       General: He is not in  acute distress.  HENT:      Head: Normocephalic and atraumatic.      Nose: No congestion.      Mouth/Throat:      Mouth: Mucous membranes are moist.   Eyes:      Conjunctiva/sclera: Conjunctivae normal.   Neck:      Comments: - JVD     Thick neck  Cardiovascular:      Rate and Rhythm: Normal rate. Rhythm irregularly irregular.      Heart sounds: S1 normal and S2 normal. No murmur heard.  Pulmonary:      Effort: Pulmonary effort is normal.      Breath sounds: No wheezing, rhonchi or rales.   Abdominal:      Comments: + central obesity   Musculoskeletal:      Comments: Trace edema bl le to the ankles   Skin:     General: Skin is warm and dry.   Neurological:      General: No focal deficit present.      Mental Status: He is alert.   Psychiatric:         Behavior: Behavior normal.     Data:  ECHO: 11/25/2024 moderate LVH, LVEF 55%. Severely dilated left atrium. Mildly dilated right atrium. AV sclerosis. Moderate MAC with mild MR.     Christy Rosa PA-C  [facility]

## 2025-02-12 NOTE — PROGRESS NOTES
OSW completed chart review, patient has appointment with Dr. Cherry on 2/28/25, OSW will follow for plan

## 2025-02-14 ENCOUNTER — TELEPHONE (OUTPATIENT)
Dept: RADIOLOGY | Facility: HOSPITAL | Age: 65
End: 2025-02-14

## 2025-02-14 ENCOUNTER — OFFICE VISIT (OUTPATIENT)
Dept: CARDIOLOGY CLINIC | Facility: CLINIC | Age: 65
End: 2025-02-14
Payer: COMMERCIAL

## 2025-02-14 ENCOUNTER — TELEPHONE (OUTPATIENT)
Dept: HEMATOLOGY ONCOLOGY | Facility: CLINIC | Age: 65
End: 2025-02-14

## 2025-02-14 VITALS
BODY MASS INDEX: 43.94 KG/M2 | SYSTOLIC BLOOD PRESSURE: 144 MMHG | WEIGHT: 315 LBS | DIASTOLIC BLOOD PRESSURE: 80 MMHG | HEART RATE: 68 BPM

## 2025-02-14 DIAGNOSIS — I48.0 PAROXYSMAL ATRIAL FIBRILLATION (HCC): Primary | ICD-10-CM

## 2025-02-14 DIAGNOSIS — I48.91 ATRIAL FIBRILLATION (HCC): ICD-10-CM

## 2025-02-14 DIAGNOSIS — I10 ESSENTIAL HYPERTENSION: ICD-10-CM

## 2025-02-14 PROCEDURE — 99214 OFFICE O/P EST MOD 30 MIN: CPT | Performed by: PHYSICIAN ASSISTANT

## 2025-02-14 RX ORDER — FUROSEMIDE 20 MG/1
20 TABLET ORAL 2 TIMES DAILY
Status: SHIPPED
Start: 2025-02-14

## 2025-02-18 ENCOUNTER — TELEPHONE (OUTPATIENT)
Age: 65
End: 2025-02-18

## 2025-02-18 DIAGNOSIS — R16.0 LIVER MASS, LEFT LOBE: Primary | ICD-10-CM

## 2025-02-18 NOTE — TELEPHONE ENCOUNTER
Please send to -additional details regarding the patient's case are sent in my in basket messaging.  I communicated with both the daughter and left a message for the patient stating that he would need to undergo MRI imaging of his liver.

## 2025-02-18 NOTE — TELEPHONE ENCOUNTER
Pt supposed liver bx today but the surgeon who was going to do it said that he doesn't need to do this, so he did not have it.  Araceli called & would like to speak with a provider to explain what is going on.

## 2025-02-18 NOTE — PROGRESS NOTES
Telephone Note  [facility]  [address]  [address]  [place] [address] [phone]    Brando MENDENHALL Samueljuanpablo,1960,[id number]  02/18/25        A/P:  1. Liver mass, left lobe (Primary)      Called and spoke with the patient's voicemail leaving a detailed message-recommended follow-up imaging with MRI.     Called and spoke with patient's daughter.  Interventional radiology believes the patient's mass to most likely be a hemangioma.  Biopsy is not necessary to prove hemangioma, MRI imaging recommended w/ and w/o contrast.     Orders were placed.  This office will reach out to schedule patient's testing.    - MRI abdomen w wo contrast; Future

## 2025-02-18 NOTE — TELEPHONE ENCOUNTER
Please find out who was supposed to be doing the procedure.  Patient needed the biopsy-is likely that the patient has 2 primary malignancies; biopsy was taken from the second malignancy.  I would like to speak with the provider/canceling department. Could you help arrange?

## 2025-02-20 ENCOUNTER — TELEPHONE (OUTPATIENT)
Age: 65
End: 2025-02-20

## 2025-02-20 ENCOUNTER — TELEPHONE (OUTPATIENT)
Dept: HEMATOLOGY ONCOLOGY | Facility: CLINIC | Age: 65
End: 2025-02-20

## 2025-02-20 ENCOUNTER — HOSPITAL ENCOUNTER (OUTPATIENT)
Dept: MRI IMAGING | Facility: HOSPITAL | Age: 65
End: 2025-02-20
Payer: COMMERCIAL

## 2025-02-20 DIAGNOSIS — R16.0 LIVER MASS, LEFT LOBE: Primary | ICD-10-CM

## 2025-02-20 DIAGNOSIS — R16.0 LIVER MASS, LEFT LOBE: ICD-10-CM

## 2025-02-20 DIAGNOSIS — F41.8 TEST ANXIETY: ICD-10-CM

## 2025-02-20 PROCEDURE — 74183 MRI ABD W/O CNTR FLWD CNTR: CPT

## 2025-02-20 PROCEDURE — A9585 GADOBUTROL INJECTION: HCPCS | Performed by: PHYSICIAN ASSISTANT

## 2025-02-20 RX ORDER — LORAZEPAM 0.5 MG/1
TABLET ORAL
Qty: 2 TABLET | Refills: 0 | Status: SHIPPED | OUTPATIENT
Start: 2025-02-20

## 2025-02-20 RX ORDER — GADOBUTROL 604.72 MG/ML
14 INJECTION INTRAVENOUS
Status: COMPLETED | OUTPATIENT
Start: 2025-02-20 | End: 2025-02-20

## 2025-02-20 RX ADMIN — GADOBUTROL 14 ML: 604.72 INJECTION INTRAVENOUS at 18:45

## 2025-02-20 NOTE — TELEPHONE ENCOUNTER
"Called out to Brando to inquire about if he is able to attend MRI that was scheduled for him today 2/20/25 at 4:15 pm. Advised that Dara would like imaging done prior to appointment with Dr. Cherry on 2/28/25. Advised that there are no open appointments for another MRI until 3/22/25. Was on call for total of 20 minutes. Patient stated that he is \"livid\" especially with biopsy. Reviewed Dara's telephone encounter with patient and daughter from earlier and repeated what was dicussed and reasoning for MRI. Recommended he take the medication he has available prior to the MRI as we have transportation set up for him. Patient states he is having anxiety about MRI itself but did agree to attend MRI.   "

## 2025-02-20 NOTE — TELEPHONE ENCOUNTER
MRI is needed to confirm hemangioma - a benign mass.    We wanted the MRI prior to appointment with Dr. Cherry for review next week.   Rides have been set up to assist the patient to get to appt.   Discussed with Dad (Brando) earlier today who was agreeable to MRI.  Advised using Lorazepam - medication to help with test anxiety.

## 2025-02-20 NOTE — PROGRESS NOTES
Spoke with the patient today for over 17 minutes.  We discussed rationale regarding discontinuation of biopsy, comparison is made with previous testing from prior CT scans and MRIs demonstrating stability of mass.  Hemangiomas can bleed and therefore MRI is the diagnostic test of choice.    Patient's had an MRI before, these have been uncomfortable for him I have offered him medication to help with this.  [place] records were reviewed.  Patient did receive antianxiety medication with new diagnosis.  Patient does not have medication left at home.  New medication prescribed now.

## 2025-02-20 NOTE — TELEPHONE ENCOUNTER
Patient just called in & he is very upset & only wants to speak with Dara Edward so she can explain what is going on & why he needs to get a MRI.  I called the office & Dara was in with her 9am appt, but I left a msg with Sarah to have her call the patient back as soon as she can.

## 2025-02-20 NOTE — TELEPHONE ENCOUNTER
Brando called back in again & stated that he can't do the MRI today because he is all worked up & it isn't life threatening.  He said that he wants to speak with Dara again.

## 2025-02-20 NOTE — TELEPHONE ENCOUNTER
Patients daughter called asking for a direct call back from Dara Edward in regards to his dad and the MRI that is set for later today. She has a lot of concerns going on with the test and diagnosis

## 2025-02-20 NOTE — TELEPHONE ENCOUNTER
Spoke to the patient's daughter today updated her regarding MRI, right situation with left.  Medications used as antianxiety for the test/procedure.    All of her questions were answered to her understanding.  Patient thanked me for/appreciative my time.   [FreeTextEntry1] : 70-year-old male status post previous right inguinal hernia repair now with recently noted left inguinal hernia.

## 2025-02-21 ENCOUNTER — TELEPHONE (OUTPATIENT)
Dept: HEMATOLOGY ONCOLOGY | Facility: CLINIC | Age: 65
End: 2025-02-21

## 2025-02-21 NOTE — TELEPHONE ENCOUNTER
Called patient regarding request to move appt time to late morning/early afternoon.   Moved appt down to 11:30am. Called patient and left a voicemail with new appt time.

## 2025-02-24 ENCOUNTER — TELEPHONE (OUTPATIENT)
Dept: RADIOLOGY | Facility: HOSPITAL | Age: 65
End: 2025-02-24

## 2025-02-24 ENCOUNTER — TELEPHONE (OUTPATIENT)
Age: 65
End: 2025-02-24

## 2025-02-24 NOTE — TELEPHONE ENCOUNTER
Patient's daughter calling in stating they were told by the doctor that the Liver Biopsy was not necessary and would like the order cancelled.

## 2025-02-24 NOTE — TELEPHONE ENCOUNTER
Araceli is calling just make sure that someone will be calling back regarding her or her dad back to clarify that the liver biopsy is not necessary.

## 2025-02-24 NOTE — TELEPHONE ENCOUNTER
PT called in returning our call to reschedule him for his liver BX. PT states he no longer needs Bx. He had an MRI done and the Dr told him the liver mass is not cancer and he does not need to have the procedure done.

## 2025-02-24 NOTE — TELEPHONE ENCOUNTER
Call from Brando. He is asking for Dr. Cherry to call him to discuss the IR liver biopsy to determine if it is necessary for him to have this since he had the MRI and it was determined that he has a hemangioma.

## 2025-02-28 ENCOUNTER — OFFICE VISIT (OUTPATIENT)
Dept: HEMATOLOGY ONCOLOGY | Facility: CLINIC | Age: 65
End: 2025-02-28
Payer: COMMERCIAL

## 2025-02-28 VITALS
SYSTOLIC BLOOD PRESSURE: 132 MMHG | RESPIRATION RATE: 18 BRPM | HEIGHT: 72 IN | HEART RATE: 62 BPM | DIASTOLIC BLOOD PRESSURE: 80 MMHG | BODY MASS INDEX: 42.66 KG/M2 | WEIGHT: 315 LBS | TEMPERATURE: 97.8 F | OXYGEN SATURATION: 94 %

## 2025-02-28 DIAGNOSIS — R16.0 LIVER MASS, LEFT LOBE: ICD-10-CM

## 2025-02-28 DIAGNOSIS — C91.10 CLL (CHRONIC LYMPHOCYTIC LEUKEMIA) (HCC): Primary | ICD-10-CM

## 2025-02-28 PROCEDURE — 99214 OFFICE O/P EST MOD 30 MIN: CPT | Performed by: INTERNAL MEDICINE

## 2025-02-28 NOTE — PROGRESS NOTES
Name: Brando Martinez      : 1960      MRN: [id number]  Encounter Provider: Dania Cherry MD  Encounter Date: 2025   Encounter department: [facility] [place]  :  Assessment & Plan  CLL (chronic lymphocytic leukemia) (HCC)  The patient was educated about the diagnosis of CLL.    He seems to fit stage Gregory 0 CLL without adenopathy or splenomegaly.  We did discuss pursuing further workup including I GVH mutational status and FISH panel for CLL.  We did discuss extensively the usual indication for CLL.  The patient does not seem to have any constitutional symptoms or any indication for treatment.  He was told that we will continue to monitor him closely.    Orders:    CBC and differential; Future    Comprehensive metabolic panel; Future    Magnesium; Future    CLL Prognosis: IGVH Mutation Analysis by PCR; Future    CLL profile, fish; Future    LD,Blood; Future    IgG, IgA, IgM; Future    Immunoglobulin free LT chains blood; Future    Protein, Total and Protein Electrophoresis with Immunofixation; Future    Liver mass, left lobe  We did discuss the recent CT scan of the abdomen which showed abnormal findings in the liver suspicious for malignant process.  However, the MRI of the abdomen showed a rather hemangioma status post embolization.  He was reassured that there is no obvious malignant process in the liver.  He did mention that he did have embolization of the large hemangioma in the liver many years ago.  He will most likely need another imaging at 1 point in the future to make sure that the abnormal finding in the liver is stable.           Return in about 5 months (around 2025) for Office Visit 20 min, Labs.    History of Present Illness   Chief Complaint   Patient presents with    Follow-up   This is a 64-year-old male with history of depression, hyperlipidemia, hemangioma of the liver status embolization many years ago.  The patient apparently had elevated white  cell count with mainly lymphocytosis which was investigated extensively just recently.  Flow cytometry showed B-cell population, CD5 positive, CD23 positive which seems to be compatible with chronic lymphocytic leukemia.    He did have a CT scan of the chest and pelvis on 2/6/2025 for staging which showed abnormal finding in the left hepatic lobe suspicious for malignant process.  No adenopathy or splenomegaly was noted.  The patient was then evaluated with an MRI of the abdomen which showed rather chronic hepatic hemangioma status post IR sclerosis and embolization.  No suspicious process in the liver was noted on the MRI.         Review of Systems   Constitutional:  Positive for fatigue. Negative for chills and fever.   HENT:  Negative for ear pain and sore throat.    Eyes:  Negative for pain and visual disturbance.   Respiratory:  Positive for shortness of breath. Negative for cough.    Cardiovascular:  Negative for chest pain and palpitations.   Gastrointestinal:  Negative for abdominal pain and vomiting.   Genitourinary:  Negative for dysuria and hematuria.   Musculoskeletal:  Negative for arthralgias and back pain.   Skin:  Negative for color change and rash.   Neurological:  Negative for seizures and syncope.   Psychiatric/Behavioral:  Positive for sleep disturbance.    All other systems reviewed and are negative.    Medical History Reviewed by provider this encounter:  Tobacco  Allergies  Meds  Problems  Med Hx  Surg Hx  Fam Hx     .  Current Outpatient Medications on File Prior to Visit   Medication Sig Dispense Refill    apixaban (ELIQUIS) 5 mg Take 1 tablet (5 mg total) by mouth 2 (two) times a day 60 tablet 11    furosemide (LASIX) 20 mg tablet Take 1 tablet (20 mg total) by mouth 2 (two) times a day      LORazepam (ATIVAN) 0.5 mg tablet Take 1 tablet (0.5 mg total) by mouth every 8 (eight) hours as needed for anxiety 15 tablet 0    LORazepam (Ativan) 0.5 mg tablet Take 0.5 mg 1 hour prior to  procedure.  You may take a second tablet as needed for anxiety up to procedure. 2 tablet 0    metFORMIN (GLUCOPHAGE) 1000 MG tablet TAKE 1 TABLET BY MOUTH EVERY DAY WITH BREAKFAST 90 tablet 1    metoprolol tartrate (LOPRESSOR) 25 mg tablet Take 1 tablet (25 mg total) by mouth every 12 (twelve) hours 60 tablet 11     No current facility-administered medications on file prior to visit.      Social History     Tobacco Use    Smoking status: Every Day     Current packs/day: 0.50     Average packs/day: 0.5 packs/day for 21.2 years (10.6 ttl pk-yrs)     Types: Cigarettes     Start date: 2004    Smokeless tobacco: Never    Tobacco comments:     8-10 cigs per day  last smoked 2.17.25   Vaping Use    Vaping status: Never Used   Substance and Sexual Activity    Alcohol use: No    Drug use: Never    Sexual activity: Yes     Partners: Female         Objective   /80 (BP Location: Right arm, Patient Position: Sitting, Cuff Size: Adult)   Pulse 62   Temp 97.8 °F (36.6 °C)   Resp 18   Ht 6' (1.829 m)   Wt (!) 148 kg (327 lb)   SpO2 94%   BMI 44.35 kg/m²     Pain Screening:  Pain Score:   1  ECOG   0  Physical Exam  Constitutional:       Appearance: He is well-developed.   HENT:      Head: Normocephalic and atraumatic.      Nose: Nose normal.   Eyes:      General: No scleral icterus.        Right eye: No discharge.         Left eye: No discharge.      Conjunctiva/sclera: Conjunctivae normal.      Pupils: Pupils are equal, round, and reactive to light.   Neck:      Thyroid: No thyromegaly.      Trachea: No tracheal deviation.   Cardiovascular:      Rate and Rhythm: Normal rate and regular rhythm.      Heart sounds: Normal heart sounds. No murmur heard.     No friction rub.   Pulmonary:      Effort: Pulmonary effort is normal. No respiratory distress.      Breath sounds: Normal breath sounds. No wheezing or rales.   Chest:      Chest wall: No tenderness.   Abdominal:      General: There is no distension.      Palpations:  Abdomen is soft. There is no hepatomegaly or splenomegaly.      Tenderness: There is no abdominal tenderness. There is no guarding or rebound.   Musculoskeletal:         General: No tenderness or deformity. Normal range of motion.      Cervical back: Normal range of motion and neck supple.   Lymphadenopathy:      Cervical: No cervical adenopathy.   Skin:     General: Skin is warm and dry.      Coloration: Skin is not pale.      Findings: No erythema or rash.   Neurological:      Mental Status: He is alert and oriented to person, place, and time.      Cranial Nerves: No cranial nerve deficit.      Coordination: Coordination normal.      Deep Tendon Reflexes: Reflexes are normal and symmetric.   Psychiatric:         Behavior: Behavior normal.         Thought Content: Thought content normal.         Judgment: Judgment normal.         Labs: I have reviewed the following labs:  Lab Results   Component Value Date/Time    WBC 31.20 (H) 02/18/2025 07:08 AM    RBC 4.77 02/18/2025 07:08 AM    Hemoglobin 14.0 02/18/2025 07:08 AM    Hematocrit 43.8 02/18/2025 07:08 AM    MCV 92 02/18/2025 07:08 AM    MCH 29.4 02/18/2025 07:08 AM    RDW 15.3 (H) 02/18/2025 07:08 AM    Platelets 137 (L) 02/18/2025 07:08 AM    Segmented % 13 (L) 02/18/2025 07:08 AM    Lymphocytes % 84 (H) 02/18/2025 07:08 AM    Monocytes % 2 (L) 02/18/2025 07:08 AM    Eosinophils Relative 1 02/18/2025 07:08 AM    Basophils Relative 0 02/18/2025 07:08 AM    Immature Grans % 0 02/18/2025 07:08 AM    Absolute Neutrophils 3.88 02/18/2025 07:08 AM     Lab Results   Component Value Date/Time    Potassium 4.5 01/28/2025 09:58 AM    Chloride 102 01/28/2025 09:58 AM    CO2 33 (H) 01/28/2025 09:58 AM    BUN 25 01/28/2025 09:58 AM    Creatinine 0.89 01/28/2025 09:58 AM    Creatinine 0.78 11/27/2024 05:35 AM    Calcium 9.9 01/28/2025 09:58 AM    AST 14 01/28/2025 09:58 AM    ALT 14 01/28/2025 09:58 AM    Alkaline Phosphatase 54 01/28/2025 09:58 AM    Protein, Total 6.7  "01/28/2025 09:58 AM    Albumin 4.5 01/28/2025 09:58 AM    Albumin 4.2 11/26/2024 05:09 AM    TOTAL BILIRUBIN 1.3 (H) 01/28/2025 09:58 AM    eGFR 95 01/28/2025 09:58 AM    eGFR 95 11/27/2024 05:35 AM     Lab Results   Component Value Date/Time    WBC 31.20 (H) 02/18/2025 07:08 AM    RBC 4.77 02/18/2025 07:08 AM    Hemoglobin 14.0 02/18/2025 07:08 AM    Hematocrit 43.8 02/18/2025 07:08 AM    MCV 92 02/18/2025 07:08 AM    MCH 29.4 02/18/2025 07:08 AM    RDW 15.3 (H) 02/18/2025 07:08 AM    Platelets 137 (L) 02/18/2025 07:08 AM    Segmented % 13 (L) 02/18/2025 07:08 AM    Lymphocytes % 84 (H) 02/18/2025 07:08 AM    Monocytes % 2 (L) 02/18/2025 07:08 AM    Eosinophils Relative 1 02/18/2025 07:08 AM    Basophils Relative 0 02/18/2025 07:08 AM    Immature Grans % 0 02/18/2025 07:08 AM    Absolute Neutrophils 3.88 02/18/2025 07:08 AM      Lab Results   Component Value Date/Time    Sodium 141 01/28/2025 09:58 AM    Potassium 4.5 01/28/2025 09:58 AM    Chloride 102 01/28/2025 09:58 AM    CO2 33 (H) 01/28/2025 09:58 AM    ANION GAP 6 01/28/2025 09:58 AM    ANION GAP 6 11/27/2024 05:35 AM    BUN 25 01/28/2025 09:58 AM    Creatinine 0.89 01/28/2025 09:58 AM    Creatinine 0.78 11/27/2024 05:35 AM    Glucose, Random 151 (H) 01/28/2025 09:58 AM    Calcium 9.9 01/28/2025 09:58 AM    AST 14 01/28/2025 09:58 AM    ALT 14 01/28/2025 09:58 AM    Alkaline Phosphatase 54 01/28/2025 09:58 AM    Protein, Total 6.7 01/28/2025 09:58 AM    Albumin 4.5 01/28/2025 09:58 AM    Albumin 4.2 11/26/2024 05:09 AM    TOTAL BILIRUBIN 1.3 (H) 01/28/2025 09:58 AM    eGFR 95 01/28/2025 09:58 AM    eGFR 95 11/27/2024 05:35 AM      No results found for: \"SPEP\", \"UPEP\", \"BETA2\", \"LDH\", \"IGKAPPA\", \"IGLAMDA\", \"GAMMAGLOB\", \"IGG\", \"IGA\", \"IGM\"   Lab Results   Component Value Date/Time    TSH 3RD GENERATON 4.277 11/25/2024 01:01 PM      No results found for: \"IRON\", \"CONCFE\", \"FERRITIN\", \"JQBUIZWG03\", \"FOLATE\", \"COPPER\", \"EPOREFLAB\", \"ERYTHROPRO\", \"ESR\", \"CRP\", " "\"HIVAGAB\", \"HEPATITIS\"           "

## 2025-03-03 ENCOUNTER — PATIENT OUTREACH (OUTPATIENT)
Dept: CASE MANAGEMENT | Facility: OTHER | Age: 65
End: 2025-03-03

## 2025-03-10 ENCOUNTER — PATIENT OUTREACH (OUTPATIENT)
Dept: CASE MANAGEMENT | Facility: OTHER | Age: 65
End: 2025-03-10

## 2025-03-10 DIAGNOSIS — F33.1 MODERATE EPISODE OF RECURRENT MAJOR DEPRESSIVE DISORDER (HCC): ICD-10-CM

## 2025-03-10 DIAGNOSIS — C91.10 CHRONIC LYMPHOCYTIC LEUKEMIA (HCC): ICD-10-CM

## 2025-03-10 DIAGNOSIS — Z85.828 HISTORY OF BASAL CELL CARCINOMA (BCC) EXCISION: Primary | ICD-10-CM

## 2025-03-10 DIAGNOSIS — Z98.890 HISTORY OF BASAL CELL CARCINOMA (BCC) EXCISION: Primary | ICD-10-CM

## 2025-03-10 DIAGNOSIS — F41.1 GENERALIZED ANXIETY DISORDER: ICD-10-CM
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
## 2025-03-18 ENCOUNTER — PATIENT OUTREACH (OUTPATIENT)
Dept: CASE MANAGEMENT | Facility: OTHER | Age: 65
End: 2025-03-18

## 2025-03-19 ENCOUNTER — CONSULT (OUTPATIENT)
Dept: PALLIATIVE MEDICINE | Facility: CLINIC | Age: 65
End: 2025-03-19
Payer: COMMERCIAL

## 2025-03-19 VITALS
SYSTOLIC BLOOD PRESSURE: 160 MMHG | BODY MASS INDEX: 44.35 KG/M2 | OXYGEN SATURATION: 90 % | TEMPERATURE: 97.5 F | HEART RATE: 86 BPM | DIASTOLIC BLOOD PRESSURE: 80 MMHG | WEIGHT: 315 LBS | RESPIRATION RATE: 18 BRPM

## 2025-03-19 DIAGNOSIS — Z98.890 HISTORY OF BASAL CELL CARCINOMA (BCC) EXCISION: ICD-10-CM

## 2025-03-19 DIAGNOSIS — C91.10 CLL (CHRONIC LYMPHOCYTIC LEUKEMIA) (HCC): Primary | ICD-10-CM

## 2025-03-19 DIAGNOSIS — Z85.828 HISTORY OF BASAL CELL CARCINOMA (BCC) EXCISION: ICD-10-CM

## 2025-03-19 DIAGNOSIS — F33.1 MODERATE EPISODE OF RECURRENT MAJOR DEPRESSIVE DISORDER (HCC): ICD-10-CM

## 2025-03-19 DIAGNOSIS — F41.1 GENERALIZED ANXIETY DISORDER: ICD-10-CM

## 2025-03-19 DIAGNOSIS — C91.10 CHRONIC LYMPHOCYTIC LEUKEMIA (HCC): ICD-10-CM

## 2025-03-19 PROCEDURE — 99203 OFFICE O/P NEW LOW 30 MIN: CPT | Performed by: INTERNAL MEDICINE

## 2025-03-19 NOTE — PROGRESS NOTES
Name: Brando Martinez      : 1960      MRN: [id number]  Encounter Provider: Nichole Meier MD  Encounter Date: 3/19/2025   Encounter department: [facility] PALLIATIVE CARE [place]  :  Assessment & Plan  Moderate episode of recurrent major depressive disorder (HCC)  He was referred here by onc SERA to discuss MMJ  Explained process of MMJ registration and certification but he does not want to go through that  Continue management per PCP  Orders:    Ambulatory Referral to Palliative Care    Generalized anxiety disorder  He was referred here by onc SW to discuss MMJ  Explained process of MMJ registration and certification but he does not want to go through that  Continue management per PCP  Orders:    Ambulatory Referral to Palliative Care    CLL (chronic lymphocytic leukemia) (HCC)  newly diagnosed stage Gregory 0 CLL without adenopathy or splenomegaly.  Currently on active surveillance off of treatments         Follow up  RTO only if cancer worsens    Decisional apparatus: Patient is competent on my exam today. If competence is lost, patient's substitute decision maker would default to daughter by [place].     PDMP Review: I have reviewed the patient's controlled substance dispensing history in the Prescription Drug Monitoring Program in compliance with the [facility] regulations before prescribing any controlled substances.    2025 1 Lorazepam 0.5 Mg Tablet 2.00 1 Am Ful [id number] Weg (7053) 0 1.00 LME Comm Ins [place]   2025 1 Lorazepam 0.5 Mg Tablet 15.00 5 De Mcg [id number] Weg (7053) 0 1.50 [place]       History of Present Illness   Brando Martinez is a 64 y.o. male with newly diagnosed stage Gregory 0 CLL without adenopathy or splenomegaly. Currently on just active surveillance off of treatments. He is referred here as a recommendation from onc SERA to discuss MMJ for his long standing anxiety/depression.     Met with patient and introduced palliative medicine. He spent time  discussing how he was told he had liver cancer and was upset that his daughter was told of this. He said his wife  of complications of a hip replacement surgery and so he distrusts some doctors. He does not want to upset his daughter more so they call his CLL blood disorder. He said he has long standing anxiety/depression and is a bit upset that since being in the hospital, he had to keep talking about his anxiety when he spent years burying his issues down. He does not want keep talking about it. He does not want to go to psychiatry. He only wants meds that works short acting. He does not want therapy. He said he does not really believe he needs to be here. He was told to come here to discuss MMJ.     I explained that MMJ can be tried but not guaranteed to work. He can try a few products until he finds the right one for him. As I was explaining the process, he said he will not proceed with it because he has to go online and he can't do that and he distrusts giving his information online.     He has no palliative medicine needs at this time. No need for a follow up.          Objective   There were no vitals taken for this visit.    Physical Exam  Constitutional:       General: He is not in acute distress.     Appearance: He is not ill-appearing, toxic-appearing or diaphoretic.   HENT:      Head: Normocephalic and atraumatic.   Eyes:      General: No scleral icterus.        Right eye: No discharge.         Left eye: No discharge.   Pulmonary:      Effort: Pulmonary effort is normal. No respiratory distress.   Abdominal:      General: Abdomen is flat. There is no distension.   Skin:     Coloration: Skin is not jaundiced or pale.   Neurological:      General: No focal deficit present.      Mental Status: He is alert and oriented to person, place, and time.   Psychiatric:         Mood and Affect: Mood normal.         Behavior: Behavior normal.         Thought Content: Thought content normal.         Judgment:  Judgment normal.         Recent labs:  Lab Results   Component Value Date/Time    SODIUM 141 01/28/2025 09:58 AM    SODIUM 140 06/03/2022 10:48 AM    K 4.5 01/28/2025 09:58 AM    K 4.5 06/03/2022 10:48 AM    BUN 25 01/28/2025 09:58 AM    BUN 21 06/03/2022 10:48 AM    CREATININE 0.89 01/28/2025 09:58 AM    CREATININE 0.78 11/27/2024 05:35 AM    CREATININE 0.81 06/03/2022 10:48 AM    GLUC 151 (H) 01/28/2025 09:58 AM    GLUC 106 (H) 06/03/2022 10:48 AM    CALCIUM 9.9 01/28/2025 09:58 AM    CALCIUM 9.1 06/03/2022 10:48 AM    AST 14 01/28/2025 09:58 AM    AST 17 06/03/2022 10:48 AM    ALT 14 01/28/2025 09:58 AM    ALT 30 06/03/2022 10:48 AM    ALB 4.5 01/28/2025 09:58 AM    ALB 4.2 11/26/2024 05:09 AM    ALB 3.8 06/03/2022 10:48 AM    TP 6.7 01/28/2025 09:58 AM    TP 6.5 06/03/2022 10:48 AM    EGFR 95 01/28/2025 09:58 AM    EGFR 95 11/27/2024 05:35 AM    EGFR 100 06/03/2022 10:48 AM    EGFR 94 04/30/2020 08:30 AM     Lab Results   Component Value Date/Time    HGB 14.0 02/18/2025 07:08 AM    WBC 31.20 (H) 02/18/2025 07:08 AM     (L) 02/18/2025 07:08 AM    INR 1.09 02/18/2025 07:08 AM    PTT 30 11/25/2024 01:01 PM     Lab Results   Component Value Date/Time    ICN9AELILDMS 4.277 11/25/2024 01:01 PM       Recent Imaging:  Procedure: MRI abdomen w wo contrast  Result Date: 2/21/2025  Impression: Decreased size of chronic hepatic hemangioma compared to studies dating back to 2011 status post IR sclerosis and embolization. Other stable and nonemergent findings above. Workstation performed: [id number]     Procedure: CT chest abdomen pelvis w contrast  Result Date: 2/6/2025  Impression: 1. Dominant lesion in the left hepatic lobe with progressive enhancement on delayed images measures 7 x 7.3 cm highly suspicious for cholangiocarcinoma. At least 2 additional lesions in the caudate lobe and segment 7 consistent with metastatic disease. Tissue sampling recommended for definitive diagnosis. 2. No enlarged dee hepatis or  upper abdominal lymph nodes. No peritoneal lesions. No evidence of pulmonary metastatic disease. 3. Colonic and jejunal diverticulosis. 4. Bilateral low-attenuation adrenal nodules consistent with adenoma. Electronically signed: 02/06/2025 07:40 PM Oswald Duarte MD    Procedure: XR chest portable  Result Date: 11/26/2024  Impression: Bibasilar base opacities which likely represent some combination of pleural effusions and/or atelectasis. Developing pneumonia may present a similar appearance in the appropriate clinical setting. Right-sided opacity is not appreciably changed while the left-sided opacity appears slightly improved. Mild pulmonary vascular congestion is suggested, similar to radiograph of the previous day. Resident: Domonique Lazo I, the attending radiologist, have reviewed the images and agree with the final report above. Workstation performed: [id number]     Procedure: XR chest 1 view portable  Result Date: 11/25/2024  Impression: There is hazy bibasilar opacity which may represent a combination of layered effusion and parenchymal opacity. Workstation performed: [id number]       Administrative Statements   I have spent a total time of 25 minutes in caring for this patient on the day of the visit/encounter including Diagnostic results, Risks and benefits of tx options, Instructions for management, Patient and family education, Importance of tx compliance, Risk factor reductions, Impressions, Counseling / Coordination of care, Documenting in the medical record, Reviewing/placing orders in the medical record (including tests, medications, and/or procedures), and Obtaining or reviewing history  .   Topics discussed with the patient / family include symptom assessment and management, psychosocial support, medical marijuana, and supportive listening.    Nichole Meier MD  Palliative Medicine & Supportive Care  Internal Medicine  Available via [employer]  Office: [phone]  Fax: [phone]

## 2025-04-09 ENCOUNTER — OFFICE VISIT (OUTPATIENT)
Dept: PALLIATIVE MEDICINE | Facility: CLINIC | Age: 65
End: 2025-04-09
Payer: COMMERCIAL

## 2025-04-09 VITALS
OXYGEN SATURATION: 95 % | HEART RATE: 58 BPM | WEIGHT: 315 LBS | DIASTOLIC BLOOD PRESSURE: 64 MMHG | SYSTOLIC BLOOD PRESSURE: 154 MMHG | HEIGHT: 72 IN | TEMPERATURE: 98 F | BODY MASS INDEX: 42.66 KG/M2

## 2025-04-09 DIAGNOSIS — Z51.5 PALLIATIVE CARE BY SPECIALIST: ICD-10-CM

## 2025-04-09 DIAGNOSIS — C91.10 CLL (CHRONIC LYMPHOCYTIC LEUKEMIA) (HCC): Primary | ICD-10-CM

## 2025-04-09 DIAGNOSIS — Z79.899 MEDICAL MARIJUANA USE: ICD-10-CM

## 2025-04-09 DIAGNOSIS — F41.1 GENERALIZED ANXIETY DISORDER: ICD-10-CM

## 2025-04-09 PROCEDURE — 99214 OFFICE O/P EST MOD 30 MIN: CPT | Performed by: INTERNAL MEDICINE

## 2025-04-09 NOTE — ASSESSMENT & PLAN NOTE
The patient qualifies for use of MMJ in the [facility] by having the following medical condition - cancer.    From a palliative care stand point the patient is suffering with anxiety.  These symptoms and side effects might be alleviated with use of MMJ products.  The patient registered online.  The patient read and I reviewed the informed consent document with the patient.  I answered all questions related to it before the patient signed it.  The patient's medical certification was completed on this date.  The patient was given a signed copy of the informed consent and medical certification.    I issued a certification for MMJ use with palliative intent -  To help alleviate cancer related symptoms and cancer treatment related side effects.  I do not endorse the belief that MMJ can treat cancer and strongly encouraged the patient to continue treatments and surveillance as recommend by cancer specialists.

## 2025-04-09 NOTE — PROGRESS NOTES
Name: Brando Martinez      : 1960      MRN: [id number]  Encounter Provider: Andrzej Zambrano MD  Encounter Date: 2025   Encounter department: [facility] PALLIATIVE CARE [place]  :  Assessment & Plan  CLL (chronic lymphocytic leukemia) (HCC)         Palliative care by specialist  RTC in 1 year or sooner as needed       Medical marijuana use  The patient qualifies for use of MMJ in the state [facility] by having the following medical condition - cancer.    From a palliative care stand point the patient is suffering with anxiety.  These symptoms and side effects might be alleviated with use of MMJ products.  The patient registered online.  The patient read and I reviewed the informed consent document with the patient.  I answered all questions related to it before the patient signed it.  The patient's medical certification was completed on this date.  The patient was given a signed copy of the informed consent and medical certification.    I issued a certification for MMJ use with palliative intent -  To help alleviate cancer related symptoms and cancer treatment related side effects.  I do not endorse the belief that MMJ can treat cancer and strongly encouraged the patient to continue treatments and surveillance as recommend by cancer specialists.         Generalized anxiety disorder  - hopeful that MMJ will help               PDMP Review: I have reviewed the patient's controlled substance dispensing history in the Prescription Drug Monitoring Program in compliance with the [facility] regulations before prescribing any controlled substances.    History of Present Illness   Brando Martinez is a 64 y.o. male with new dx of CLL currently on observation/undergoing further monitoring who presents to follow up on symptoms of chronic anxiety, now worsened in the context of his new dx.  Reports having some element of this his entire life, but understandably finds his new diagnosis challenging, especially with the extreme  variability in prognosis.  Associated with poor sleep.  Denies other concerns.                      Objective   There were no vitals taken for this visit.    Physical Exam  Vitals and nursing note reviewed.   Constitutional:       General: He is not in acute distress.     Appearance: He is not ill-appearing, toxic-appearing or diaphoretic.   HENT:      Head: Normocephalic and atraumatic.      Right Ear: External ear normal.      Left Ear: External ear normal.      Nose: Nose normal.      Mouth/Throat:      Mouth: Mucous membranes are moist.   Eyes:      General:         Right eye: No discharge.         Left eye: No discharge.   Cardiovascular:      Rate and Rhythm: Normal rate.   Pulmonary:      Effort: No respiratory distress.   Abdominal:      General: There is no distension.   Musculoskeletal:         General: No swelling or deformity.   Skin:     General: Skin is warm and dry.   Neurological:      General: No focal deficit present.      Mental Status: He is alert. Mental status is at baseline.   Psychiatric:         Mood and Affect: Mood normal.         Behavior: Behavior normal.         Recent labs:  Lab Results   Component Value Date/Time    SODIUM 141 01/28/2025 09:58 AM    SODIUM 140 06/03/2022 10:48 AM    K 4.5 01/28/2025 09:58 AM    K 4.5 06/03/2022 10:48 AM    BUN 25 01/28/2025 09:58 AM    BUN 21 06/03/2022 10:48 AM    CREATININE 0.89 01/28/2025 09:58 AM    CREATININE 0.78 11/27/2024 05:35 AM    CREATININE 0.81 06/03/2022 10:48 AM    GLUC 151 (H) 01/28/2025 09:58 AM    GLUC 106 (H) 06/03/2022 10:48 AM    CALCIUM 9.9 01/28/2025 09:58 AM    CALCIUM 9.1 06/03/2022 10:48 AM    AST 14 01/28/2025 09:58 AM    AST 17 06/03/2022 10:48 AM    ALT 14 01/28/2025 09:58 AM    ALT 30 06/03/2022 10:48 AM    ALB 4.5 01/28/2025 09:58 AM    ALB 4.2 11/26/2024 05:09 AM    ALB 3.8 06/03/2022 10:48 AM    TP 6.7 01/28/2025 09:58 AM    TP 6.5 06/03/2022 10:48 AM    EGFR 95 01/28/2025 09:58 AM    EGFR 95 11/27/2024 05:35 AM     EGFR 100 06/03/2022 10:48 AM    EGFR 94 04/30/2020 08:30 AM     Lab Results   Component Value Date/Time    HGB 14.0 02/18/2025 07:08 AM    WBC 31.20 (H) 02/18/2025 07:08 AM     (L) 02/18/2025 07:08 AM    INR 1.09 02/18/2025 07:08 AM    PTT 30 11/25/2024 01:01 PM     Lab Results   Component Value Date/Time    [id number] 4.277 11/25/2024 01:01 PM       Recent Imaging:  Procedure: MRI abdomen w wo contrast  Result Date: 2/21/2025  Impression: Decreased size of chronic hepatic hemangioma compared to studies dating back to 2011 status post IR sclerosis and embolization. Other stable and nonemergent findings above. Workstation performed: [id number]     Procedure: CT chest abdomen pelvis w contrast  Result Date: 2/6/2025  Impression: 1. Dominant lesion in the left hepatic lobe with progressive enhancement on delayed images measures 7 x 7.3 cm highly suspicious for cholangiocarcinoma. At least 2 additional lesions in the caudate lobe and segment 7 consistent with metastatic disease. Tissue sampling recommended for definitive diagnosis. 2. No enlarged dee hepatis or upper abdominal lymph nodes. No peritoneal lesions. No evidence of pulmonary metastatic disease. 3. Colonic and jejunal diverticulosis. 4. Bilateral low-attenuation adrenal nodules consistent with adenoma. Electronically signed: 02/06/2025 07:40 PM Oswald Duarte MD      Administrative Statements   I have spent a total time of 30 minutes in caring for this patient on the day of the visit/encounter including Risks and benefits of tx options, Instructions for management, Patient and family education, Importance of tx compliance, Risk factor reductions, Impressions, Counseling / Coordination of care, Documenting in the medical record, Reviewing/placing orders in the medical record (including tests, medications, and/or procedures), and Obtaining or reviewing history  .   Topics discussed with the patient / family include symptom assessment and management,  medication review, psychosocial support, medical marijuana, supportive listening, and anticipatory guidance.

## 2025-06-25 LAB
ALBUMIN SERPL-MCNC: 4.4 G/DL (ref 3.5–5.7)
ALBUMIN/CREAT UR: NORMAL
ALP SERPL-CCNC: 54 U/L (ref 35–120)
ALT SERPL-CCNC: 15 U/L
ANION GAP SERPL CALCULATED.3IONS-SCNC: 7 MMOL/L (ref 3–11)
AST SERPL-CCNC: 14 U/L
BASOPHILS # BLD AUTO: 0 THOU/CMM (ref 0–0.1)
BASOPHILS NFR BLD AUTO: 0 %
BILIRUB SERPL-MCNC: 1 MG/DL (ref 0.2–1)
BUN SERPL-MCNC: 24 MG/DL (ref 7–28)
CALCIUM SERPL-MCNC: 9.6 MG/DL (ref 8.5–10.5)
CHLORIDE SERPL-SCNC: 104 MMOL/L (ref 100–109)
CHOLEST SERPL-MCNC: 186 MG/DL
CHOLEST/HDLC SERPL: 6.6 {RATIO}
CO2 SERPL-SCNC: 32 MMOL/L (ref 21–31)
CREAT SERPL-MCNC: 1.05 MG/DL (ref 0.53–1.3)
CREAT UR-MCNC: 119.5 MG/DL (ref 50–200)
CYTOLOGY CMNT CVX/VAG CYTO-IMP: ABNORMAL
DIFFERENTIAL METHOD BLD: ABNORMAL
EOSINOPHIL # BLD AUTO: 0.3 THOU/CMM (ref 0–0.5)
EOSINOPHIL NFR BLD AUTO: 1 %
ERYTHROCYTE [DISTWIDTH] IN BLOOD BY AUTOMATED COUNT: 15 % (ref 12–16)
EST. AVERAGE GLUCOSE BLD GHB EST-MCNC: 128 MG/DL
GFR/BSA.PRED SERPLBLD CYS-BASED-ARV: 79 ML/MIN/{1.73_M2}
GLUCOSE SERPL-MCNC: 136 MG/DL (ref 65–99)
HBA1C MFR BLD: 6.1 %
HCT VFR BLD AUTO: 45.9 % (ref 37–48)
HDLC SERPL-MCNC: 28 MG/DL (ref 23–92)
HGB BLD-MCNC: 14.5 G/DL (ref 12.5–17)
LDLC SERPL CALC-MCNC: 121 MG/DL
LYMPHOCYTES # BLD AUTO: 27.7 THOU/CMM (ref 1–3)
LYMPHOCYTES NFR BLD AUTO: 82 %
MCH RBC QN AUTO: 29.7 PG (ref 27–36)
MCHC RBC AUTO-ENTMCNC: 31.7 G/DL (ref 32–37)
MCV RBC AUTO: 94 FL (ref 80–100)
MICROALBUMIN UR-MCNC: <0.7 MG/DL
MONOCYTES # BLD AUTO: 0.3 THOU/CMM (ref 0.3–1)
MONOCYTES NFR BLD AUTO: 1 %
MORPHOLOGY BLD-IMP: ABNORMAL
NEUTROPHILS # BLD AUTO: 5.4 THOU/CMM (ref 1.8–7.8)
NEUTROPHILS NFR BLD AUTO: 16 %
NONHDLC SERPL-MCNC: 158 MG/DL
OVALOCYTES BLD QL SMEAR: ABNORMAL
PLATELET # BLD AUTO: 152 THOU/CMM (ref 140–350)
PMV BLD REES-ECKER: 10.1 FL (ref 7.5–11.3)
POTASSIUM SERPL-SCNC: 4.8 MMOL/L (ref 3.5–5.2)
PROT SERPL-MCNC: 6.8 G/DL (ref 6.3–8.3)
RBC # BLD AUTO: 4.89 MILL/CMM (ref 4–5.4)
SMUDGE CELLS BLD QL SMEAR: SLIGHT
SODIUM SERPL-SCNC: 143 MMOL/L (ref 135–145)
TRIGL SERPL-MCNC: 183 MG/DL
TSH SERPL-ACNC: 4.38 UIU/ML (ref 0.45–5.33)
WBC # BLD AUTO: 33.7 THOU/CMM (ref 4–10.5)

## 2025-06-30 ENCOUNTER — RA CDI HCC (OUTPATIENT)
Dept: OTHER | Facility: HOSPITAL | Age: 65
End: 2025-06-30

## 2025-07-07 DIAGNOSIS — E11.9 TYPE 2 DIABETES MELLITUS WITHOUT COMPLICATION, WITHOUT LONG-TERM CURRENT USE OF INSULIN (HCC): ICD-10-CM

## 2025-07-08 ENCOUNTER — TELEPHONE (OUTPATIENT)
Age: 65
End: 2025-07-08

## 2025-07-08 ENCOUNTER — OFFICE VISIT (OUTPATIENT)
Dept: FAMILY MEDICINE CLINIC | Facility: CLINIC | Age: 65
End: 2025-07-08
Payer: MEDICARE

## 2025-07-08 VITALS
BODY MASS INDEX: 44.02 KG/M2 | DIASTOLIC BLOOD PRESSURE: 86 MMHG | OXYGEN SATURATION: 97 % | SYSTOLIC BLOOD PRESSURE: 130 MMHG | WEIGHT: 315 LBS | HEART RATE: 88 BPM | TEMPERATURE: 96 F

## 2025-07-08 DIAGNOSIS — F17.200 CURRENT EVERY DAY SMOKER: ICD-10-CM

## 2025-07-08 DIAGNOSIS — F33.1 MODERATE EPISODE OF RECURRENT MAJOR DEPRESSIVE DISORDER (HCC): ICD-10-CM

## 2025-07-08 DIAGNOSIS — Z00.00 MEDICARE ANNUAL WELLNESS VISIT, SUBSEQUENT: ICD-10-CM

## 2025-07-08 DIAGNOSIS — Z23 NEED FOR COVID-19 VACCINE: ICD-10-CM

## 2025-07-08 DIAGNOSIS — E11.9 TYPE 2 DIABETES MELLITUS WITHOUT COMPLICATION, WITHOUT LONG-TERM CURRENT USE OF INSULIN (HCC): Primary | ICD-10-CM

## 2025-07-08 DIAGNOSIS — I48.0 PAROXYSMAL ATRIAL FIBRILLATION (HCC): ICD-10-CM

## 2025-07-08 DIAGNOSIS — I10 ESSENTIAL HYPERTENSION: ICD-10-CM

## 2025-07-08 DIAGNOSIS — Z23 ENCOUNTER FOR IMMUNIZATION: ICD-10-CM

## 2025-07-08 DIAGNOSIS — Z51.5 PALLIATIVE CARE BY SPECIALIST: ICD-10-CM

## 2025-07-08 DIAGNOSIS — C91.10 CLL (CHRONIC LYMPHOCYTIC LEUKEMIA) (HCC): ICD-10-CM

## 2025-07-08 DIAGNOSIS — Z12.5 PROSTATE CANCER SCREENING: ICD-10-CM

## 2025-07-08 DIAGNOSIS — Z12.11 COLON CANCER SCREENING: ICD-10-CM

## 2025-07-08 DIAGNOSIS — E66.01 MORBID OBESITY WITH BMI OF 40.0-44.9, ADULT (HCC): ICD-10-CM

## 2025-07-08 DIAGNOSIS — F41.1 GENERALIZED ANXIETY DISORDER: ICD-10-CM

## 2025-07-08 DIAGNOSIS — R16.0 LIVER MASS, LEFT LOBE: ICD-10-CM

## 2025-07-08 PROBLEM — Z79.899 MEDICAL MARIJUANA USE: Status: RESOLVED | Noted: 2025-04-09 | Resolved: 2025-07-08

## 2025-07-08 PROBLEM — R22.0 CHEEK MASS: Status: RESOLVED | Noted: 2023-02-27 | Resolved: 2025-07-08

## 2025-07-08 LAB
LEFT EYE DIABETIC RETINOPATHY: NORMAL
LEFT EYE IMAGE QUALITY: NORMAL
LEFT EYE MACULAR EDEMA: NORMAL
LEFT EYE OTHER RETINOPATHY: NORMAL
RIGHT EYE DIABETIC RETINOPATHY: NORMAL
RIGHT EYE IMAGE QUALITY: NORMAL
RIGHT EYE MACULAR EDEMA: NORMAL
RIGHT EYE OTHER RETINOPATHY: NORMAL
SEVERITY (EYE EXAM): NORMAL

## 2025-07-08 PROCEDURE — G0439 PPPS, SUBSEQ VISIT: HCPCS | Performed by: FAMILY MEDICINE

## 2025-07-08 PROCEDURE — 99214 OFFICE O/P EST MOD 30 MIN: CPT | Performed by: FAMILY MEDICINE

## 2025-07-08 PROCEDURE — G0009 ADMIN PNEUMOCOCCAL VACCINE: HCPCS

## 2025-07-08 PROCEDURE — 90480 ADMN SARSCOV2 VAC 1/ONLY CMP: CPT

## 2025-07-08 PROCEDURE — 91320 SARSCV2 VAC 30MCG TRS-SUC IM: CPT

## 2025-07-08 PROCEDURE — G2211 COMPLEX E/M VISIT ADD ON: HCPCS | Performed by: FAMILY MEDICINE

## 2025-07-08 PROCEDURE — 90677 PCV20 VACCINE IM: CPT

## 2025-07-08 RX ORDER — TIRZEPATIDE 2.5 MG/.5ML
2.5 INJECTION, SOLUTION SUBCUTANEOUS WEEKLY
Qty: 2 ML | Refills: 0 | Status: SHIPPED | OUTPATIENT
Start: 2025-07-08

## 2025-07-08 NOTE — ASSESSMENT & PLAN NOTE
Patient did not like MMJ and is going to see seeing palliative care for his anxiety.  Continue routine follow-up.

## 2025-07-08 NOTE — ASSESSMENT & PLAN NOTE
{If prescribing weight loss medication, click here to fill out prior auth smartform and then hit F2 with this smartlist to insert prior auth documentation (Optional):[id number]}    Orders:    Mounjaro 2.5 MG/0.5ML SOAJ; Inject 2.5 mg under the skin once a week

## 2025-07-08 NOTE — ASSESSMENT & PLAN NOTE
Anxiety does seem to be pretty stable at this time.  He takes a rare lorazepam.  Orders:    TSH, 3rd generation with Free T4 reflex; Future

## 2025-07-08 NOTE — ASSESSMENT & PLAN NOTE
He has not been able to obtain Mounjaro yet.  Did consult clinical pharmacy.  He would benefit greatly from the use of GLP-1/GIP agonist in light of his morbid obesity and multiple other health issues including his diabetes.  Lab Results   Component Value Date    HGBA1C 6.1 (H) 06/25/2025       Orders:    Ambulatory referral to Clinical Pharmacy - only for departments with embedded clinical pharmacists; Future    Hemoglobin A1C; Future    Mounjaro 2.5 MG/0.5ML SOAJ; Inject 2.5 mg under the skin once a week    IRIS Diabetic eye exam

## 2025-07-08 NOTE — ASSESSMENT & PLAN NOTE
Fortunately, this appears to be hepatic and angioma on recent MRI 2/25 when compared to studies back to 2011.  This is status post IR sclerosis and embolization.

## 2025-07-08 NOTE — ASSESSMENT & PLAN NOTE
Fortune, seems to be stable.  Continue follow-up with oncology.  Labs have been ordered by Dr. Cherry.

## 2025-07-08 NOTE — PROGRESS NOTES
Name: Brando Martinez      : 1960      MRN: [id number]  Encounter Provider: Allen Powell Jr, MD  Encounter Date: 2025   Encounter department: [facility] PRIMARY CARE  :  Assessment & Plan  Medicare annual wellness visit, subsequent  Check colonoscopy.  Proceed with PSA.       CLL (chronic lymphocytic leukemia) (HCC)  Fortune, seems to be stable.  Continue follow-up with oncology.  Labs have been ordered by Dr. Cherry.       Essential hypertension  Well-controlled at this time.  Orders:  •  Comprehensive metabolic panel; Future  •  CBC and Platelet; Future    Paroxysmal atrial fibrillation (HCC)  Appears to be in sinus rhythm today.  Continue Eliquis.  Continue metoprolol.       Type 2 diabetes mellitus without complication, without long-term current use of insulin (HCC)  He has not been able to obtain Mounjaro yet.  Did consult clinical pharmacy.  He would benefit greatly from the use of GLP-1/GIP agonist in light of his morbid obesity and multiple other health issues including his diabetes.  Lab Results   Component Value Date    HGBA1C 6.1 (H) 2025       Orders:  •  Ambulatory referral to Clinical Pharmacy - only for departments with embedded clinical pharmacists; Future  •  Hemoglobin A1C; Future  •  Mounjaro 2.5 MG/0.5ML SOAJ; Inject 2.5 mg under the skin once a week  •  IRIS Diabetic eye exam    Generalized anxiety disorder  Anxiety does seem to be pretty stable at this time.  He takes a rare lorazepam.  Orders:  •  TSH, 3rd generation with Free T4 reflex; Future    Current every day smoker  He is certainly encouraged to quit smoking.  He did have a chest CT done in February.  Consider lung cancer screening CT in 2026.       Liver mass, left lobe  Fortunately, this appears to be hepatic and angioma on recent MRI  when compared to studies back to .  This is status post IR sclerosis and embolization.       Morbid obesity with BMI of 40.0-44.9, adult  (HCC)      Orders:  •  Mounjaro 2.5 MG/0.5ML SOAJ; Inject 2.5 mg under the skin once a week    Moderate episode of recurrent major depressive disorder (HCC)  Mood seems to be relatively stable at this time.         Palliative care by specialist  Patient did not like MMJ and is going to see seeing palliative care for his anxiety.  Continue routine follow-up.         Need for COVID-19 vaccine    Orders:  •  COVID-19 Pfizer mRNA vaccine 12 yr and older (Comirnaty pre-filled syringe)    Encounter for immunization    Orders:  •  Pneumococcal Conjugate Vaccine 20-valent (Pcv20)    Prostate cancer screening  Check PSA with upcoming labs.  Orders:  •  PSA, Total Screen; Future    Colon cancer screening    Orders:  •  Ambulatory referral to Gastroenterology; Future      Depression Screening and Follow-up Plan: Patient's depression screening was positive with a PHQ-9 score of 14.   Patient with underlying depression and was advised to continue current medications as prescribed. He declines medication    Preventive health issues were discussed with patient, and age appropriate screening tests were ordered as noted in patient's After Visit Summary. Personalized health advice and appropriate referrals for health education or preventive services given if needed, as noted in patient's After Visit Summary.    History of Present Illness     HPI patient presents today for Medicare wellness as well as a follow-up of chronic health issues.  He is following closely with oncology regarding a new diagnosis of CLL.  He said no secondary symptoms such as unintentional weight loss or night sweats.  He continues to struggle with morbid obesity.  He is not really exercising at this time and has not had much in the way of dietary restriction.  Patient Care Team:  Allen Engle Jr., MD as PCP - General  Allen Engle Jr., MD as PCP - PCP-[facility] (RTE)  Oleg Schulte MD (Ophthalmology)  Dara Edward PA-C (Hematology and  Oncology)  ANDRAE Acosta as  Care Manager (Oncology)  Dania Cherry MD as Medical Oncologist (Hematology and Oncology)  Nichole Meier MD (Palliative Care)  Andrzej Zambrano MD (Palliative Care)    Review of Systems   Constitutional:  Negative for appetite change, chills, fatigue, fever and unexpected weight change.   HENT:  Negative for trouble swallowing.    Eyes:  Negative for visual disturbance.   Respiratory:  Positive for shortness of breath. Negative for cough, chest tightness and wheezing.    Cardiovascular:  Negative for chest pain, palpitations and leg swelling.   Gastrointestinal:  Negative for abdominal distention, abdominal pain, blood in stool, constipation and diarrhea.   Endocrine: Negative for polyuria.   Genitourinary:  Negative for difficulty urinating and flank pain.   Musculoskeletal:  Negative for arthralgias and myalgias.   Skin:  Negative for rash.   Neurological:  Negative for dizziness and light-headedness.   Hematological:  Negative for adenopathy. Does not bruise/bleed easily.   Psychiatric/Behavioral:  Negative for dysphoric mood and sleep disturbance. The patient is nervous/anxious.      Medical History Reviewed by provider this encounter:       Annual Wellness Visit Questionnaire   Brando is here for his Subsequent Wellness visit.     Health Risk Assessment:   Patient rates overall health as poor. Patient feels that their physical health rating is slightly worse. Patient is satisfied with their life. Eyesight was rated as same. Hearing was rated as same. Patient feels that their emotional and mental health rating is same. Patients states they are sometimes angry. Patient states they are always unusually tired/fatigued. Pain experienced in the last 7 days has been some. Patient's pain rating has been 2/10. Patient states that he has experienced no weight loss or gain in last 6 months. A fib 3 months diagnose with leukemia    Depression Screening:   PHQ-9 Score:  14      Fall Risk Screening:   In the past year, patient has experienced: no history of falling in past year      Home Safety:  Patient does not have trouble with stairs inside or outside of their home. Patient has working smoke alarms and has working carbon monoxide detector. Home safety hazards include: none.     Nutrition:   Current diet is Diabetic and Regular.     Medications:   Patient is not currently taking any over-the-counter supplements. Patient is able to manage medications.     Activities of Daily Living (ADLs)/Instrumental Activities of Daily Living (IADLs):   Walk and transfer into and out of bed and chair?: Yes  Dress and groom yourself?: Yes    Bathe or shower yourself?: Yes    Feed yourself? Yes  Do your laundry/housekeeping?: Yes  Manage your money, pay your bills and track your expenses?: Yes  Make your own meals?: Yes    Do your own shopping?: Yes    Previous Hospitalizations:   Any hospitalizations or ED visits within the last 12 months?: Yes    How many hospitalizations have you had in the last year?: 1-2    Hospitalization Comments: A fib     Advance Care Planning:   Living will: No    End of Life Decisions reviewed with patient: Yes      Cognitive Screening:   Provider or family/friend/caregiver concerned regarding cognition?: No    Preventive Screenings      Cardiovascular Screening:    General: Screening Current      Diabetes Screening:     General: Screening Not Indicated and History Diabetes      Colorectal Cancer Screening:     General: Risks and Benefits Discussed and Patient Declines      Prostate Cancer Screening:    General: Risks and Benefits Discussed    Due for: PSA      Abdominal Aortic Aneurysm (AAA) Screening:    Risk factors include: age between 65-76 yo and tobacco use        General: Screening Not Indicated      Lung Cancer Screening:     General: Screening Not Indicated      Hepatitis C Screening:    General: Screening Current    Immunizations:  - Immunizations due: Zoster  (Shingrix) and I suggest he get a Shingrix at the pharmacy.    Screening, Brief Intervention, and Referral to Treatment (SBIRT)     Screening  Typical number of drinks in a day: 0  Typical number of drinks in a week: 0  Interpretation: Low risk drinking behavior.    Single Item Drug Screening:  How often have you used an illegal drug (including marijuana) or a prescription medication for non-medical reasons in the past year? never    Single Item Drug Screen Score: 0  Interpretation: Negative screen for possible drug use disorder    Social Drivers of Health     Food Insecurity: No Food Insecurity (7/8/2025)    Nursing - Inadequate Food Risk Classification    • Worried About Running Out of Food in the Last Year: Never true    • Ran Out of Food in the Last Year: Never true    • Ran Out of Food in the Last Year: Never true   Transportation Needs: No Transportation Needs (7/8/2025)    PRAPARE - Transportation    • Lack of Transportation (Medical): No    • Lack of Transportation (Non-Medical): No   Housing Stability: Unknown (7/8/2025)    Housing Stability Vital Sign    • Unable to Pay for Housing in the Last Year: No    • Homeless in the Last Year: No   Utilities: Not At Risk (7/8/2025)    [facility] Utilities    • Threatened with loss of utilities: No     No results found.    Objective   There were no vitals taken for this visit.    Physical Exam  Constitutional:       General: He is not in acute distress.     Appearance: Normal appearance. He is well-developed. He is obese. He is not diaphoretic.   HENT:      Head: Normocephalic.      Right Ear: External ear normal.      Left Ear: External ear normal.      Nose: Nose normal.     Eyes:      General:         Right eye: No discharge.         Left eye: No discharge.      Conjunctiva/sclera: Conjunctivae normal.      Pupils: Pupils are equal, round, and reactive to light.     Neck:      Thyroid: No thyromegaly.      Trachea: No tracheal deviation.     Cardiovascular:      Rate and  Rhythm: Normal rate and regular rhythm.      Heart sounds: Normal heart sounds. No murmur heard.     No friction rub.   Pulmonary:      Effort: Pulmonary effort is normal. No respiratory distress.      Breath sounds: Normal breath sounds. No wheezing.   Chest:      Chest wall: No tenderness.   Abdominal:      General: There is no distension.      Palpations: There is no mass.      Tenderness: There is no abdominal tenderness. There is no guarding or rebound.      Hernia: No hernia is present.     Musculoskeletal:         General: No swelling or deformity.      Cervical back: Normal range of motion.      Right lower leg: No edema.      Left lower leg: No edema.   Lymphadenopathy:      Cervical: No cervical adenopathy.     Skin:     Findings: No erythema or rash.     Neurological:      General: No focal deficit present.      Mental Status: He is alert.      Cranial Nerves: No cranial nerve deficit.      Coordination: Coordination normal.     Psychiatric:         Thought Content: Thought content normal.

## 2025-07-08 NOTE — TELEPHONE ENCOUNTER
Reason for call:   [x] Prior Auth  [] Other:     Caller:  [x] Patient  [] Pharmacy  Name:   Address:   Callback Number:     Medication: Mounjaro    Dose/Frequency: 2.5 MG/0.5 ML    Quantity: 2mL    Ordering Provider:   [x] PCP/Provider -   [] Speciality/Provider -     Has the patient tried other medications and failed? If failed, which medications did they fail?    [] No   [] Yes -     Is the patient's insurance updated in EPIC?   [x] Yes   [] No     Is a copy of the patient's insurance scanned in EPIC?   [] Yes   [] No

## 2025-07-08 NOTE — ASSESSMENT & PLAN NOTE
He is certainly encouraged to quit smoking.  He did have a chest CT done in February.  Consider lung cancer screening CT in February 2026.

## 2025-07-08 NOTE — ASSESSMENT & PLAN NOTE
Well-controlled at this time.  Orders:    Comprehensive metabolic panel; Future    CBC and Platelet; Future

## 2025-07-09 ENCOUNTER — TELEPHONE (OUTPATIENT)
Dept: FAMILY MEDICINE CLINIC | Facility: CLINIC | Age: 65
End: 2025-07-09

## 2025-07-10 NOTE — TELEPHONE ENCOUNTER
PA for Mounjaro 2.5 MG/0.5ML SOAJ SUBMITTED to     via    []CMM-KEY:   [x]Surescripts-Case ID # [id number]   []Availity-Auth ID # NDC #   []Faxed to plan   []Other website   []Phone call Case ID #     [x]PA sent as URGENT    All office notes, labs and other pertaining documents and studies sent. Clinical questions answered. Awaiting determination from insurance company.     Turnaround time for your insurance to make a decision on your Prior Authorization can take 7-21 business days.

## 2025-07-10 NOTE — TELEPHONE ENCOUNTER
PA for Mounjaro 2.5 MG/0.5ML SOAJ APPROVED     Date(s) approved 6/10/25-6/10/26    Case #[id number]     Patient advised by          []MyChart Message  []Phone call   [x]LMOM  []L/M to call office as no active Communication consent on file  []Unable to leave detailed message as VM not approved on Communication consent       Pharmacy advised by    [x]Fax  []Phone call  []Secure Chat    Specialty Pharmacy    []     Approval letter scanned into Media No letter provided        no concerns

## 2025-07-16 ENCOUNTER — OFFICE VISIT (OUTPATIENT)
Dept: FAMILY MEDICINE CLINIC | Facility: CLINIC | Age: 65
End: 2025-07-16

## 2025-07-16 VITALS — BODY MASS INDEX: 44.05 KG/M2 | WEIGHT: 315 LBS

## 2025-07-16 DIAGNOSIS — E11.9 TYPE 2 DIABETES MELLITUS WITHOUT COMPLICATION, WITHOUT LONG-TERM CURRENT USE OF INSULIN (HCC): ICD-10-CM

## 2025-07-16 DIAGNOSIS — E66.01 MORBID OBESITY WITH BMI OF 40.0-44.9, ADULT (HCC): Primary | ICD-10-CM

## 2025-07-16 RX ORDER — TIRZEPATIDE 5 MG/.5ML
5 INJECTION, SOLUTION SUBCUTANEOUS WEEKLY
Qty: 2 ML | Refills: 0 | Status: SHIPPED | OUTPATIENT
Start: 2025-07-16 | End: 2025-07-28 | Stop reason: SDUPTHER

## 2025-07-16 NOTE — PROGRESS NOTES
[facility] Clinical Pharmacy Services  Zelda Cruz    Administrative Statements   Encounter provider Constanza Ball Pharmacist      Assessment/ Plan     Assessment & Plan  Type 2 diabetes mellitus without complication, without long-term current use of insulin ([facility])    Lab Results   Component Value Date    HGBA1C 6.1 (H) 06/25/2025     Most recent A1c below goal   No home readings to review.   Would benefit from Mounjaro; brought in for education.      Medications:  Mounjaro: patient was able to admin 2.5mg dose during appointment today. Patient to take 2.5mg/week for 1 month then increase to 5mg/week.   Metformin: stop  Home Monitoring: will hold off on home monitoring    Orders:    Ambulatory referral to Clinical Pharmacy - only for departments with embedded clinical pharmacists    Tirzepatide (Mounjaro) 5 MG/0.5ML SOAJ; Inject 5 mg under the skin once a week    Morbid obesity with BMI of 40.0-44.9, adult ([facility])  Prior Authorization Clinical Questions for Weight Management Pharmacotherapy       Baseline weight (in pounds): 324.8 lbs  Current weight (in pounds): 324.8 lbs  Weight loss percentage: 0%                Follow-up: 2 months   Subjective   HPI    Medication Adherence/ Tolerability/ Cost:  Patient denies side effects, no issues with cost, 0 missed doses in last two week  apixaban  furosemide  LORazepam  metoprolol tartrate  Ciarra Atwood    Qualifies for extra medication help through [facility],  has reduced Eliquis cost to approx $12/month    Brought in Mounjaro for education    2. Lifestyle:   Weight has fluctuated due to changes in lifestyle, patient aware lifestyle changes he need to make to lose weight but reports sweets are weakness      Objective     ASCVD Risk:  The 10-year ASCVD risk score (Mayco GONZALEZ, et al., 2019) is: 34.8%    Values used to calculate the score:      Age: 65 years      Clincally relevant sex: Male      Is Non- : No      Diabetic: Yes       Tobacco smoker: No      Systolic Blood Pressure: 130 mmHg      Is BP treated: Yes      HDL Cholesterol: 28 mg/dL      Total Cholesterol: 186 mg/dL     Vitals:  Vitals:    07/16/25 1007   Weight: (!) 147 kg (324 lb 12.8 oz)       Eye Exam:    Lab Results   Component Value Date    LEFTDIABRET None 07/08/2025    RIGHTDIABRET None 07/08/2025       Labs:  Lab Results   Component Value Date    SODIUM 143 06/25/2025    K 4.8 06/25/2025    EGFR 79 06/25/2025    CREATININE 1.05 06/25/2025    GLUF 208 (H) 01/20/2020    MICROALBCRE Unable to perform calculation. 06/03/2022       Lab Results   Component Value Date    HGBA1C 6.1 (H) 06/25/2025    HGBA1C 5.3 11/25/2024    HGBA1C 5.2 01/09/2024         Pharmacist Tracking Tool     Pharmacist Tracking Tool  Reason For Outreach: Embedded Pharmacist  Demographics:  Intervention Method: In Person  Type of Intervention: New  Topics Addressed: Diabetes  Pharmacologic Interventions: N/A  Non-Pharmacologic Interventions: Medication/Device education  Time:  Direct Patient Care: 25 mins  Care Coordination: 5 mins  Recommendation Recipient: Patient/Caregiver  Outcome: Accepted

## 2025-07-16 NOTE — ASSESSMENT & PLAN NOTE
Prior Authorization Clinical Questions for Weight Management Pharmacotherapy       Baseline weight (in pounds): 324.8 lbs  Current weight (in pounds): 324.8 lbs  Weight loss percentage: 0%

## 2025-07-16 NOTE — ASSESSMENT & PLAN NOTE
Lab Results   Component Value Date    HGBA1C 6.1 (H) 06/25/2025     Most recent A1c below goal   No home readings to review.   Would benefit from Mounjaro; brought in for education.      Medications:  Mounjaro: patient was able to admin 2.5mg dose during appointment today. Patient to take 2.5mg/week for 1 month then increase to 5mg/week.   Metformin: stop  Home Monitoring: will hold off on home monitoring    Orders:    Ambulatory referral to Clinical Pharmacy - only for departments with embedded clinical pharmacists    Tirzepatide (Mounjaro) 5 MG/0.5ML SOAJ; Inject 5 mg under the skin once a week

## 2025-07-16 NOTE — TELEPHONE ENCOUNTER
Patient called and would like to verify that the 5 mg Mounjaro was supposed to be for next month?  He should finish the current dose?  Pls call at

## 2025-07-17 ENCOUNTER — OFFICE VISIT (OUTPATIENT)
Dept: CARDIOLOGY CLINIC | Facility: CLINIC | Age: 65
End: 2025-07-17
Payer: MEDICARE

## 2025-07-17 VITALS
SYSTOLIC BLOOD PRESSURE: 138 MMHG | BODY MASS INDEX: 42.66 KG/M2 | OXYGEN SATURATION: 100 % | DIASTOLIC BLOOD PRESSURE: 82 MMHG | WEIGHT: 315 LBS | HEIGHT: 72 IN | HEART RATE: 74 BPM

## 2025-07-17 DIAGNOSIS — I48.0 PAROXYSMAL ATRIAL FIBRILLATION (HCC): ICD-10-CM

## 2025-07-17 DIAGNOSIS — I10 ESSENTIAL HYPERTENSION: Primary | ICD-10-CM

## 2025-07-17 PROCEDURE — 99214 OFFICE O/P EST MOD 30 MIN: CPT | Performed by: INTERNAL MEDICINE

## 2025-07-17 NOTE — PROGRESS NOTES
Cardiology             Brando Martinez  1960  [id number]              Assessment/Plan:    Chronic diastolic CHF  Paroxysmal atrial fibrillation, diagnosed 11/25/2024 status post WALTER/cardioversion and subsequent recurrence  Morbid obesity  Probable sleep apnea  Hypertension  Diabetes      Volume status compensated by examination, office weight 319 pounds, continue furosemide 20 mg daily.  I have encouraged him to take an additional 20 mg of furosemide if he has any significant weight gain of more than 3 pounds over a day or more than 5 pounds over a week  Heart rate very well-controlled, continue with Toprol tartrate 25 mg p.o. twice daily  Continue Eliquis anticoagulation which he is tolerating well without abnormal bleeding or bruising  Strongly recommended sleep study which he is currently refusing at this time.  He does state that he will think about it  Heartedly diet, exercise, weight loss strongly recommended for his obesity.  He is on Mounjaro which will be continued  Blood pressure acceptable today, continue metoprolol.  May consider adding amlodipine if need better blood pressure control in the future  Continue following up with PCP for treatment of diabetes.  Continue Mounjaro.  Uncertain why patient is not on a statin in the setting of diabetes.  Will defer to PCP, but would consider starting rosuvastatin considering dyslipidemia in the setting of diabetes.      Follow-up in 6 months        Personally reviewed hospital studies including images of echocardiogram, WALTER, twelve-lead ECG  Prior blood work reviewed from 6/25/2025 including lipid panel        Interval History:     This is a 65-year-old male who was hospitalized 11/2020 for with new onset atrial fibrillation and RVR along with diastolic CHF.  Diuresis was initiated.  He underwent successful WALTER/cardioversion on 11/27/2024.  WALTER revealed normal left ventricular systolic function with ejection fraction 55% and absence of PFO.    He  presents today for follow-up with no.  He admits to feeling depressed at times.  He denies chest pain, shortness of breath, lower extremity edema.                 Vitals:  Vitals:    07/17/25 1143   BP: 138/82   Pulse: 74   SpO2: 100%   Weight: (!) 145 kg (319 lb)   Height: 6' (1.829 m)         Past Medical History[1]  Social History     Socioeconomic History   • Marital status: Single     Spouse name: Not on file   • Number of children: Not on file   • Years of education: Not on file   • Highest education level: Not on file   Occupational History   • Not on file   Tobacco Use   • Smoking status: Former     Current packs/day: 0.50     Average packs/day: 0.5 packs/day for 21.5 years (10.8 ttl pk-yrs)     Types: Cigarettes     Start date: 2004   • Smokeless tobacco: Never   • Tobacco comments:     8-10 cigs per day  last smoked 2.17.25   Vaping Use   • Vaping status: Never Used   Substance and Sexual Activity   • Alcohol use: No   • Drug use: Never   • Sexual activity: Yes     Partners: Female   Other Topics Concern   • Not on file   Social History Narrative   • Not on file     Social Drivers of Health     Financial Resource Strain: Not on file   Food Insecurity: No Food Insecurity (7/10/2025)    Nursing - Inadequate Food Risk Classification    • Worried About Running Out of Food in the Last Year: Never true    • Ran Out of Food in the Last Year: Never true    • Ran Out of Food in the Last Year: Never true   Transportation Needs: No Transportation Needs (7/10/2025)    PRAPARE - Transportation    • Lack of Transportation (Medical): No    • Lack of Transportation (Non-Medical): No   Physical Activity: Not on file   Stress: Not on file   Social Connections: Unknown (8/26/2024)    Received from [employer]    Social [place]    • How often do you feel lonely or isolated from those around you? (Adult - for ages 18 years and over): Not on file   Intimate Partner Violence: Unknown (11/26/2024)    Nursing IPS    • Feels  Physically and Emotionally Safe: Not on file    • Physically Hurt by Someone: Not on file    • Humiliated or Emotionally Abused by Someone: Not on file    • Physically Hurt by Someone: No    • Hurt or Threatened by Someone: No   Housing Stability: Low Risk  (7/10/2025)    Housing Stability Vital Sign    • Unable to Pay for Housing in the Last Year: No    • Number of Times Moved in the Last Year: 1    • Homeless in the Last Year: No      Family History[1]  Past Surgical History[1]  Current Medications[1]        Review of Systems:  Review of Systems   Respiratory: Negative.     Cardiovascular: Negative.    All other systems reviewed and are negative.        Physical Exam:  Physical Exam  Constitutional:       General: He is not in acute distress.     Appearance: He is well-developed. He is obese. He is not diaphoretic.   HENT:      Head: Normocephalic and atraumatic.     Eyes:      General: No scleral icterus.        Right eye: No discharge.      Pupils: Pupils are equal, round, and reactive to light.     Neck:      Thyroid: No thyromegaly.     Cardiovascular:      Rate and Rhythm: Normal rate. Rhythm irregular.      Heart sounds: Normal heart sounds. No murmur heard.     No friction rub. No gallop.   Pulmonary:      Effort: Pulmonary effort is normal.      Breath sounds: Normal breath sounds.   Abdominal:      General: There is no distension.      Tenderness: There is no abdominal tenderness. There is no guarding or rebound.     Musculoskeletal:         General: Normal range of motion.      Cervical back: Normal range of motion and neck supple.     Skin:     General: Skin is warm and dry.      Coloration: Skin is not pale.      Findings: No erythema or rash.     Neurological:      Mental Status: He is alert and oriented to person, place, and time.      Coordination: Coordination normal.     Psychiatric:         Behavior: Behavior normal.         Thought Content: Thought content normal.         Judgment: Judgment  normal.         This note was completed in part utilizing [employer] Direct Software.  Grammatical errors, random word insertions, spelling mistakes, and incomplete sentences can be an occasional consequence of this system secondary to software limitations, ambient noise, and hardware issues.  If you have any questions or concerns about the content, text, or information contained within the body of this dictation, please contact the provider for clarification.             [1]  Past Medical History:  Diagnosis Date   • Anxiety     last asessed 06Nov2012   • Depression     last assessed 29Jan2013   • Hemangioma of liver     Of liver - emnolized 2012;  last assessed 19Aug2014   • History of basal cell cancer     basal cell skin ca on nose   • Hyperlipidemia    • Polyp of sigmoid colon 09/01/2012    x 1  9/12   [1]  Family History  Problem Relation Name Age of Onset   • Depression Mother     • Hodgkin's lymphoma Maternal Uncle     [1]  Past Surgical History:  Procedure Laterality Date   • CHOLECYSTECTOMY LAPAROSCOPIC     • EMBOLIZATION  02/01/2012    Large Hemangioma, 2/12   • VENTRAL HERNIA REPAIR      [facility]   [1]    Current Outpatient Medications:   •  apixaban (ELIQUIS) 5 mg, Take 1 tablet (5 mg total) by mouth 2 (two) times a day, Disp: 60 tablet, Rfl: 11  •  furosemide (LASIX) 20 mg tablet, Take 1 tablet (20 mg total) by mouth 2 (two) times a day (Patient taking differently: Take 20 mg by mouth daily), Disp: , Rfl:   •  metoprolol tartrate (LOPRESSOR) 25 mg tablet, Take 1 tablet (25 mg total) by mouth every 12 (twelve) hours, Disp: 60 tablet, Rfl: 11  •  Mounjaro 2.5 MG/0.5ML SOAJ, Inject 2.5 mg under the skin once a week, Disp: 2 mL, Rfl: 0  •  LORazepam (ATIVAN) 0.5 mg tablet, Take 1 tablet (0.5 mg total) by mouth every 8 (eight) hours as needed for anxiety (Patient not taking: Reported on 7/17/2025), Disp: 15 tablet, Rfl: 0  •  Tirzepatide (Mounjaro) 5 MG/0.5ML SOAJ, Inject 5 mg under the skin once a  week (Patient not taking: Reported on 7/17/2025), Disp: 2 mL, Rfl: 0

## 2025-07-24 DIAGNOSIS — E66.01 MORBID OBESITY WITH BMI OF 40.0-44.9, ADULT (HCC): ICD-10-CM

## 2025-07-24 DIAGNOSIS — E11.9 TYPE 2 DIABETES MELLITUS WITHOUT COMPLICATION, WITHOUT LONG-TERM CURRENT USE OF INSULIN (HCC): ICD-10-CM

## 2025-07-24 NOTE — PROGRESS NOTES
Name: Brando Martinez      : 1960      MRN: [id number]  Encounter Provider: Dania Cherry MD  Encounter Date: 2025   Encounter department: [facility]  :  Assessment & Plan  CLL (chronic lymphocytic leukemia) (HCC)  65 yoM with IRVERS stage 0 CLL without adenopathy or splenomegaly, normal FISH CLL profile, IGVH pending    Overall, the patient does not have any constitutional symptoms or indications for treatment.    IPS-E Score for predicting time to first treatment in early stage CLL: at least 1 (intermediate risk)  IGHV status: unknown  Absolute lymphocytes >31396: yes, ALC 85188 (2025)  Palpable lymphadenopathy: no    Estimated time to first treatment:  Low risk (no risk factors) - <1% at one year; 8% at five years  Intermediate risk (one risk factor) - 3% at one year; 28% at five years  High risk (two or three risk factors) - 14% at one year; 61% at five years    Summary of Recommendations:  Reassured patient about the indolent course of CLL and that it often does not require treatment in which phase he is currently in  IGVH was previously ordered but not drawn  Labs prior to next office visit in ~5 months    Orders:    CBC and differential; Future    Comprehensive metabolic panel; Future      Return in about 5 months (around 2025) for Office Visit, Labs - See Treatment Plan.    History of Present Illness   Chief Complaint   Patient presents with    Follow-up   This is a 64-year-old male with history of paroxysmal A-fib, CHF, DM2, depression, hyperlipidemia, hemangioma of the liver status embolization many years ago.  The patient apparently had elevated white cell count with mainly lymphocytosis which was investigated extensively just recently.  Flow cytometry showed B-cell population, CD5 positive, CD23 positive which seems to be compatible with chronic lymphocytic leukemia.    He did have a CT scan of the chest and pelvis on 2025 for staging  which showed abnormal finding in the left hepatic lobe suspicious for malignant process.  No adenopathy or splenomegaly was noted.  The patient was then evaluated with an MRI of the abdomen which showed rather chronic hepatic hemangioma status post IR sclerosis and embolization.  No suspicious process in the liver was noted on the MRI.    Interval History: 7/25/2025  Patient presents for 5-month follow-up last seen 2/28/2025.  Most recent labs (6/25/2025) show FISH CLL profile was normal, WBC 33.7 (82 lymph, 16 Neut, 1 mono, 1 EO), stable since WBC 35.7 (1/28/2025), Hb 14.5, , SCR 1.05, other aspects of CMP roughly WNL, , SPEP negative for monoclonal protein, FLC elevated at 2.0 kappa/lambda 31.3/15.5, immunoglobulins WNL (IgG 744, IgA 150).    Patient is very anxious about his diagnosis of CLL and other medical conditions.  He went on a fishing trip and stayed in the cabin the whole time given his anxiety.      Review of Systems   Constitutional:  Positive for fatigue. Negative for chills and fever.   HENT:  Negative for ear pain and sore throat.    Eyes:  Negative for pain and visual disturbance.   Respiratory:  Positive for shortness of breath. Negative for cough.    Cardiovascular:  Negative for chest pain and palpitations.   Gastrointestinal:  Negative for abdominal pain and vomiting.   Genitourinary:  Negative for dysuria and hematuria.   Musculoskeletal:  Negative for arthralgias and back pain.   Skin:  Negative for color change and rash.   Neurological:  Negative for seizures and syncope.   Psychiatric/Behavioral:  Positive for sleep disturbance.    All other systems reviewed and are negative.    Medical History Reviewed by provider this encounter:     .  Current Outpatient Medications on File Prior to Visit   Medication Sig Dispense Refill    apixaban (ELIQUIS) 5 mg Take 1 tablet (5 mg total) by mouth 2 (two) times a day 60 tablet 11    furosemide (LASIX) 20 mg tablet Take 1 tablet (20 mg  total) by mouth 2 (two) times a day      metoprolol tartrate (LOPRESSOR) 25 mg tablet Take 1 tablet (25 mg total) by mouth every 12 (twelve) hours 60 tablet 11    Mounjaro 2.5 MG/0.5ML SOAJ Inject 2.5 mg under the skin once a week 2 mL 0    LORazepam (ATIVAN) 0.5 mg tablet Take 1 tablet (0.5 mg total) by mouth every 8 (eight) hours as needed for anxiety (Patient not taking: Reported on 7/17/2025) 15 tablet 0    Tirzepatide (Mounjaro) 5 MG/0.5ML SOAJ Inject 5 mg under the skin once a week (Patient not taking: Reported on 7/17/2025) 2 mL 0     No current facility-administered medications on file prior to visit.      Social History     Tobacco Use    Smoking status: Former     Current packs/day: 0.50     Average packs/day: 0.5 packs/day for 21.6 years (10.8 ttl pk-yrs)     Types: Cigarettes     Start date: 2004    Smokeless tobacco: Never    Tobacco comments:     8-10 cigs per day  last smoked 2.17.25   Vaping Use    Vaping status: Never Used   Substance and Sexual Activity    Alcohol use: No    Drug use: Never    Sexual activity: Yes     Partners: Female       Objective   /82 (BP Location: Left arm, Patient Position: Sitting, Cuff Size: Adult)   Pulse 67   Temp 97.6 °F (36.4 °C)   Resp 18   Ht 6' (1.829 m)   Wt (!) 146 kg (322 lb)   SpO2 99%   BMI 43.67 kg/m²     Pain Screening:  Pain Score:   2    ECOG ECOG Performance Status: 1 - Restricted in physically strenuous activity but ambulatory and able to carry out work of a light or sedentary nature, e.g., light house work, office work     Physical Exam  Constitutional:       Appearance: He is well-developed.   HENT:      Head: Normocephalic and atraumatic.      Nose: Nose normal.     Eyes:      General: No scleral icterus.        Right eye: No discharge.         Left eye: No discharge.      Conjunctiva/sclera: Conjunctivae normal.      Pupils: Pupils are equal, round, and reactive to light.     Neck:      Thyroid: No thyromegaly.      Trachea: No tracheal  deviation.     Cardiovascular:      Rate and Rhythm: Normal rate and regular rhythm.      Heart sounds: Normal heart sounds. No murmur heard.     No friction rub.   Pulmonary:      Effort: Pulmonary effort is normal. No respiratory distress.      Breath sounds: Normal breath sounds. No wheezing or rales.   Chest:      Chest wall: No tenderness.   Abdominal:      General: There is no distension.      Palpations: Abdomen is soft. There is no hepatomegaly or splenomegaly.      Tenderness: There is no abdominal tenderness. There is no guarding or rebound.     Musculoskeletal:         General: No tenderness or deformity. Normal range of motion.      Cervical back: Normal range of motion and neck supple.   Lymphadenopathy:      Cervical: No cervical adenopathy.     Skin:     General: Skin is warm and dry.      Coloration: Skin is not pale.      Findings: No erythema or rash.     Neurological:      Mental Status: He is alert and oriented to person, place, and time.      Cranial Nerves: No cranial nerve deficit.      Coordination: Coordination normal.      Deep Tendon Reflexes: Reflexes are normal and symmetric.     Psychiatric:         Behavior: Behavior normal.         Thought Content: Thought content normal.         Judgment: Judgment normal.       Labs: I have reviewed the following labs:  Lab Results   Component Value Date/Time    WBC 31.20 (H) 02/18/2025 07:08 AM    White Blood Cell Count 33.7 (H) 06/25/2025 06:44 AM    RBC 4.77 02/18/2025 07:08 AM    Red Blood Cell Count 4.89 06/25/2025 06:44 AM    Hemoglobin 14.5 06/25/2025 06:44 AM    Hemoglobin 14.0 02/18/2025 07:08 AM    Hematocrit 43.8 02/18/2025 07:08 AM    HCT 45.9 06/25/2025 06:44 AM    MCV 94 06/25/2025 06:44 AM    MCV 92 02/18/2025 07:08 AM    MCH 29.7 06/25/2025 06:44 AM    MCH 29.4 02/18/2025 07:08 AM    RDW 15.0 06/25/2025 06:44 AM    RDW 15.3 (H) 02/18/2025 07:08 AM    Platelet Count 152 06/25/2025 06:44 AM    Platelets 137 (L) 02/18/2025 07:08 AM     Segmented % 13 (L) 02/18/2025 07:08 AM    Neutrophils 16 06/25/2025 06:44 AM    Lymphocytes % 84 (H) 02/18/2025 07:08 AM    Lymphocytes 82 06/25/2025 06:44 AM    Monocytes % 2 (L) 02/18/2025 07:08 AM    Monocytes 1 06/25/2025 06:44 AM    Eosinophils Relative 1 02/18/2025 07:08 AM    Eosinophils 1 06/25/2025 06:44 AM    Basophils Relative 0 02/18/2025 07:08 AM    Immature Grans % 0 02/18/2025 07:08 AM    Absolute Neutrophils 3.88 02/18/2025 07:08 AM    Neutrophils (Absolute) 5.4 06/25/2025 06:44 AM     Lab Results   Component Value Date/Time    Potassium 4.8 06/25/2025 06:44 AM    Potassium 4.8 06/25/2025 06:44 AM    Chloride 104 06/25/2025 06:44 AM    Chloride 104 06/25/2025 06:44 AM    Carbon Dioxide 32 (H) 06/25/2025 06:44 AM    CO2 32 (H) 06/25/2025 06:44 AM    BUN 24 06/25/2025 06:44 AM    BUN 24 06/25/2025 06:44 AM    Creatinine 1.05 06/25/2025 06:44 AM    Creatinine 1.05 06/25/2025 06:44 AM    Calcium 9.6 06/25/2025 06:44 AM    Calcium 9.6 06/25/2025 06:44 AM    AST 14 06/25/2025 06:44 AM    AST 14 06/25/2025 06:44 AM    ALT 15 06/25/2025 06:44 AM    ALT 15 06/25/2025 06:44 AM    Alkaline Phosphatase 54 06/25/2025 06:44 AM    Alkaline Phosphatase 54 06/25/2025 06:44 AM    Protein, Total 6.8 06/25/2025 06:44 AM    Protein, Total 6.8 06/25/2025 06:44 AM    Albumin 4.4 06/25/2025 06:44 AM    ALBUMIN 4.4 06/25/2025 06:44 AM    Total Bilirubin 1 06/25/2025 06:44 AM    TOTAL BILIRUBIN 1.0 06/25/2025 06:44 AM    eGFRcr 79 06/25/2025 06:44 AM    eGFR 79 06/25/2025 06:44 AM     Lab Results   Component Value Date/Time    WBC 31.20 (H) 02/18/2025 07:08 AM    White Blood Cell Count 33.7 (H) 06/25/2025 06:44 AM    RBC 4.77 02/18/2025 07:08 AM    Red Blood Cell Count 4.89 06/25/2025 06:44 AM    Hemoglobin 14.5 06/25/2025 06:44 AM    Hemoglobin 14.0 02/18/2025 07:08 AM    Hematocrit 43.8 02/18/2025 07:08 AM    HCT 45.9 06/25/2025 06:44 AM    MCV 94 06/25/2025 06:44 AM    MCV 92 02/18/2025 07:08 AM    MCH 29.7 06/25/2025  06:44 AM    MCH 29.4 02/18/2025 07:08 AM    RDW 15.0 06/25/2025 06:44 AM    RDW 15.3 (H) 02/18/2025 07:08 AM    Platelet Count 152 06/25/2025 06:44 AM    Platelets 137 (L) 02/18/2025 07:08 AM    Segmented % 13 (L) 02/18/2025 07:08 AM    Neutrophils 16 06/25/2025 06:44 AM    Lymphocytes % 84 (H) 02/18/2025 07:08 AM    Lymphocytes 82 06/25/2025 06:44 AM    Monocytes % 2 (L) 02/18/2025 07:08 AM    Monocytes 1 06/25/2025 06:44 AM    Eosinophils Relative 1 02/18/2025 07:08 AM    Eosinophils 1 06/25/2025 06:44 AM    Basophils Relative 0 02/18/2025 07:08 AM    Immature Grans % 0 02/18/2025 07:08 AM    Absolute Neutrophils 3.88 02/18/2025 07:08 AM    Neutrophils (Absolute) 5.4 06/25/2025 06:44 AM      Lab Results   Component Value Date/Time    Sodium 143 06/25/2025 06:44 AM    Sodium 143 06/25/2025 06:44 AM    Potassium 4.8 06/25/2025 06:44 AM    Potassium 4.8 06/25/2025 06:44 AM    Chloride 104 06/25/2025 06:44 AM    Chloride 104 06/25/2025 06:44 AM    Carbon Dioxide 32 (H) 06/25/2025 06:44 AM    CO2 32 (H) 06/25/2025 06:44 AM    ANION GAP 7 06/25/2025 06:44 AM    ANION GAP 7 06/25/2025 06:44 AM    BUN 24 06/25/2025 06:44 AM    BUN 24 06/25/2025 06:44 AM    Creatinine 1.05 06/25/2025 06:44 AM    Creatinine 1.05 06/25/2025 06:44 AM    Glucose 136 (H) 06/25/2025 06:44 AM    Glucose, Random 136 (H) 06/25/2025 06:44 AM    Calcium 9.6 06/25/2025 06:44 AM    Calcium 9.6 06/25/2025 06:44 AM    AST 14 06/25/2025 06:44 AM    AST 14 06/25/2025 06:44 AM    ALT 15 06/25/2025 06:44 AM    ALT 15 06/25/2025 06:44 AM    Alkaline Phosphatase 54 06/25/2025 06:44 AM    Alkaline Phosphatase 54 06/25/2025 06:44 AM    Protein, Total 6.8 06/25/2025 06:44 AM    Protein, Total 6.8 06/25/2025 06:44 AM    Albumin 4.4 06/25/2025 06:44 AM    ALBUMIN 4.4 06/25/2025 06:44 AM    Total Bilirubin 1 06/25/2025 06:44 AM    TOTAL BILIRUBIN 1.0 06/25/2025 06:44 AM    eGFRcr 79 06/25/2025 06:44 AM    eGFR 79 06/25/2025 06:44 AM      Lab Results   Component  "Value Date/Time     (L) 06/25/2025 06:48 AM      Lab Results   Component Value Date/Time    TSH 3RD GENERATION 4.277 11/25/2024 01:01 PM     (L) 06/25/2025 06:48 AM      No results found for: \"IRON\", \"CONCFE\", \"FERRITIN\", \"[id number]\", \"FOLATE\", \"COPPER\", \"EPOREFLAB\", \"ERYTHROPRO\", \"ESR\", \"CRP\", \"HIVAGAB\", \"HEPATITIS\"     FISH CLL Panel (6/25/2025)      I have spent a total time of 30 minutes in caring for this patient on the day of the visit/encounter including reviewing diagnostic results, prognosis, risks and benefits of tx options; instructions for management; patient and family education; risk factor reductions; impressions; counseling / coordination of care; documenting in the medical record; reviewing/placing orders in the medical record; obtaining or reviewing history; and communicating with other healthcare professionals.    This note has been generated by voice recognition software system. Therefore, there may be spelling, grammar, and or syntax errors. Please contact if questions arise.     Additional recommendations to follow per attending, Dr. Cherry.    Javier Bills DO, PGY5  Hematology/Oncology Fellow  "

## 2025-07-24 NOTE — ASSESSMENT & PLAN NOTE
65 yoM with RIVERS stage 0 CLL without adenopathy or splenomegaly, normal FISH CLL profile, IGVH pending    Overall, the patient does not have any constitutional symptoms or indications for treatment.    IPS-E Score for predicting time to first treatment in early stage CLL: at least 1 (intermediate risk)  IGHV status: unknown  Absolute lymphocytes >44003: yes, ALC 15356 (6/25/2025)  Palpable lymphadenopathy: no    Estimated time to first treatment:  Low risk (no risk factors) - <1% at one year; 8% at five years  Intermediate risk (one risk factor) - 3% at one year; 28% at five years  High risk (two or three risk factors) - 14% at one year; 61% at five years    Summary of Recommendations:  Reassured patient about the indolent course of CLL and that it often does not require treatment in which phase he is currently in  IGVH was previously ordered but not drawn  Labs prior to next office visit in ~5 months    Orders:    CBC and differential; Future    Comprehensive metabolic panel; Future

## 2025-07-25 ENCOUNTER — OFFICE VISIT (OUTPATIENT)
Dept: HEMATOLOGY ONCOLOGY | Facility: CLINIC | Age: 65
End: 2025-07-25
Payer: MEDICARE

## 2025-07-25 VITALS
SYSTOLIC BLOOD PRESSURE: 132 MMHG | DIASTOLIC BLOOD PRESSURE: 82 MMHG | TEMPERATURE: 97.6 F | WEIGHT: 315 LBS | BODY MASS INDEX: 42.66 KG/M2 | HEART RATE: 67 BPM | OXYGEN SATURATION: 99 % | RESPIRATION RATE: 18 BRPM | HEIGHT: 72 IN

## 2025-07-25 DIAGNOSIS — C91.10 CLL (CHRONIC LYMPHOCYTIC LEUKEMIA) (HCC): Primary | ICD-10-CM

## 2025-07-25 PROCEDURE — G2211 COMPLEX E/M VISIT ADD ON: HCPCS | Performed by: INTERNAL MEDICINE

## 2025-07-25 PROCEDURE — 99214 OFFICE O/P EST MOD 30 MIN: CPT | Performed by: INTERNAL MEDICINE

## 2025-07-25 RX ORDER — TIRZEPATIDE 2.5 MG/.5ML
INJECTION, SOLUTION SUBCUTANEOUS
Qty: 2 ML | Refills: 1 | Status: SHIPPED | OUTPATIENT
Start: 2025-07-25

## 2025-07-25 NOTE — PROGRESS NOTES
Will have clinical pharmacy assess patient's Mounjaro dosing as I am getting different refill dosing requests.

## 2025-07-28 DIAGNOSIS — E11.9 TYPE 2 DIABETES MELLITUS WITHOUT COMPLICATION, WITHOUT LONG-TERM CURRENT USE OF INSULIN (HCC): ICD-10-CM

## 2025-07-29 RX ORDER — TIRZEPATIDE 5 MG/.5ML
5 INJECTION, SOLUTION SUBCUTANEOUS WEEKLY
Qty: 2 ML | Refills: 0 | Status: SHIPPED | OUTPATIENT
Start: 2025-07-29